# Patient Record
Sex: FEMALE | Race: WHITE | NOT HISPANIC OR LATINO | Employment: OTHER | URBAN - METROPOLITAN AREA
[De-identification: names, ages, dates, MRNs, and addresses within clinical notes are randomized per-mention and may not be internally consistent; named-entity substitution may affect disease eponyms.]

---

## 2018-03-19 ENCOUNTER — OFFICE VISIT CONVERTED (OUTPATIENT)
Dept: CARDIOLOGY | Facility: CLINIC | Age: 68
End: 2018-03-19
Attending: INTERNAL MEDICINE

## 2018-09-19 ENCOUNTER — OFFICE VISIT CONVERTED (OUTPATIENT)
Dept: CARDIOLOGY | Facility: CLINIC | Age: 68
End: 2018-09-19
Attending: INTERNAL MEDICINE

## 2018-10-18 ENCOUNTER — OFFICE VISIT CONVERTED (OUTPATIENT)
Dept: CARDIOLOGY | Facility: CLINIC | Age: 68
End: 2018-10-18
Attending: INTERNAL MEDICINE

## 2019-03-28 ENCOUNTER — CONVERSION ENCOUNTER (OUTPATIENT)
Dept: CARDIOLOGY | Facility: CLINIC | Age: 69
End: 2019-03-28

## 2019-03-28 ENCOUNTER — OFFICE VISIT CONVERTED (OUTPATIENT)
Dept: CARDIOLOGY | Facility: CLINIC | Age: 69
End: 2019-03-28
Attending: INTERNAL MEDICINE

## 2019-09-11 ENCOUNTER — CONVERSION ENCOUNTER (OUTPATIENT)
Dept: GASTROENTEROLOGY | Facility: CLINIC | Age: 69
End: 2019-09-11
Attending: INTERNAL MEDICINE

## 2019-10-02 ENCOUNTER — OFFICE VISIT CONVERTED (OUTPATIENT)
Dept: CARDIOLOGY | Facility: CLINIC | Age: 69
End: 2019-10-02
Attending: INTERNAL MEDICINE

## 2019-10-02 ENCOUNTER — HOSPITAL ENCOUNTER (OUTPATIENT)
Dept: OTHER | Facility: HOSPITAL | Age: 69
Discharge: HOME | End: 2019-10-02
Attending: INTERNAL MEDICINE

## 2019-10-02 LAB
ALBUMIN SERPL-MCNC: 4.8 G/DL (ref 3.5–5)
ALBUMIN/GLOB SERPL: 1.8 {RATIO} (ref 1.4–2.6)
ALP SERPL-CCNC: 108 U/L (ref 43–160)
ALT SERPL-CCNC: 10 U/L (ref 10–40)
ANION GAP SERPL CALC-SCNC: 18 MMOL/L (ref 8–19)
AST SERPL-CCNC: 15 U/L (ref 15–50)
BILIRUB SERPL-MCNC: 0.35 MG/DL (ref 0.2–1.3)
BUN SERPL-MCNC: 7 MG/DL (ref 5–25)
BUN/CREAT SERPL: 10 {RATIO} (ref 6–20)
CALCIUM SERPL-MCNC: 10 MG/DL (ref 8.7–10.4)
CHLORIDE SERPL-SCNC: 102 MMOL/L (ref 99–111)
CHOLEST SERPL-MCNC: 139 MG/DL (ref 107–200)
CHOLEST/HDLC SERPL: 2.3 {RATIO} (ref 3–6)
CONV CO2: 25 MMOL/L (ref 22–32)
CONV TOTAL PROTEIN: 7.5 G/DL (ref 6.3–8.2)
CREAT UR-MCNC: 0.71 MG/DL (ref 0.5–0.9)
GFR SERPLBLD BASED ON 1.73 SQ M-ARVRAT: >60 ML/MIN/{1.73_M2}
GLOBULIN UR ELPH-MCNC: 2.7 G/DL (ref 2–3.5)
GLUCOSE SERPL-MCNC: 103 MG/DL (ref 65–99)
HDLC SERPL-MCNC: 60 MG/DL (ref 40–60)
LDLC SERPL CALC-MCNC: 48 MG/DL (ref 70–100)
OSMOLALITY SERPL CALC.SUM OF ELEC: 290 MOSM/KG (ref 273–304)
POTASSIUM SERPL-SCNC: 4 MMOL/L (ref 3.5–5.3)
SODIUM SERPL-SCNC: 141 MMOL/L (ref 135–147)
TRIGL SERPL-MCNC: 153 MG/DL (ref 40–150)
VLDLC SERPL-MCNC: 31 MG/DL (ref 5–37)

## 2019-10-15 ENCOUNTER — HOSPITAL ENCOUNTER (OUTPATIENT)
Dept: GASTROENTEROLOGY | Facility: HOSPITAL | Age: 69
Setting detail: HOSPITAL OUTPATIENT SURGERY
Discharge: HOME | End: 2019-10-15
Attending: INTERNAL MEDICINE

## 2019-10-15 LAB — GLUCOSE BLD-MCNC: 147 MG/DL (ref 65–99)

## 2020-04-06 ENCOUNTER — TELEMEDICINE CONVERTED (OUTPATIENT)
Dept: CARDIOLOGY | Facility: CLINIC | Age: 70
End: 2020-04-06
Attending: INTERNAL MEDICINE

## 2020-11-11 ENCOUNTER — CONVERSION ENCOUNTER (OUTPATIENT)
Dept: CARDIOLOGY | Facility: CLINIC | Age: 70
End: 2020-11-11

## 2020-11-11 ENCOUNTER — OFFICE VISIT CONVERTED (OUTPATIENT)
Dept: CARDIOLOGY | Facility: CLINIC | Age: 70
End: 2020-11-11
Attending: NURSE PRACTITIONER

## 2020-12-30 ENCOUNTER — OFFICE VISIT CONVERTED (OUTPATIENT)
Dept: GASTROENTEROLOGY | Facility: CLINIC | Age: 70
End: 2020-12-30
Attending: NURSE PRACTITIONER

## 2021-05-10 NOTE — H&P
History and Physical      Patient Name: Thi Lynn   Patient ID: [id number]   Sex: Female   YOB: 1950    Primary Care Provider: Reyes Prince DO   Referring Provider: ISMA BRAN MD    Visit Date: December 30, 2020    Provider: ILENE Jackman   Location: [facility]   Location Address: [address] [place] [address]  [id number]   Location Phone: [phone]          Chief Complaint  · Gastroesophageal reflux      History Of Present Illness  The patient is a 70 year old /White female, who presents on referral from ISMA BRAN MD, for a gastroenterology evaluation of GERD. She describes episodes of dysphagia, heartburn, sour eructations, and regurgitation, which began years ago. The dysphagia has been present for months, is noted with solids and liquids, and has been stable. The patient's heartburn is exacerbated by eating any food, and when it occurs it lasts a variable amount of time. It is improved by PPI's. The patient is overweight. The patient has been on therapy for Acid Reflux. They have taken Protonix and H2RA's. It has been partially successful in relieving their symptoms.      The patient has had acid reflux for several years.  She has been well controlled with pantoprazole 40 mg twice daily, but her medication was decreased to daily.  Her reflux is aggravated with eating.  Vomiting helps alleviate her reflux.  She has occasional dysphagia that occurs with solids and liquids.  She has not lost any weight.  She denies nausea, vomiting, abdominal pain.  She has had constipation for many years.  She has been taking Metamucil, which does not help with her stools.  She can go anywhere from 3 to 7 days between bowel movements.  She has failed Metamucil, Linzess, and stool softeners in the past.  She denies rectal bleeding.  She has no family history of colon cancer.    Colonoscopy 10/2019 by Dr. Nieves revealed diverticuli in the sigmoid and  "grade 1 internal hemorrhoids.  Recommend repeat colonoscopy 2024 due to history of colon polpys.         Past Medical History  Arthritis; Chest pain; COPD; Heart Disease; Hyperlipemia; Hypertension; Limb Swelling; Seasonal allergies; Stomach Disorder         Past Surgical History  Cataract surgery; Cholecystectomy; Colonoscopy; EGD; heart surgery; Hysterectomy         Medication List  amitriptyline 50 mg oral tablet; carvedilol 3.125 mg oral tablet; clopidogrel 75 mg oral tablet; ezetimibe 10 mg oral tablet; irbesartan 150 mg oral tablet; metformin 500 mg oral tablet; nitroglycerin 0.2 mg/hr transdermal patch 24 hour; pantoprazole 40 mg oral tablet,delayed release (DR/EC); Repatha SureClick 140 mg/mL subcutaneous pen injector         Allergy List  morphine; Percocet; STATINS-HMG-COA REDUCTASE INHIBITORS; SULFA (SULFONAMIDES)       Allergies Reconciled  Family Medical History  Heart Disease; Cancer, Unspecified; No family history of colorectal cancer         Social History  Alcohol Use (Never); lives with spouse; Recreational Drug Use (Never); Retired.; Tobacco (Current every day)         Review of Systems  · Constitutional  o Denies  o : chills, fever  · Eyes  o Denies  o : blurred vision, changes in vision  · Cardiovascular  o Denies  o : chest pain  · Respiratory  o Denies  o : shortness of breath  · Gastrointestinal  o Denies  o : nausea, vomiting  · Genitourinary  o Denies  o : dysuria, blood in urine  · Integument  o Denies  o : rash  · Neurologic  o Denies  o : tingling or numbness  · Musculoskeletal  o Denies  o : joint pain  · Endocrine  o Denies  o : weight gain, weight loss  · Psychiatric  o Denies  o : anxiety, depression      Vitals  Date Time BP Position Site L\R Cuff Size HR RR TEMP (F) WT  HT  BMI kg/m2 BSA m2 O2 Sat FR L/min FiO2        12/30/2020 10:15 /53 Sitting    72 - R 16  144lbs 0oz 4'  11\" 29.08 1.65 99 %            Physical Examination  · Constitutional  o Appearance  o : " Well-nourished, well developed, alert, in no acute distress, alert and oriented X 3.  · Eyes  o Vision  o :   § Visual Fields  § : eyes move symmetrical in all directions  o Sclerae  o : sclerae anicteric  o Pupils and Irises  o : pupils equal and symetrical  · Neck  o Inspection/Palpation  o : Trachea is midline, no adenopathy  o Thyroid  o : Thyroid is not enlarged  · Respiratory  o Respiratory Effort  o : Breathing is unlabored.  o Inspection of Chest  o : normal appearance, no retractions  o Auscultation of Lungs  o : Chest is clear to auscultation bilaterally.  · Cardiovascular  o Heart  o :   § Auscultation of Heart  § : no murmurs, rubs, or gallops  · Gastrointestinal  o Abdominal Examination  o : Abdomen is soft, nontender to palpation, with normal active bowel sounds, no appreciable hepatosplenomegaly.  · Genitourinary  o Digital Rectal Examination  o : Deferred  · Skin and Subcutaneous Tissue  o General Inspection  o : Skin is without focal lesions. Skin turgor is normal.  · Psychiatric  o Mood and Affect  o : Mood and affect are appropriate to circumstances.          Assessment  · Colon Polyps     211.4  · Dysphagia     787.20/R13.10  · Esophageal Reflux     530.81/K21.9  · Constipation     564.00/K59.00      Plan  · Orders  o Consent for Esophagogastrodudodenoscopy (EGD) with dilatation -Possible risk/complications, benefits, and alternatives to surgical or invasive procedure have been explained to patient and/or legal gaurdian. -Patient has been evaluated and can tolerate anesthesia and/or sedation. Risk, benefits, and alternatives to anesthesia and sedation have been explained to patient and/or legal gaurdian. (38191) - 530.81/K21.9, 787.20/R13.10 - 12/30/2020  · Medications  o famotidine 40 mg oral tablet   SIG: take 1 tablet (40 mg) by oral route once daily at bedtime for 30 days   DISP: (30) Tablet with 3 refills  Prescribed on 12/30/2020     o Trulance 3 mg oral tablet   SIG: take 1 tablet (3 mg)  by oral route once daily for 30 days   DISP: (30) Tablet with 3 refills  Prescribed on 12/30/2020     o Medications have been Reconciled  o Transition of Care or Provider Policy  · Instructions  o 2-year-old female presenting today with worsening reflux. Her reflux is not being well controlled with pantoprazole 40 mg daily. I have sent in a prescription of famotidine 40 mg nightly to see if that will help improve her symptoms. Her last colonoscopy was in October 2019. I have sent in a prescription of Trulance 3 mg to see if that will help improve her constipation. I have recommended that she undergo an EGD for her symptoms. The patient has been notified that Covid testing is a requirement. The patient would like to proceed with the procedure. We will get clearance from Dr. Gambino for her to withhold her Plavix 7 days prior to the procedure.            Electronically Signed by: ILENE Jackman -Author on December 30, 2020 11:22:08 AM  Electronically Co-signed by: Eduard Nieves MD -Reviewer on January 10, 2021 09:00:38 PM

## 2021-05-12 NOTE — PROGRESS NOTES
Quick Note      Patient Name: Thi Lynn   Patient ID: [id number]   Sex: Female   YOB: 1950    Primary Care Provider: Reyes Prince DO   Referring Provider: Gilma Gambino MD    Visit Date: April 6, 2020    Provider: Gilma Gambino MD   Location: [facility]   Location Address: [address] [place] [address], [facility]   BEBETO Mauro  [id number]   Location Phone: [phone]          History Of Present Illness  TELEHEALTH VISIT  Chief Complaint: Follow-up of coronary artery disease, hypertension, hyperlipidemia. Telehealth due to COVID-19.   Thi Lynn is a 69-year-old female with coronary artery disease, hypertension, hyperlipidemia, who is presenting for evaluation via telehealth visit. Verbal consent obtained before beginning visit. She is doing very well. She denies any chest pain, shortness of breath, PND, orthopnea, palpitations, dizziness or syncope.   Provider spent 5 minutes with the patient during telehealth visit.   The following staff were present during this visit: Provider only.   Past Medical History/Overview of Patient Symptoms     PAST MEDICAL HISTORY:  Positive for coronary artery disease with small-vessel disease and distal disease.     CURRENT MEDICATIONS:  Metformin 500 mg once a day; Clopidogrel 75 mg daily; Amitriptyline 25 mg daily; Tizanidine 2 mg 3 times a day; Amlodipine 5 mg daily; Vitamin B12 1000 mcg daily; Carvedilol 3.125 mg b.i.d.; Acarbose 25 mg 2 tablets 3 times a day; NTG p.r.n; Repatha 140 mg every 2 weeks; NTG patch 0.2 mg per hour qd, DC qhs.  The dosage and frequency of the medications were reviewed with the patient.    FAMILY HISTORY:  Positive for diabetes and heart disease.  Negative for hypertension.    PSYCHO/SOCIAL HISTORY:  No history of mood changes or depression.  She rarely drinks alcohol.  She currently uses tobacco.    REVIEW OF SYSTEMS:  Negative for chest pain, palpitations, shortness of breath, swelling, chronic or frequent  coughs, asthma or wheezing..     Chemistry profile is normal.  CBC is normal.  TSH is normal.  LDL 63, HDL 56, TRG 80.       Vitals     Per patient, at-home vitals are blood pressure 132/61, heart rate of 92.           Assessment     1.  Coronary artery disease, without angina pectoris.  2.  Hypertension controlled.  3.  Hyperlipidemia at goal.       Plan     1.  Continue medications.  2.  Follow up in 6 months.    Gilma Gambino M.D., [facility]  pmm/dmd           This note was transcribed by Maame Vazquez.  dmd/pmm  The above service was transcribed by Maame Vazquez, and I attest to the accuracy of the note.  PMM               Electronically Signed by: Maame Vazquez-, -Author on April 9, 2020 04:24:33 AM  Electronically Co-signed by: Gilma Gambino MD -Reviewer on April 9, 2020 09:55:11 AM

## 2021-05-13 NOTE — PROGRESS NOTES
Progress Note      Patient Name: Thi Lynn   Patient ID: [id number]   Sex: Female   YOB: 1950    Primary Care Provider: Reyes Prince DO   Referring Provider: ISMA BRAN MD    Visit Date: November 11, 2020    Provider: ILENE Rosario   Location: [facility]   Location Address: [address] [place] [address], [facility]   [place]  [id number]   Location Phone: [phone]          Chief Complaint     Pedal edema.       History Of Present Illness  REFERRING PROVIDER: ISMA BRAN MD   Thi Lynn is a 70 year old /White female who complains of edema, particularly on the right side which is where they harvested her vein grafts. It is mild and long-term for her. She denies any chest pain or pressure. No palpitations, shortness of breath, dizziness, syncope, PND, or orthopnea. Her blood pressure was elevated, so her amlodipine was increased to 10 mg by PCP a few weeks ago. However, at one time, she used to be on irbesartan, but it just kind of disappeared off of her list. She also admits she has not been taking Repatha because of issues with getting the prescription filled. She continues to smoke a fourth of a pack a day, but she is contemplating stopping.   PAST MEDICAL HISTORY: Coronary artery disease with previous bypass in 2008 and previous MI; Hyperlipidemia; Hypertension; Nicotine dependence; Previous CVA; History of arrhythmia, converted to sinus through ablation therapy; Peripheral vascular disease.   PSYCHOSOCIAL HISTORY: Current smoker of one-fourth a pack per day. Rarely consumes alcohol.   CURRENT MEDICATIONS: Based on a list, she is on metformin 500 mg daily; clopidogrel 75 mg daily; amitriptyline 25 mg daily; tizanidine 2 mg daily; amlodipine 10 mg daily; vitamin B12 1,000 units daily; carvedilol 3.125 mg b.i.d.; acarbose 25 mg 2 tabs t.i.d.; nitroglycerin p.r.n.; Repatha 140 mg/mL every two weeks but she has not started it back up yet; pantoprazole.       Review  "of Systems  · Cardiovascular  o Admits  o : swelling (feet, ankles, hands)  o Denies  o : palpitations (fast, fluttering, or skipping beats), shortness of breath while walking or lying flat, chest pain or angina pectoris   · Respiratory  o Admits  o : chronic or frequent cough      Vitals  Date Time BP Position Site L\R Cuff Size HR RR TEMP (F) WT  HT  BMI kg/m2 BSA m2 O2 Sat FR L/min FiO2 HC       11/11/2020 02:05 /48 Sitting    76 - R   142lbs 0oz 4'  11\" 28.68 1.64       11/11/2020 02:05 /56 Sitting                       Physical Examination  · Constitutional  o Appearance  o : Awake, alert, in no acute distress.  · Eyes  o Conjunctivae  o : Conjunctivae normal.  · Ears, Nose, Mouth and Throat  o Oral Cavity  o :   § Oral Mucosa  § : Normal.  · Neck  o Jugular Veins  o : No JVD. Good carotid upstroke. No bruits noted.  · Respiratory  o Respiratory  o : Increased AP diameter with diminished breath sounds. Prolonged expiration. No rales or rhonchi.   · Cardiovascular  o Heart  o : PMI displaced. Heart sounds distant. S1, S2 regular. No S3. Positive S4. 1/6 systolic murmur at the base. 1-2/6 systolic murmur at the apex. Negative diastolic murmur.   o Peripheral Vascular System  o :   § Extremities  § : Good femoral pulses. Adequate pedal pulses. Trace pedal edema, right greater than left.   · Gastrointestinal  o Abdominal Examination  o : Soft. No tenderness or masses felt. No hepatosplenomegaly. Abdominal aorta is not palpable.          Assessment     1.  Hypertension, needs tighter control.  2.  Coronary artery disease with previous bypass without angina.  3.  Previous atrial arrhythmia, currently in sinus.  4.  Hyperlipidemia, unknown control.  5.  Diabetes mellitus.  6.  Previous CVA.  7.  Peripheral vascular disease, stable.       Plan     1.  Encouraged her not to run out of her medications.  2.  Repeat a Lipid and CMP in 3 months to make sure Repatha is working as expected.  3.  Restart irbesartan " at 150 mg for tighter control of her blood pressure.  4.  She will do a blood pressure log, and we will adjust medications accordingly.  5.  Follow up in 8-9 months with labs or earlier if needed.        ILENE Rich  JF:vm             Electronically Signed by: Shelli Hernandez-, Other -Author on November 16, 2020 12:06:42 PM  Electronically Co-signed by: ILENE Rosario -Reviewer on November 19, 2020 08:21:06 AM

## 2021-05-14 VITALS
HEART RATE: 72 BPM | OXYGEN SATURATION: 99 % | HEIGHT: 59 IN | DIASTOLIC BLOOD PRESSURE: 53 MMHG | WEIGHT: 144 LBS | BODY MASS INDEX: 29.03 KG/M2 | SYSTOLIC BLOOD PRESSURE: 141 MMHG | RESPIRATION RATE: 16 BRPM

## 2021-05-14 VITALS
DIASTOLIC BLOOD PRESSURE: 48 MMHG | WEIGHT: 142 LBS | HEIGHT: 59 IN | BODY MASS INDEX: 28.63 KG/M2 | HEART RATE: 76 BPM | SYSTOLIC BLOOD PRESSURE: 150 MMHG

## 2021-05-15 VITALS
WEIGHT: 130 LBS | HEART RATE: 72 BPM | DIASTOLIC BLOOD PRESSURE: 66 MMHG | HEIGHT: 59 IN | BODY MASS INDEX: 26.21 KG/M2 | SYSTOLIC BLOOD PRESSURE: 166 MMHG

## 2021-05-15 VITALS
SYSTOLIC BLOOD PRESSURE: 156 MMHG | BODY MASS INDEX: 27.01 KG/M2 | HEART RATE: 62 BPM | DIASTOLIC BLOOD PRESSURE: 70 MMHG | WEIGHT: 134 LBS | HEIGHT: 59 IN

## 2021-05-16 VITALS
BODY MASS INDEX: 27.21 KG/M2 | SYSTOLIC BLOOD PRESSURE: 144 MMHG | HEIGHT: 59 IN | DIASTOLIC BLOOD PRESSURE: 62 MMHG | HEART RATE: 62 BPM | WEIGHT: 135 LBS

## 2021-05-16 VITALS
HEIGHT: 59 IN | SYSTOLIC BLOOD PRESSURE: 120 MMHG | WEIGHT: 141 LBS | HEART RATE: 90 BPM | DIASTOLIC BLOOD PRESSURE: 68 MMHG | BODY MASS INDEX: 28.43 KG/M2

## 2021-05-16 VITALS
SYSTOLIC BLOOD PRESSURE: 140 MMHG | DIASTOLIC BLOOD PRESSURE: 68 MMHG | WEIGHT: 135 LBS | HEART RATE: 62 BPM | BODY MASS INDEX: 27.21 KG/M2 | HEIGHT: 59 IN

## 2021-08-11 ENCOUNTER — OFFICE VISIT (OUTPATIENT)
Dept: CARDIOLOGY | Facility: CLINIC | Age: 71
End: 2021-08-11

## 2021-08-11 VITALS
HEART RATE: 72 BPM | DIASTOLIC BLOOD PRESSURE: 46 MMHG | BODY MASS INDEX: 25.4 KG/M2 | HEIGHT: 59 IN | WEIGHT: 126 LBS | SYSTOLIC BLOOD PRESSURE: 138 MMHG

## 2021-08-11 DIAGNOSIS — I20.8 OTHER FORMS OF ANGINA PECTORIS (HCC): Primary | ICD-10-CM

## 2021-08-11 DIAGNOSIS — E78.5 HYPERLIPIDEMIA LDL GOAL <70: ICD-10-CM

## 2021-08-11 DIAGNOSIS — F17.210 CIGARETTE NICOTINE DEPENDENCE WITHOUT COMPLICATION: ICD-10-CM

## 2021-08-11 DIAGNOSIS — Z95.1 HX OF CABG: ICD-10-CM

## 2021-08-11 DIAGNOSIS — I10 ESSENTIAL HYPERTENSION: ICD-10-CM

## 2021-08-11 DIAGNOSIS — I25.118 CORONARY ARTERY DISEASE INVOLVING NATIVE CORONARY ARTERY OF NATIVE HEART WITH OTHER FORM OF ANGINA PECTORIS (HCC): ICD-10-CM

## 2021-08-11 PROBLEM — I20.89 OTHER FORMS OF ANGINA PECTORIS: Status: ACTIVE | Noted: 2021-08-11

## 2021-08-11 PROBLEM — I25.10 CORONARY ARTERY DISEASE: Status: ACTIVE | Noted: 2021-08-11

## 2021-08-11 PROCEDURE — 99406 BEHAV CHNG SMOKING 3-10 MIN: CPT | Performed by: INTERNAL MEDICINE

## 2021-08-11 PROCEDURE — 99214 OFFICE O/P EST MOD 30 MIN: CPT | Performed by: INTERNAL MEDICINE

## 2021-08-11 PROCEDURE — 93000 ELECTROCARDIOGRAM COMPLETE: CPT | Performed by: INTERNAL MEDICINE

## 2021-08-11 RX ORDER — EZETIMIBE 10 MG/1
10 TABLET ORAL DAILY
COMMUNITY
End: 2021-08-11 | Stop reason: SDUPTHER

## 2021-08-11 RX ORDER — CARVEDILOL 3.12 MG/1
3.12 TABLET ORAL 2 TIMES DAILY WITH MEALS
COMMUNITY
End: 2021-08-11 | Stop reason: SDUPTHER

## 2021-08-11 RX ORDER — NITROGLYCERIN 40 MG/1
1 PATCH TRANSDERMAL DAILY
COMMUNITY
End: 2021-08-11 | Stop reason: SDUPTHER

## 2021-08-11 RX ORDER — NITROGLYCERIN 40 MG/1
1 PATCH TRANSDERMAL DAILY
Qty: 30 PATCH | Refills: 11 | Status: SHIPPED | OUTPATIENT
Start: 2021-08-11

## 2021-08-11 RX ORDER — CLOPIDOGREL BISULFATE 75 MG/1
75 TABLET ORAL DAILY
Qty: 90 TABLET | Refills: 3 | Status: SHIPPED | OUTPATIENT
Start: 2021-08-11 | End: 2022-08-26 | Stop reason: SDUPTHER

## 2021-08-11 RX ORDER — CLOPIDOGREL BISULFATE 75 MG/1
75 TABLET ORAL DAILY
COMMUNITY
End: 2021-08-11 | Stop reason: SDUPTHER

## 2021-08-11 RX ORDER — NITROGLYCERIN 0.4 MG/1
TABLET SUBLINGUAL
Qty: 100 TABLET | Refills: 11 | Status: SHIPPED | OUTPATIENT
Start: 2021-08-11

## 2021-08-11 RX ORDER — METFORMIN HYDROCHLORIDE 500 MG/1
500 TABLET, EXTENDED RELEASE ORAL
COMMUNITY
Start: 2021-06-01

## 2021-08-11 RX ORDER — AMLODIPINE BESYLATE 10 MG/1
10 TABLET ORAL NIGHTLY
COMMUNITY
Start: 2021-07-27

## 2021-08-11 RX ORDER — PREGABALIN 25 MG/1
CAPSULE ORAL
COMMUNITY
Start: 2021-06-29 | End: 2021-08-30

## 2021-08-11 RX ORDER — EZETIMIBE 10 MG/1
10 TABLET ORAL DAILY
Qty: 90 TABLET | Refills: 3 | Status: SHIPPED | OUTPATIENT
Start: 2021-08-11

## 2021-08-11 RX ORDER — CARVEDILOL 3.12 MG/1
3.12 TABLET ORAL 2 TIMES DAILY WITH MEALS
Qty: 180 TABLET | Refills: 3 | Status: SHIPPED | OUTPATIENT
Start: 2021-08-11 | End: 2021-09-01

## 2021-08-11 NOTE — ASSESSMENT & PLAN NOTE
Tobacco use is ongoing.  However she is agreeable to stopping smoking and plans on stopping smoking the next 4 to 6 weeks.  I have told her to set up in mental target to stop smoking in October 1.  She does not think that she needs any aids in stopping.  She is already down to less than half a pack per day.  Smoking cessation counseling was performed and 4 to 5 minutes was spent discussing merits of smoking cessation and continued health

## 2021-08-11 NOTE — ASSESSMENT & PLAN NOTE
Lipid abnormalities are noted goal.  Her lipids were doing great on Repatha and her insurance stopped paying for it.  She did not tell us and has been off Repatha for several months.  I have given her a new prescription and hopefully her insurance will pay for it otherwise we will try to get it precertified.  She has had problems with myalgias with multiple statins in the past

## 2021-08-11 NOTE — ASSESSMENT & PLAN NOTE
Patient developed moderately severe chest discomfort couple weeks ago after working in the yard.  She was diaphoretic and short of breath and her  gave her 3 sublingual nitroglycerin before it resolved.  I have explained to her that if she has any angina episode that required 3 sublingual nitroglycerin she needs to call EMS and come to the hospital emergency room.  I am going to do a nuclear stress test to evaluate her underlying ischemic heart disease

## 2021-08-11 NOTE — ASSESSMENT & PLAN NOTE
Patient has a 2 weeks ago three-vessel bypass in 2008 patient.  She is not sure whether she had 2 vessel or three-vessel bypass

## 2021-08-11 NOTE — PROGRESS NOTES
Chief Complaint  Follow-up    Subjective            Thi Lynn presents to [facility]  Patient developed moderately severe chest discomfort couple weeks ago after working in the yard.  She was diaphoretic and short of breath and her  gave her 3 sublingual nitroglycerin before it resolved.  I have explained to her that if she has any angina episode that required 3 sublingual nitroglycerin she needs to call EMS and come to the hospital emergency room.  She has been taking her Coreg only once a day and I have asked her to take it twice a day as instructed      Past Medical History:   Diagnosis Date   • Arthritis    • Chest pain    • COPD (chronic obstructive pulmonary disease) (CMS/HCC)    • Heart disease    • Hyperlipemia    • Hypertension    • Limb swelling    • Myocardial infarction (CMS/[facility])    • Seasonal allergies    • Stomach disorder        Allergies   Allergen Reactions   • Morphine Unknown - High Severity   • Oxycodone-Acetaminophen Unknown - High Severity   • Sulfa Antibiotics Unknown - High Severity        Past Surgical History:   Procedure Laterality Date   • ARTERIAL BYPASS SURGERY     • CARDIAC SURGERY     • CATARACT EXTRACTION      Cataract Surgery   • CHOLECYSTECTOMY     • COLONOSCOPY  2019   • ENDOSCOPY      + 10 years Franco Merrill in [place]   • HYSTERECTOMY          Social History     Tobacco Use   • Smoking status: Current Every Day Smoker     Packs/day: 0.50   • Smokeless tobacco: Never Used   Vaping Use   • Vaping Use: Never used   Substance Use Topics   • Alcohol use: Never   • Drug use: Never       Family History   Problem Relation Age of Onset   • Heart disease Father    • Cancer Father         Unspecified   • Heart disease Brother         Prior to Admission medications    Medication Sig Start Date End Date Taking? Authorizing Provider   amLODIPine (NORVASC) 10 MG tablet  7/27/21  Yes Provider, MD Robson   carvedilol (COREG) 3.125 MG tablet Take  "3.125 mg by mouth Daily.   Yes Provider, MD Robson   clopidogrel (PLAVIX) 75 MG tablet Take 75 mg by mouth Daily.   Yes Provider, MD Robson   ezetimibe (ZETIA) 10 MG tablet Take 10 mg by mouth Daily.   Yes Provider, MD Robson   metFORMIN ER (GLUCOPHAGE-XR) 500 MG 24 hr tablet  6/1/21  Yes Provider, MD Robson   pregabalin (LYRICA) 25 MG capsule  6/29/21  Yes Provider, MD Robson        Review of Systems   Constitutional: Negative for fatigue.   Respiratory: Positive for cough and chest tightness. Negative for shortness of breath.    Cardiovascular: Positive for leg swelling. Negative for chest pain and palpitations.   Neurological: Negative for dizziness.        Objective     /46 (BP Location: Left arm, Patient Position: Sitting, Cuff Size: Adult)   Pulse 72   Ht 149.9 cm (59\")   Wt 57.2 kg (126 lb)   BMI 25.45 kg/m²       Physical Exam  Constitutional:       General: She is awake.      Appearance: Normal appearance.   Neck:      Thyroid: No thyromegaly.      Vascular: No carotid bruit or JVD.   Cardiovascular:      Rate and Rhythm: Normal rate and regular rhythm.      Chest Wall: PMI is not displaced.      Pulses: Normal pulses.      Heart sounds: Normal heart sounds, S1 normal and S2 normal. No murmur heard.   No friction rub. No gallop. No S3 or S4 sounds.    Pulmonary:      Effort: Pulmonary effort is normal.      Breath sounds: Normal breath sounds and air entry. No wheezing, rhonchi or rales.   Abdominal:      General: Bowel sounds are normal.      Palpations: Abdomen is soft. There is no mass.      Tenderness: There is no abdominal tenderness.   Musculoskeletal:      Cervical back: Neck supple.      Right lower leg: No edema.      Left lower leg: No edema.   Neurological:      Mental Status: She is alert and oriented to person, place, and time.   Psychiatric:         Mood and Affect: Mood normal.         Behavior: Behavior is cooperative.       No results found for: PROBNP, " BNP          No results found for: TSH   No results found for: FREET4   No results found for: DDIMERQUANT  Magnesium   Date Value Ref Range Status   07/31/2019 2.33 (H) 1.60 - 2.30 mg/dL Final      No results found for: DIGOXIN              Result Review :                    ECG 12 Lead    Date/Time: 8/11/2021 4:54 PM  Performed by: Gilma Gambino MD  Authorized by: Gilma Gambino MD   Comparison: compared with previous ECG   Comments: Sinus rhythm left atrial abnormality left axis deviation abnormal R wave progression early transition with mild ST depression anterolateral leads.  This EKG is slightly different than the previous EKG. with ST depression in anterolateral leads appears to be new                Assessment and Plan        Diagnoses and all orders for this visit:    1. Other forms of angina pectoris (CMS/HCC) (Primary)  Assessment & Plan:  Patient developed moderately severe chest discomfort couple weeks ago after working in the yard.  She was diaphoretic and short of breath and her  gave her 3 sublingual nitroglycerin before it resolved.  I have explained to her that if she has any angina episode that required 3 sublingual nitroglycerin she needs to call EMS and come to the hospital emergency room.  I am going to do a nuclear stress test to evaluate her underlying ischemic heart disease    Orders:  -     ECG 12 Lead    2. Coronary artery disease involving native coronary artery of native heart with other form of angina pectoris (CMS/HCC)  -     Evolocumab (REPATHA) injection 140 mg  -     CBC Auto Differential; Future  -     Comprehensive Metabolic Panel; Future  -     Thyroid Panel With TSH; Future  -     Lipid Panel; Future  -     Stress Test With Myocardial Perfusion One Day; Future  -     ECG 12 Lead    3. Essential hypertension  -     Evolocumab (REPATHA) injection 140 mg  -     CBC Auto Differential; Future  -     Comprehensive Metabolic Panel; Future  -     Thyroid Panel With TSH;  Future  -     Lipid Panel; Future  -     Stress Test With Myocardial Perfusion One Day; Future  -     ECG 12 Lead    4. Hyperlipidemia LDL goal <70  Assessment & Plan:  Lipid abnormalities are noted goal.  Her lipids were doing great on Repatha and her insurance stopped paying for it.  She did not tell us and has been off Repatha for several months.  I have given her a new prescription and hopefully her insurance will pay for it otherwise we will try to get it precertified.  She has had problems with myalgias with multiple statins in the past    Orders:  -     Evolocumab (REPATHA) injection 140 mg  -     CBC Auto Differential; Future  -     Comprehensive Metabolic Panel; Future  -     Thyroid Panel With TSH; Future  -     Lipid Panel; Future  -     Stress Test With Myocardial Perfusion One Day; Future  -     ECG 12 Lead    5. Cigarette nicotine dependence without complication  Assessment & Plan:  Tobacco use is ongoing.  However she is agreeable to stopping smoking and plans on stopping smoking the next 4 to 6 weeks.  I have told her to set up in mental target to stop smoking in October 1.  She does not think that she needs any aids in stopping.  She is already down to less than half a pack per day.  Smoking cessation counseling was performed and 4 to 5 minutes was spent discussing merits of smoking cessation and continued health    Orders:  -     Evolocumab (REPATHA) injection 140 mg  -     CBC Auto Differential; Future  -     Comprehensive Metabolic Panel; Future  -     Thyroid Panel With TSH; Future  -     Lipid Panel; Future  -     Stress Test With Myocardial Perfusion One Day; Future  -     ECG 12 Lead    6. Hx of CABG  Assessment & Plan:  Patient has a 2 weeks ago three-vessel bypass in 2008 patient.  She is not sure whether she had 2 vessel or three-vessel bypass      Other orders  -     carvedilol (COREG) 3.125 MG tablet; Take 1 tablet by mouth 2 (Two) Times a Day With Meals.  Dispense: 180 tablet; Refill:  3  -     clopidogrel (PLAVIX) 75 MG tablet; Take 1 tablet by mouth Daily.  Dispense: 90 tablet; Refill: 3  -     ezetimibe (ZETIA) 10 MG tablet; Take 1 tablet by mouth Daily.  Dispense: 90 tablet; Refill: 3  -     nitroglycerin (NITRODUR) 0.2 MG/HR patch; Place 1 patch on the skin as directed by provider Daily.  Dispense: 30 patch; Refill: 11  -     nitroglycerin (NITROSTAT) 0.4 MG SL tablet; 1 under the tongue as needed for angina, may repeat q5mins for up three doses  Dispense: 100 tablet; Refill: 11          Follow Up     Return for nuclear stress asap.    Patient was given instructions and counseling regarding her condition or for health maintenance advice. Please see specific information pulled into the AVS if appropriate.

## 2021-08-30 ENCOUNTER — APPOINTMENT (OUTPATIENT)
Dept: NUCLEAR MEDICINE | Facility: HOSPITAL | Age: 71
End: 2021-08-30

## 2021-08-30 ENCOUNTER — OFFICE VISIT (OUTPATIENT)
Dept: GASTROENTEROLOGY | Facility: CLINIC | Age: 71
End: 2021-08-30

## 2021-08-30 VITALS
WEIGHT: 125.6 LBS | OXYGEN SATURATION: 100 % | SYSTOLIC BLOOD PRESSURE: 143 MMHG | BODY MASS INDEX: 25.32 KG/M2 | DIASTOLIC BLOOD PRESSURE: 42 MMHG | HEIGHT: 59 IN | HEART RATE: 63 BPM

## 2021-08-30 DIAGNOSIS — R19.4 ALTERED BOWEL HABITS: ICD-10-CM

## 2021-08-30 DIAGNOSIS — K21.9 GASTROESOPHAGEAL REFLUX DISEASE, UNSPECIFIED WHETHER ESOPHAGITIS PRESENT: ICD-10-CM

## 2021-08-30 DIAGNOSIS — Z87.19 HISTORY OF DIVERTICULITIS: Primary | ICD-10-CM

## 2021-08-30 DIAGNOSIS — R19.7 DIARRHEA, UNSPECIFIED TYPE: ICD-10-CM

## 2021-08-30 PROBLEM — I48.91 ATRIAL FIBRILLATION: Status: ACTIVE | Noted: 2021-08-30

## 2021-08-30 PROBLEM — M19.90 ARTHRITIS: Status: ACTIVE | Noted: 2021-08-30

## 2021-08-30 PROCEDURE — 99214 OFFICE O/P EST MOD 30 MIN: CPT | Performed by: NURSE PRACTITIONER

## 2021-08-30 RX ORDER — DICYCLOMINE HCL 20 MG
20 TABLET ORAL EVERY 6 HOURS
Qty: 90 TABLET | Refills: 1 | Status: SHIPPED | OUTPATIENT
Start: 2021-08-30 | End: 2021-10-22

## 2021-08-30 RX ORDER — AMOXICILLIN AND CLAVULANATE POTASSIUM 875; 125 MG/1; MG/1
1 TABLET, FILM COATED ORAL 2 TIMES DAILY
Qty: 20 TABLET | Refills: 0 | Status: SHIPPED | OUTPATIENT
Start: 2021-08-30 | End: 2021-09-09

## 2021-08-30 RX ORDER — IRBESARTAN 300 MG/1
1 TABLET ORAL EVERY 24 HOURS
COMMUNITY
End: 2022-08-12

## 2021-08-30 RX ORDER — HYDROCODONE BITARTRATE AND ACETAMINOPHEN 10; 325 MG/1; MG/1
1 TABLET ORAL EVERY 6 HOURS PRN
COMMUNITY
End: 2022-08-12

## 2021-08-30 RX ORDER — PANTOPRAZOLE SODIUM 40 MG/1
1 GRANULE, DELAYED RELEASE ORAL EVERY 24 HOURS
COMMUNITY

## 2021-08-30 NOTE — PATIENT INSTRUCTIONS
Diverticulosis    Diverticulosis is a condition that develops when small pouches (diverticula) form in the wall of the large intestine (colon). The colon is where water is absorbed and stool (feces) is formed. The pouches form when the inside layer of the colon pushes through weak spots in the outer layers of the colon. You may have a few pouches or many of them.  The pouches usually do not cause problems unless they become inflamed or infected. When this happens, the condition is called diverticulitis.  What are the causes?  The cause of this condition is not known.  What increases the risk?  The following factors may make you more likely to develop this condition:  · Being older than age 60. Your risk for this condition increases with age. Diverticulosis is rare among people younger than age 30. By age 80, many people have it.  · Eating a low-fiber diet.  · Having frequent constipation.  · Being overweight.  · Not getting enough exercise.  · Smoking.  · Taking over-the-counter pain medicines, like aspirin and ibuprofen.  · Having a family history of diverticulosis.  What are the signs or symptoms?  In most people, there are no symptoms of this condition. If you do have symptoms, they may include:  · Bloating.  · Cramps in the abdomen.  · Constipation or diarrhea.  · Pain in the lower left side of the abdomen.  How is this diagnosed?  Because diverticulosis usually has no symptoms, it is most often diagnosed during an exam for other colon problems. The condition may be diagnosed by:  · Using a flexible scope to examine the colon (colonoscopy).  · Taking an X-ray of the colon after dye has been put into the colon (barium enema).  · Having a CT scan.  How is this treated?  You may not need treatment for this condition. Your health care provider may recommend treatment to prevent problems. You may need treatment if you have symptoms or if you previously had diverticulitis. Treatment may include:  · Eating a high-fiber  diet.  · Taking a fiber supplement.  · Taking a live bacteria supplement (probiotic).  · Taking medicine to relax your colon.  Follow these instructions at home:  Medicines  · Take over-the-counter and prescription medicines only as told by your health care provider.  · If told by your health care provider, take a fiber supplement or probiotic.  Constipation prevention  Your condition may cause constipation. To prevent or treat constipation, you may need to:  · Drink enough fluid to keep your urine pale yellow.  · Take over-the-counter or prescription medicines.  · Eat foods that are high in fiber, such as beans, whole grains, and fresh fruits and vegetables.  · Limit foods that are high in fat and processed sugars, such as fried or sweet foods.    General instructions  · Try not to strain when you have a bowel movement.  · Keep all follow-up visits as told by your health care provider. This is important.  Contact a health care provider if you:  · Have pain in your abdomen.  · Have bloating.  · Have cramps.  · Have not had a bowel movement in 3 days.  Get help right away if:  · Your pain gets worse.  · Your bloating becomes very bad.  · You have a fever or chills, and your symptoms suddenly get worse.  · You vomit.  · You have bowel movements that are bloody or black.  · You have bleeding from your rectum.  Summary  · Diverticulosis is a condition that develops when small pouches (diverticula) form in the wall of the large intestine (colon).  · You may have a few pouches or many of them.  · This condition is most often diagnosed during an exam for other colon problems.  · Treatment may include increasing the fiber in your diet, taking supplements, or taking medicines.  This information is not intended to replace advice given to you by your health care provider. Make sure you discuss any questions you have with your health care provider.  Document Revised: 07/16/2020 Document Reviewed: 07/16/2020  Elsevier Patient  Education © 2021 [employer]

## 2021-08-30 NOTE — PROGRESS NOTES
Chief Complaint  Diarrhea    Thi Lynn is a 71 y.o. female who presents to [facility] as a new patient.    HPI  71-year-old female presenting the office today as a new patient with a history of diarrhea, GERD and diverticulitis. Patient reports she got her covid vaccination and started having diarrhea for about 2 months following the vaccination.  She was then seen by her primary care provider who ordered a MRI of the abdomen.  She reports being diagnosed with diverticulitis with MRI of abdomen at [place]. Patient was started on a probiotic and a short course of antibiotics. She reports improvement in her symptoms. She continues to have lower abdominal cramps. Bowel movements are continue to be loose 7-10 times a day. Denies blood in the stool.  Patient denies fever, nausea, vomiting, weight loss, night sweats, melena, hematochezia, hematemesis.  Patient has been on protonix 40 mg day, she has been on this medication for several years. No other new medications.     Colonoscopy: Review of the patient's most recent colonoscopy performed by Dr. Nieves on 10/15/2019 for personal history of colon polyps.  Colonoscopy revealed diverticula, grade 1 internal hemorrhoids otherwise normal with a recommended repeat in 5 years.    EGD: EGD performed by Dr. Merrill over 10 years ago.    Patient has pending labs.    Result Review :   The following data was reviewed by: Rola Jay NP on 08/30/2021 for             Past Medical History:   Diagnosis Date   • Arthritis    • Chest pain    • COPD (chronic obstructive pulmonary disease) (CMS/HCC)    • Heart disease    • Hyperlipemia    • Hypertension    • Limb swelling    • Myocardial infarction (CMS/HCC)    • Seasonal allergies    • Stomach disorder        Past Surgical History:   Procedure Laterality Date   • ARTERIAL BYPASS SURGERY     • CARDIAC SURGERY     • CATARACT EXTRACTION      Cataract Surgery   • CHOLECYSTECTOMY     • COLONOSCOPY   "2019   • ENDOSCOPY      + 10 years Franco Merrill in [place]   • HYSTERECTOMY           Current Outpatient Medications:   •  amLODIPine (NORVASC) 10 MG tablet, , Disp: , Rfl:   •  carvedilol (COREG) 3.125 MG tablet, Take 1 tablet by mouth 2 (Two) Times a Day With Meals., Disp: 180 tablet, Rfl: 3  •  clopidogrel (PLAVIX) 75 MG tablet, Take 1 tablet by mouth Daily., Disp: 90 tablet, Rfl: 3  •  ezetimibe (ZETIA) 10 MG tablet, Take 1 tablet by mouth Daily., Disp: 90 tablet, Rfl: 3  •  HYDROcodone-acetaminophen (NORCO)  MG per tablet, Take 1 tablet by mouth Every 6 (Six) Hours As Needed., Disp: , Rfl:   •  irbesartan (AVAPRO) 300 MG tablet, Take 1 tablet by mouth Daily., Disp: , Rfl:   •  metFORMIN ER (GLUCOPHAGE-XR) 500 MG 24 hr tablet, , Disp: , Rfl:   •  nitroglycerin (NITRODUR) 0.2 MG/HR patch, Place 1 patch on the skin as directed by provider Daily., Disp: 30 patch, Rfl: 11  •  nitroglycerin (NITROSTAT) 0.4 MG SL tablet, 1 under the tongue as needed for angina, may repeat q5mins for up three doses, Disp: 100 tablet, Rfl: 11  •  pantoprazole (PROTONIX) 40 MG pack packet, Take 1 tablet by mouth Daily., Disp: , Rfl:   •  dicyclomine (BENTYL) 20 MG tablet, Take 1 tablet by mouth Every 6 (Six) Hours., Disp: 90 tablet, Rfl: 1    Current Facility-Administered Medications:   •  Evolocumab (REPATHA) injection 140 mg, 140 mg, Subcutaneous, Q14 Days, Gilma Gambino MD     Allergies   Allergen Reactions   • Morphine Unknown - High Severity   • Oxycodone-Acetaminophen Unknown - High Severity   • Sulfa Antibiotics Unknown - High Severity       Family History   Problem Relation Age of Onset   • Heart disease Father    • Cancer Father         Unspecified   • Heart disease Brother    • Colon polyps Mother         Social History     Social History Narrative    Lives with spouse       Objective     Vital Signs:   /42 (BP Location: Left arm, Patient Position: Sitting, Cuff Size: Adult)   Pulse 63   Ht 149.9 cm (59\")  "  Wt 57 kg (125 lb 9.6 oz)   SpO2 100%   BMI 25.37 kg/m²     Body mass index is 25.37 kg/m².    Physical Exam  Constitutional:       General: She is not in acute distress.     Appearance: Normal appearance. She is well-developed and normal weight.   Eyes:      Conjunctiva/sclera: Conjunctivae normal.      Pupils: Pupils are equal, round, and reactive to light.      Visual Fields: Right eye visual fields normal and left eye visual fields normal.   Cardiovascular:      Rate and Rhythm: Normal rate and regular rhythm.      Heart sounds: Normal heart sounds.   Pulmonary:      Effort: Pulmonary effort is normal. No retractions.      Breath sounds: Normal breath sounds and air entry.      Comments: Inspection of chest: normal appearance  Abdominal:      General: Bowel sounds are normal.      Palpations: Abdomen is soft.      Tenderness: There is no abdominal tenderness.      Comments: No appreciable hepatosplenomegaly   Musculoskeletal:      Cervical back: Neck supple.      Right lower leg: No edema.      Left lower leg: No edema.   Lymphadenopathy:      Cervical: No cervical adenopathy.   Skin:     Findings: No lesion.      Comments: Turgor normal   Neurological:      Mental Status: She is alert and oriented to person, place, and time.   Psychiatric:         Mood and Affect: Mood and affect normal.                 Assessment and Plan    Diagnoses and all orders for this visit:    1. History of diverticulitis (Primary)    2. Altered bowel habits    3. Diarrhea, unspecified type    4. Gastroesophageal reflux disease, unspecified whether esophagitis present    Other orders  -     dicyclomine (BENTYL) 20 MG tablet; Take 1 tablet by mouth Every 6 (Six) Hours.  Dispense: 90 tablet; Refill: 1    71-year-old female presenting the office today as a new patient with a history of diarrhea, GERD and diverticulitis.  Patient was given Bentyl for short course by her primary care I have called the pharmacy to verify that she was not  on any other medications for diverticulitis.  I will therefore prescribe the patient Augmentin take 1 tablet twice daily for 10 days.  I have also prescribed Bentyl for refill as this has helped her diarrhea in the past.  I have educated the patient to add Metamucil to her current regimen to help avoid diverticulitis in the future.  I have educated the patient on symptoms to watch for including fever nausea vomiting worsening diarrhea melena, hematochezia, worsening abdominal pain and went to be seen in the emergency department.  Patient is agreeable to this plan.  We will follow up in the office in 1 month.  We will consider EGD and colonoscopy at that time.  Patient call the office with any questions or concerns.            Follow Up   Return in about 1 month (around 9/30/2021).  Patient was given instructions and counseling regarding her condition or for health maintenance advice. Please see specific information pulled into the AVS if appropriate.

## 2021-08-31 ENCOUNTER — HOSPITAL ENCOUNTER (OUTPATIENT)
Dept: NUCLEAR MEDICINE | Facility: HOSPITAL | Age: 71
Discharge: HOME | End: 2021-08-31

## 2021-08-31 ENCOUNTER — TELEPHONE (OUTPATIENT)
Dept: CARDIOLOGY | Facility: CLINIC | Age: 71
End: 2021-08-31

## 2021-08-31 DIAGNOSIS — I10 ESSENTIAL HYPERTENSION: ICD-10-CM

## 2021-08-31 DIAGNOSIS — E78.5 HYPERLIPIDEMIA LDL GOAL <70: ICD-10-CM

## 2021-08-31 DIAGNOSIS — F17.210 CIGARETTE NICOTINE DEPENDENCE WITHOUT COMPLICATION: ICD-10-CM

## 2021-08-31 DIAGNOSIS — I25.118 CORONARY ARTERY DISEASE INVOLVING NATIVE CORONARY ARTERY OF NATIVE HEART WITH OTHER FORM OF ANGINA PECTORIS (HCC): ICD-10-CM

## 2021-08-31 LAB
ALBUMIN SERPL-MCNC: 4.5 G/DL (ref 3.5–5.2)
ALBUMIN/GLOB SERPL: 1.7 G/DL
ALP SERPL-CCNC: 103 U/L (ref 39–117)
ALT SERPL W P-5'-P-CCNC: 7 U/L (ref 1–33)
ANION GAP SERPL CALCULATED.3IONS-SCNC: 10.8 MMOL/L (ref 5–15)
AST SERPL-CCNC: 16 U/L (ref 1–32)
BASOPHILS # BLD AUTO: 0.02 10*3/MM3 (ref 0–0.2)
BASOPHILS NFR BLD AUTO: 0.2 % (ref 0–1.5)
BILIRUB SERPL-MCNC: 0.3 MG/DL (ref 0–1.2)
BUN SERPL-MCNC: 10 MG/DL (ref 8–23)
BUN/CREAT SERPL: 16.1 (ref 7–25)
CALCIUM SPEC-SCNC: 9.8 MG/DL (ref 8.6–10.5)
CHLORIDE SERPL-SCNC: 105 MMOL/L (ref 98–107)
CHOLEST SERPL-MCNC: 268 MG/DL (ref 0–200)
CO2 SERPL-SCNC: 25.2 MMOL/L (ref 22–29)
CREAT SERPL-MCNC: 0.62 MG/DL (ref 0.57–1)
DEPRECATED RDW RBC AUTO: 44.6 FL (ref 37–54)
EOSINOPHIL # BLD AUTO: 0.09 10*3/MM3 (ref 0–0.4)
EOSINOPHIL NFR BLD AUTO: 0.9 % (ref 0.3–6.2)
ERYTHROCYTE [DISTWIDTH] IN BLOOD BY AUTOMATED COUNT: 13.9 % (ref 12.3–15.4)
GFR SERPL CREATININE-BSD FRML MDRD: 95 ML/MIN/1.73
GLOBULIN UR ELPH-MCNC: 2.6 GM/DL
GLUCOSE SERPL-MCNC: 108 MG/DL (ref 65–99)
HCT VFR BLD AUTO: 44.1 % (ref 34–46.6)
HDLC SERPL-MCNC: 44 MG/DL (ref 40–60)
HGB BLD-MCNC: 14.2 G/DL (ref 12–15.9)
IMM GRANULOCYTES # BLD AUTO: 0.03 10*3/MM3 (ref 0–0.05)
IMM GRANULOCYTES NFR BLD AUTO: 0.3 % (ref 0–0.5)
LDLC SERPL CALC-MCNC: 189 MG/DL (ref 0–100)
LDLC/HDLC SERPL: 4.25 {RATIO}
LYMPHOCYTES # BLD AUTO: 1.96 10*3/MM3 (ref 0.7–3.1)
LYMPHOCYTES NFR BLD AUTO: 20.6 % (ref 19.6–45.3)
MCH RBC QN AUTO: 28.2 PG (ref 26.6–33)
MCHC RBC AUTO-ENTMCNC: 32.2 G/DL (ref 31.5–35.7)
MCV RBC AUTO: 87.7 FL (ref 79–97)
MONOCYTES # BLD AUTO: 0.49 10*3/MM3 (ref 0.1–0.9)
MONOCYTES NFR BLD AUTO: 5.1 % (ref 5–12)
NEUTROPHILS NFR BLD AUTO: 6.94 10*3/MM3 (ref 1.7–7)
NEUTROPHILS NFR BLD AUTO: 72.9 % (ref 42.7–76)
NRBC BLD AUTO-RTO: 0 /100 WBC (ref 0–0.2)
PLATELET # BLD AUTO: 403 10*3/MM3 (ref 140–450)
PMV BLD AUTO: 9.8 FL (ref 6–12)
POTASSIUM SERPL-SCNC: 4.3 MMOL/L (ref 3.5–5.2)
PROT SERPL-MCNC: 7.1 G/DL (ref 6–8.5)
RBC # BLD AUTO: 5.03 10*6/MM3 (ref 3.77–5.28)
SODIUM SERPL-SCNC: 141 MMOL/L (ref 136–145)
T-UPTAKE NFR SERPL: 1.16 TBI (ref 0.8–1.3)
T4 SERPL-MCNC: 8.68 MCG/DL (ref 4.5–11.7)
TRIGL SERPL-MCNC: 185 MG/DL (ref 0–150)
TSH SERPL DL<=0.05 MIU/L-ACNC: 0.42 UIU/ML (ref 0.27–4.2)
VLDLC SERPL-MCNC: 35 MG/DL (ref 5–40)
WBC # BLD AUTO: 9.53 10*3/MM3 (ref 3.4–10.8)

## 2021-08-31 PROCEDURE — 93017 CV STRESS TEST TRACING ONLY: CPT

## 2021-08-31 PROCEDURE — 85025 COMPLETE CBC W/AUTO DIFF WBC: CPT | Performed by: INTERNAL MEDICINE

## 2021-08-31 PROCEDURE — 80053 COMPREHEN METABOLIC PANEL: CPT | Performed by: INTERNAL MEDICINE

## 2021-08-31 PROCEDURE — 93018 CV STRESS TEST I&R ONLY: CPT | Performed by: INTERNAL MEDICINE

## 2021-08-31 PROCEDURE — 0 TECHNETIUM TETROFOSMIN KIT: Performed by: INTERNAL MEDICINE

## 2021-08-31 PROCEDURE — 78452 HT MUSCLE IMAGE SPECT MULT: CPT | Performed by: INTERNAL MEDICINE

## 2021-08-31 PROCEDURE — 84436 ASSAY OF TOTAL THYROXINE: CPT | Performed by: INTERNAL MEDICINE

## 2021-08-31 PROCEDURE — 25010000002 REGADENOSON 0.4 MG/5ML SOLUTION: Performed by: INTERNAL MEDICINE

## 2021-08-31 PROCEDURE — 84479 ASSAY OF THYROID (T3 OR T4): CPT | Performed by: INTERNAL MEDICINE

## 2021-08-31 PROCEDURE — 78452 HT MUSCLE IMAGE SPECT MULT: CPT

## 2021-08-31 PROCEDURE — A9502 TC99M TETROFOSMIN: HCPCS | Performed by: INTERNAL MEDICINE

## 2021-08-31 PROCEDURE — 93016 CV STRESS TEST SUPVJ ONLY: CPT | Performed by: NURSE PRACTITIONER

## 2021-08-31 PROCEDURE — 80061 LIPID PANEL: CPT | Performed by: INTERNAL MEDICINE

## 2021-08-31 PROCEDURE — 84443 ASSAY THYROID STIM HORMONE: CPT | Performed by: INTERNAL MEDICINE

## 2021-08-31 RX ADMIN — TETROFOSMIN 1 DOSE: 1.38 INJECTION, POWDER, LYOPHILIZED, FOR SOLUTION INTRAVENOUS at 13:35

## 2021-08-31 RX ADMIN — REGADENOSON 0.4 MG: 0.08 INJECTION, SOLUTION INTRAVENOUS at 13:35

## 2021-08-31 RX ADMIN — TETROFOSMIN 1 DOSE: 1.38 INJECTION, POWDER, LYOPHILIZED, FOR SOLUTION INTRAVENOUS at 12:08

## 2021-08-31 NOTE — TELEPHONE ENCOUNTER
Procedure: Lumbar Epidural Steroid Injection Therapy    Med Directive: Plavix    PMH: CAD; Hyperlipidemia; HTN; MI    Last Seen: 8/11/2021    HAD SPECT TODAY, 8/31/2021

## 2021-09-01 ENCOUNTER — TELEPHONE (OUTPATIENT)
Dept: CARDIOLOGY | Facility: CLINIC | Age: 71
End: 2021-09-01

## 2021-09-01 LAB
BH CV IMMEDIATE POST RECOVERY TECH DATA SYMPTOMS: NORMAL
BH CV IMMEDIATE POST TECH DATA BLOOD PRESSURE: NORMAL MMHG
BH CV IMMEDIATE POST TECH DATA HEART RATE: 85 BPM
BH CV IMMEDIATE POST TECH DATA OXYGEN SATS: 98 %
BH CV REST NUCLEAR ISOTOPE DOSE: 9.7 MCI
BH CV SIX MINUTE RECOVERY TECH DATA BLOOD PRESSURE: NORMAL
BH CV SIX MINUTE RECOVERY TECH DATA HEART RATE: 80 BPM
BH CV SIX MINUTE RECOVERY TECH DATA OXYGEN SATURATION: 97 %
BH CV SIX MINUTE RECOVERY TECH DATA SYMPTOMS: NORMAL
BH CV STRESS BP STAGE 1: NORMAL
BH CV STRESS COMMENTS STAGE 1: NORMAL
BH CV STRESS DOSE REGADENOSON STAGE 1: 0.4
BH CV STRESS DURATION MIN STAGE 1: 0
BH CV STRESS DURATION SEC STAGE 1: 10
BH CV STRESS HR STAGE 1: 67
BH CV STRESS NUCLEAR ISOTOPE DOSE: 34.6 MCI
BH CV STRESS O2 STAGE 1: 97
BH CV STRESS PROTOCOL 1: NORMAL
BH CV STRESS RECOVERY BP: NORMAL MMHG
BH CV STRESS RECOVERY HR: 80 BPM
BH CV STRESS RECOVERY O2: 97 %
BH CV STRESS STAGE 1: 1
BH CV THREE MINUTE POST TECH DATA BLOOD PRESSURE: NORMAL MMHG
BH CV THREE MINUTE POST TECH DATA HEART RATE: 79 BPM
BH CV THREE MINUTE POST TECH DATA OXYGEN SATURATION: 98 %
LV EF NUC BP: 86 %
MAXIMAL PREDICTED HEART RATE: 149 BPM
PERCENT MAX PREDICTED HR: 55.03 %
STRESS BASELINE BP: NORMAL MMHG
STRESS BASELINE HR: 69 BPM
STRESS O2 SAT REST: 96 %
STRESS PERCENT HR: 65 %
STRESS POST O2 SAT PEAK: 97 %
STRESS POST PEAK BP: NORMAL MMHG
STRESS POST PEAK HR: 82 BPM
STRESS TARGET HR: 127 BPM

## 2021-09-01 RX ORDER — CARVEDILOL 6.25 MG/1
6.25 TABLET ORAL 2 TIMES DAILY
Qty: 180 TABLET | Refills: 3 | Status: SHIPPED | OUTPATIENT
Start: 2021-09-01 | End: 2022-08-12 | Stop reason: SDUPTHER

## 2021-09-01 NOTE — TELEPHONE ENCOUNTER
Reviewed with Dr Gambino who would like patient to increase carvedilol 6.25 mg BID.     S/W patient and she is aware.

## 2021-09-01 NOTE — TELEPHONE ENCOUNTER
S/W patient regarding results of stress test, which showed no indication of blockages and good blood supply to the heart.     Asked patient if she was having any chest pain and she said that she has intermittent chest heaviness with exertion. Stated that she has not had to take any nitroglycerin.    Advised I would discuss with Dr. Gambino and call back with recommendations and when to follow up.

## 2021-09-03 ENCOUNTER — TELEPHONE (OUTPATIENT)
Dept: CARDIOLOGY | Facility: CLINIC | Age: 71
End: 2021-09-03

## 2021-09-03 NOTE — TELEPHONE ENCOUNTER
----- Message from ILENE Wick sent at 9/2/2021 12:12 PM EDT -----  Cholesterol is extremely high.  Please let us know if you're not able to obtain Repatha through your pharmacy.

## 2021-10-08 ENCOUNTER — TELEPHONE (OUTPATIENT)
Dept: CARDIOLOGY | Facility: CLINIC | Age: 71
End: 2021-10-08

## 2021-10-20 NOTE — TELEPHONE ENCOUNTER
Patient needed medication sent to the correct pharmacy.     Patient last OV 08.11.21     Medication was documented at that visit.

## 2021-10-20 NOTE — PROGRESS NOTES
Chief Complaint   Diarrhea, and gastroesophageal reflux disease     History of Present Illness       Thi Lynn is a 71 y.o. female who presents to [facility] for follow-up with a history of diarrhea, reflux and diverticulitis.  At the last office visit patient was prescribed Augmentin twice daily for 10 days related to her continued diarrhea with history of diverticulitis.  She was continued on Metamucil for her history of diverticulitis.  Patient reports today that she is feeling great and denies any abdominal pain, diarrhea, bloating.  She is having a bowel movement daily that is normal consistency.  She is taking her Metamucil daily as well as a probiotic.  Patient denies fever, nausea, vomiting, weight loss, night sweats, melena, hematochezia, hematemesis.    Colonoscopy: Review of the patient's most recent colonoscopy performed by Dr. Nieves on 10/15/2019 revealed diverticula of the sigmoid colon grade 1 internal hemorrhoids.  Repeat colonoscopy 2024.  Patient states she had an EGD with Dr. Franco Merrill about ten years ago.       Results       Result Review :       CMP    CMP 8/31/21   Glucose 108 (A)   BUN 10   Creatinine 0.62   eGFR Non African Am 95   Sodium 141   Potassium 4.3   Chloride 105   Calcium 9.8   Albumin 4.50   Total Bilirubin 0.3   Alkaline Phosphatase 103   AST (SGOT) 16   ALT (SGPT) 7   (A) Abnormal value            CBC    CBC 8/31/21   WBC 9.53   RBC 5.03   Hemoglobin 14.2   Hematocrit 44.1   MCV 87.7   MCH 28.2   MCHC 32.2   RDW 13.9   Platelets 403                     Past Medical History       Past Medical History:   Diagnosis Date   • Arthritis    • Chest pain    • COPD (chronic obstructive pulmonary disease) (HCC)    • Heart disease    • Hyperlipemia    • Hypertension    • Limb swelling    • Myocardial infarction (HCC)    • Seasonal allergies    • Stomach disorder        Past Surgical History:   Procedure Laterality Date   • ARTERIAL BYPASS  SURGERY     • CARDIAC SURGERY     • CATARACT EXTRACTION      Cataract Surgery   • CHOLECYSTECTOMY     • COLONOSCOPY  2019   • ENDOSCOPY      + 10 years Franco Merrill in [place]   • HYSTERECTOMY           Current Outpatient Medications:   •  amLODIPine (NORVASC) 10 MG tablet, Take 10 mg by mouth Every Night., Disp: , Rfl:   •  carvedilol (COREG) 6.25 MG tablet, Take 1 tablet by mouth 2 (Two) Times a Day., Disp: 180 tablet, Rfl: 3  •  clopidogrel (PLAVIX) 75 MG tablet, Take 1 tablet by mouth Daily., Disp: 90 tablet, Rfl: 3  •  Evolocumab (REPATHA) solution prefilled syringe injection, Inject 1 mL under the skin into the appropriate area as directed Every 14 (Fourteen) Days., Disp: 6 each, Rfl: 3  •  ezetimibe (ZETIA) 10 MG tablet, Take 1 tablet by mouth Daily., Disp: 90 tablet, Rfl: 3  •  HYDROcodone-acetaminophen (NORCO)  MG per tablet, Take 1 tablet by mouth Every 6 (Six) Hours As Needed., Disp: , Rfl:   •  irbesartan (AVAPRO) 300 MG tablet, Take 1 tablet by mouth Daily., Disp: , Rfl:   •  metFORMIN ER (GLUCOPHAGE-XR) 500 MG 24 hr tablet, Take 500 mg by mouth Daily With Breakfast., Disp: , Rfl:   •  nitroglycerin (NITRODUR) 0.2 MG/HR patch, Place 1 patch on the skin as directed by provider Daily., Disp: 30 patch, Rfl: 11  •  nitroglycerin (NITROSTAT) 0.4 MG SL tablet, 1 under the tongue as needed for angina, may repeat q5mins for up three doses, Disp: 100 tablet, Rfl: 11  •  pantoprazole (PROTONIX) 40 MG pack packet, Take 1 tablet by mouth Daily., Disp: , Rfl:      Allergies   Allergen Reactions   • Morphine Unknown - High Severity   • Oxycodone-Acetaminophen Unknown - High Severity   • Statins Myalgia   • Sulfa Antibiotics Unknown - High Severity       Family History   Problem Relation Age of Onset   • Heart disease Father    • Cancer Father         Unspecified   • Heart disease Brother    • Colon polyps Mother         Social History     Social History Narrative    Lives with spouse       Objective  "      Vital Signs:   BP (!) 140/37 (BP Location: Right arm, Patient Position: Sitting, Cuff Size: Adult)   Pulse 66   Ht 149.9 cm (59\")   Wt 57.4 kg (126 lb 8 oz)   SpO2 100%   BMI 25.55 kg/m²       Physical Exam  Constitutional:       General: She is not in acute distress.     Appearance: Normal appearance.   HENT:      Head: Normocephalic.   Eyes:      Conjunctiva/sclera: Conjunctivae normal.      Pupils: Pupils are equal, round, and reactive to light.      Visual Fields: Right eye visual fields normal and left eye visual fields normal.   Neck:      Trachea: Trachea normal.   Cardiovascular:      Rate and Rhythm: Normal rate and regular rhythm.      Heart sounds: Normal heart sounds.   Pulmonary:      Effort: Pulmonary effort is normal.      Breath sounds: Normal breath sounds and air entry.      Comments: Inspection of chest: normal appearance  Abdominal:      General: Abdomen is flat. Bowel sounds are normal.      Palpations: Abdomen is soft. There is no mass.      Tenderness: There is no guarding.   Musculoskeletal:      Right lower leg: No edema.      Left lower leg: No edema.   Skin:     Findings: No lesion.      Comments: Turgor is normal   Neurological:      Mental Status: She is alert and oriented to person, place, and time.   Psychiatric:         Mood and Affect: Mood and affect normal.           Assessment & Plan          Assessment and Plan {CC Problem List  Visit Diagnosis  ROS  Review (Popup)  Health Maintenance  Quality  BestPractice  Medications  SmartSets  SnapShot Encounters  Media :23}   Diagnoses and all orders for this visit:    1. Altered bowel habits (Primary)    2. Gastroesophageal reflux disease, unspecified whether esophagitis present    3. Diverticula of colon    71-year-old female presented to the office today for 6-week follow-up with a history of diverticulitis.  Patient's diarrhea has resolved since treatment with Augmentin patient reports she is doing much better.  " She would like to defer colonoscopy and EGD at this time since symptoms have improved.  Patient will continue Metamucil and probiotics.  She will follow up on an as-needed basis.  Patient agreeable to this plan and will be due for repeat colonoscopy in 2024.            Follow Up       Follow Up   Return if symptoms worsen or fail to improve.  Patient was given instructions and counseling regarding her condition or for health maintenance advice. Please see specific information pulled into the AVS if appropriate.

## 2021-10-22 ENCOUNTER — OFFICE VISIT (OUTPATIENT)
Dept: GASTROENTEROLOGY | Facility: CLINIC | Age: 71
End: 2021-10-22

## 2021-10-22 VITALS
WEIGHT: 126.5 LBS | DIASTOLIC BLOOD PRESSURE: 37 MMHG | BODY MASS INDEX: 25.5 KG/M2 | OXYGEN SATURATION: 100 % | HEART RATE: 66 BPM | HEIGHT: 59 IN | SYSTOLIC BLOOD PRESSURE: 140 MMHG

## 2021-10-22 DIAGNOSIS — K21.9 GASTROESOPHAGEAL REFLUX DISEASE, UNSPECIFIED WHETHER ESOPHAGITIS PRESENT: ICD-10-CM

## 2021-10-22 DIAGNOSIS — R19.4 ALTERED BOWEL HABITS: Primary | ICD-10-CM

## 2021-10-22 DIAGNOSIS — K57.30 DIVERTICULA OF COLON: ICD-10-CM

## 2021-10-22 PROCEDURE — 99213 OFFICE O/P EST LOW 20 MIN: CPT | Performed by: NURSE PRACTITIONER

## 2022-03-09 ENCOUNTER — TELEPHONE (OUTPATIENT)
Dept: CARDIOLOGY | Facility: CLINIC | Age: 72
End: 2022-03-09

## 2022-03-09 NOTE — TELEPHONE ENCOUNTER
Procedure: Lumbar Epidural Steroid Injection Therapy     Med Directive: Plavix (requesting 7 days pre and 12 hours post).     PMH: CAD; Hyperlipidemia; HTN; MI     Last Seen: 8/11/2021

## 2022-08-12 ENCOUNTER — TELEPHONE (OUTPATIENT)
Dept: CARDIOLOGY | Facility: CLINIC | Age: 72
End: 2022-08-12

## 2022-08-12 ENCOUNTER — OFFICE VISIT (OUTPATIENT)
Dept: CARDIOLOGY | Facility: CLINIC | Age: 72
End: 2022-08-12

## 2022-08-12 VITALS
DIASTOLIC BLOOD PRESSURE: 70 MMHG | WEIGHT: 126 LBS | SYSTOLIC BLOOD PRESSURE: 155 MMHG | HEIGHT: 59 IN | HEART RATE: 66 BPM | BODY MASS INDEX: 25.4 KG/M2

## 2022-08-12 DIAGNOSIS — E78.5 HYPERLIPIDEMIA LDL GOAL <70: ICD-10-CM

## 2022-08-12 DIAGNOSIS — I20.9 ANGINA PECTORIS: ICD-10-CM

## 2022-08-12 DIAGNOSIS — I25.118 CORONARY ARTERY DISEASE INVOLVING NATIVE CORONARY ARTERY OF NATIVE HEART WITH OTHER FORM OF ANGINA PECTORIS: Primary | ICD-10-CM

## 2022-08-12 DIAGNOSIS — I10 ESSENTIAL HYPERTENSION: ICD-10-CM

## 2022-08-12 PROCEDURE — 99204 OFFICE O/P NEW MOD 45 MIN: CPT | Performed by: INTERNAL MEDICINE

## 2022-08-12 RX ORDER — TRAZODONE HYDROCHLORIDE 50 MG/1
TABLET ORAL NIGHTLY
COMMUNITY
Start: 2022-08-08

## 2022-08-12 RX ORDER — CARVEDILOL 6.25 MG/1
6.25 TABLET ORAL 2 TIMES DAILY
Qty: 180 TABLET | Refills: 3 | Status: SHIPPED | OUTPATIENT
Start: 2022-08-12

## 2022-08-12 RX ORDER — VARENICLINE TARTRATE 1 MG/1
1 TABLET, FILM COATED ORAL 2 TIMES DAILY
Qty: 56 TABLET | Refills: 4 | Status: SHIPPED | OUTPATIENT
Start: 2022-09-09 | End: 2023-01-27

## 2022-08-12 NOTE — PROGRESS NOTES
Chief Complaint  Coronary Artery Disease, Hypertension, and Hyperlipidemia    Subjective            Thi Lynn presents to [facility]  History of present illness:    Patient states that she has been having upper back pain.  She states it is similar to the pain she had before her bypass.  It is mainly when she is up moving around but eases when she sits down.  She is still smoking 6 to 7 cigarettes a day.  She states her blood pressure is up and down.  It can run fine but a lot of times will run in the 150 mmHg systolic range.  She notes no bleeding problems.      Past Medical History:   Diagnosis Date   • Arthritis    • Chest pain    • COPD (chronic obstructive pulmonary disease) ([facility])    • Heart disease    • Hyperlipemia    • Hypertension    • Limb swelling    • Myocardial infarction (HCC)    • Seasonal allergies    • Stomach disorder            Past Surgical History:   Procedure Laterality Date   • ARTERIAL BYPASS SURGERY     • CARDIAC SURGERY     • CATARACT EXTRACTION      Cataract Surgery   • CHOLECYSTECTOMY     • COLONOSCOPY  2019   • ENDOSCOPY      + 10 years Franco Merrill in [place]   • HYSTERECTOMY            Social History     Socioeconomic History   • Marital status:    Tobacco Use   • Smoking status: Current Every Day Smoker     Packs/day: 0.25   • Smokeless tobacco: Never Used   Vaping Use   • Vaping Use: Never used   Substance and Sexual Activity   • Alcohol use: Never   • Drug use: Never   • Sexual activity: Defer           Family History   Problem Relation Age of Onset   • Heart disease Father    • Cancer Father         Unspecified   • Heart disease Brother    • Colon polyps Mother             Allergies   Allergen Reactions   • Morphine Unknown - High Severity   • Oxycodone-Acetaminophen Unknown - High Severity   • Statins Myalgia   • Sulfa Antibiotics Unknown - High Severity            Current Outpatient Medications:   •  amLODIPine (NORVASC) 10 MG tablet,  "Take 10 mg by mouth Every Night., Disp: , Rfl:   •  carvedilol (COREG) 6.25 MG tablet, Take 1 tablet by mouth 2 (Two) Times a Day., Disp: 180 tablet, Rfl: 3  •  clopidogrel (PLAVIX) 75 MG tablet, Take 1 tablet by mouth Daily., Disp: 90 tablet, Rfl: 3  •  Evolocumab (REPATHA) solution prefilled syringe injection, Inject 1 mL under the skin into the appropriate area as directed Every 14 (Fourteen) Days., Disp: 6 each, Rfl: 3  •  ezetimibe (ZETIA) 10 MG tablet, Take 1 tablet by mouth Daily., Disp: 90 tablet, Rfl: 3  •  metFORMIN ER (GLUCOPHAGE-XR) 500 MG 24 hr tablet, Take 500 mg by mouth Daily With Breakfast., Disp: , Rfl:   •  nitroglycerin (NITRODUR) 0.2 MG/HR patch, Place 1 patch on the skin as directed by provider Daily., Disp: 30 patch, Rfl: 11  •  nitroglycerin (NITROSTAT) 0.4 MG SL tablet, 1 under the tongue as needed for angina, may repeat q5mins for up three doses, Disp: 100 tablet, Rfl: 11  •  pantoprazole (PROTONIX) 40 MG pack packet, Take 1 tablet by mouth Daily., Disp: , Rfl:   •  traZODone (DESYREL) 50 MG tablet, Every Night., Disp: , Rfl:   •  varenicline (CHANTIX GREG) 0.5 MG X 11 & 1 MG X 42 tablet, Take 0.5 mg po daily x 3 days, then 0.5 mg po bid x 4 days, then 1 mg po bid, Disp: 53 tablet, Rfl: 0  •  [START ON 9/9/2022] varenicline (CHANTIX) 1 MG tablet, Take 1 tablet by mouth 2 (Two) Times a Day for 140 days., Disp: 56 tablet, Rfl: 4      ROS:  Cardiac review of systems positive for angina    Objective     /70   Pulse 66   Ht 149.9 cm (59\")   Wt 57.2 kg (126 lb)   BMI 25.45 kg/m²       General Appearance:   · well developed  · well nourished  HENT:   · oropharynx moist  · lips not cyanotic  Respiratory:  · no respiratory distress  · normal breath sounds  · no rales  Cardiovascular:  · no jugular venous distention  · regular rhythm  · S1 normal, S2 normal  · no S3, no S4   · no murmur  · no rub, no thrill  · No carotid bruit  · pedal pulses normal  · lower extremity edema: none  "   Musculoskeletal:  · no clubbing of fingers.   · normocephalic, head atraumatic  Skin:   · warm, dry  Psychiatric:  · judgement and insight appropriate  · normal mood and affect          Procedures                      ASSESSMENT:  Encounter Diagnoses   Name Primary?   • Coronary artery disease involving native coronary artery of native heart with other form of angina pectoris (HCC) Yes   • Essential hypertension    • Hyperlipidemia LDL goal <70    • Angina pectoris (HCC)          PLAN:    1.  I worry about this pain across her shoulder blades as being her anginal equivalent as she had similar pain that was found to be cardiac in nature at the time of her bypass back in 2008 range.  I have asked her to get a [employer] Cardiolite.  She is unable to walk on the treadmill due to chronic leg symptoms.  2.  Patient does have bilateral leg weakness when she is walking.  I do feel pulses in the right leg better than the left leg.  She does have other issues including back issues and knee issues.  It sounds like she did have lower extremity studies in the distant past.  3.  We will increase patient's Coreg to 6.25 twice daily.  4.  Will send in the Repatha injections.  She will continue the Zetia.  She is intolerant of statins.  5.  Encourage smoking cessation.  The patient is in agreement to try Chantix and we will call this in.  6.  Continue the Plavix.  She apparently has been on this since the bypass.  She does not take aspirin.          Patient was given instructions and counseling regarding her condition or for health maintenance advice. Please see specific information pulled into the AVS if appropriate.         Jason Nichols MD   8/12/2022  12:02 EDT

## 2022-08-15 NOTE — TELEPHONE ENCOUNTER
Your PA has been resolved, no additional PA is required. For further inquiries please contact the number on the back of the member prescription card. (Message [phone])

## 2022-08-26 RX ORDER — CLOPIDOGREL BISULFATE 75 MG/1
75 TABLET ORAL DAILY
Qty: 90 TABLET | Refills: 3 | Status: SHIPPED | OUTPATIENT
Start: 2022-08-26

## 2022-09-09 ENCOUNTER — APPOINTMENT (OUTPATIENT)
Dept: NUCLEAR MEDICINE | Facility: HOSPITAL | Age: 72
End: 2022-09-09

## 2022-10-07 ENCOUNTER — APPOINTMENT (OUTPATIENT)
Dept: NUCLEAR MEDICINE | Facility: HOSPITAL | Age: 72
End: 2022-10-07

## 2022-11-04 ENCOUNTER — APPOINTMENT (OUTPATIENT)
Dept: NUCLEAR MEDICINE | Facility: HOSPITAL | Age: 72
End: 2022-11-04

## 2022-11-11 ENCOUNTER — APPOINTMENT (OUTPATIENT)
Dept: NUCLEAR MEDICINE | Facility: HOSPITAL | Age: 72
End: 2022-11-11

## 2023-03-07 ENCOUNTER — OFFICE VISIT (OUTPATIENT)
Dept: CARDIOLOGY | Facility: CLINIC | Age: 73
End: 2023-03-07
Payer: MEDICARE

## 2023-03-07 VITALS
DIASTOLIC BLOOD PRESSURE: 67 MMHG | WEIGHT: 126 LBS | SYSTOLIC BLOOD PRESSURE: 121 MMHG | HEIGHT: 59 IN | HEART RATE: 67 BPM | BODY MASS INDEX: 25.4 KG/M2

## 2023-03-07 DIAGNOSIS — R01.1 HEART MURMUR: ICD-10-CM

## 2023-03-07 DIAGNOSIS — I10 ESSENTIAL HYPERTENSION: ICD-10-CM

## 2023-03-07 DIAGNOSIS — I25.118 CORONARY ARTERY DISEASE INVOLVING NATIVE CORONARY ARTERY OF NATIVE HEART WITH OTHER FORM OF ANGINA PECTORIS: Primary | ICD-10-CM

## 2023-03-07 DIAGNOSIS — F17.210 CIGARETTE NICOTINE DEPENDENCE WITHOUT COMPLICATION: ICD-10-CM

## 2023-03-07 DIAGNOSIS — Z95.1 HX OF CABG: ICD-10-CM

## 2023-03-07 DIAGNOSIS — E78.5 HYPERLIPIDEMIA LDL GOAL <70: ICD-10-CM

## 2023-03-07 PROCEDURE — 99214 OFFICE O/P EST MOD 30 MIN: CPT

## 2023-03-07 RX ORDER — CETIRIZINE HYDROCHLORIDE 5 MG/1
5 TABLET ORAL DAILY
COMMUNITY

## 2023-03-07 NOTE — PROGRESS NOTES
Chief Complaint  Coronary Artery Disease, Hypertension, Hyperlipidemia, and Follow-up    Subjective        History of Present Illness  Thi Lynn presents to [facility] for follow up. Past medical history is outlined below, remarkable for coronary artery disease status post previous bypass surgery and MI in 2008, nicotine dependence, hypertension, hyperlipidemia, COPD.  She continues to have shoulder pain that radiates to her chest and feels like a tightening.  She notices it more when she exerts herself.  It is associated with dyspnea.  She was scheduled for stress test after her previous visit with Dr. Nichols where her complaints were the same but she was not able to have it done.  She is quite anxious over having a stress test.  She is compliant with her medications.  She denies any orthopnea, edema, syncope    PMH  Past Medical History:   Diagnosis Date   • Arthritis    • Chest pain    • COPD (chronic obstructive pulmonary disease) (HCC)    • Heart disease    • Hyperlipemia    • Hypertension    • Limb swelling    • Myocardial infarction (HCC)    • Seasonal allergies    • Stomach disorder        ALLERGY  Allergies   Allergen Reactions   • Morphine Unknown - High Severity   • Oxycodone-Acetaminophen Unknown - High Severity   • Statins Myalgia   • Sulfa Antibiotics Unknown - High Severity        SURGICALHX  Past Surgical History:   Procedure Laterality Date   • ARTERIAL BYPASS SURGERY     • CARDIAC SURGERY     • CATARACT EXTRACTION      Cataract Surgery   • CHOLECYSTECTOMY     • COLONOSCOPY  2019   • ENDOSCOPY      + 10 years Franco Merrill in [place]   • HYSTERECTOMY          SOC  Social History     Socioeconomic History   • Marital status:    Tobacco Use   • Smoking status: Every Day     Packs/day: 0.25     Types: Cigarettes   • Smokeless tobacco: Never   Vaping Use   • Vaping Use: Never used   Substance and Sexual Activity   • Alcohol use: Never   • Drug use: Never   •  "Sexual activity: Defer       FAMHX  Family History   Problem Relation Age of Onset   • Heart disease Father    • Cancer Father         Unspecified   • Heart disease Brother    • Colon polyps Mother         MEDSIGONLY  Current Outpatient Medications on File Prior to Visit   Medication Sig   • amLODIPine (NORVASC) 10 MG tablet Take 1 tablet by mouth Every Night.   • carvedilol (COREG) 6.25 MG tablet Take 1 tablet by mouth 2 (Two) Times a Day.   • cetirizine (zyrTEC) 5 MG tablet Take 1 tablet by mouth Daily.   • clopidogrel (PLAVIX) 75 MG tablet Take 1 tablet by mouth Daily.   • Evolocumab (REPATHA) solution prefilled syringe injection Inject 1 mL under the skin into the appropriate area as directed Every 14 (Fourteen) Days.   • ezetimibe (ZETIA) 10 MG tablet Take 1 tablet by mouth Daily.   • metFORMIN ER (GLUCOPHAGE-XR) 500 MG 24 hr tablet Take 1 tablet by mouth Daily With Breakfast.   • nitroglycerin (NITRODUR) 0.2 MG/HR patch Place 1 patch on the skin as directed by provider Daily.   • nitroglycerin (NITROSTAT) 0.4 MG SL tablet 1 under the tongue as needed for angina, may repeat q5mins for up three doses   • pantoprazole (PROTONIX) 40 MG pack packet Take 1 packet by mouth Daily.   • traZODone (DESYREL) 50 MG tablet Every Night.     No current facility-administered medications on file prior to visit.       Objective   Vitals:    03/07/23 1026 03/07/23 1035   BP: 146/69 121/67   Pulse: 62 67   Weight:  57.2 kg (126 lb)   Height: 149.9 cm (59\") 149.9 cm (59\")         Physical Exam  Constitutional:       General: She is awake. She is not in acute distress.     Appearance: Normal appearance.   HENT:      Head: Normocephalic.      Nose: Nose normal. No congestion.   Eyes:      Extraocular Movements: Extraocular movements intact.      Conjunctiva/sclera: Conjunctivae normal.      Pupils: Pupils are equal, round, and reactive to light.   Neck:      Thyroid: No thyromegaly.      Vascular: No JVD.   Cardiovascular:      Rate " and Rhythm: Normal rate and regular rhythm.      Chest Wall: PMI is not displaced.      Pulses: Normal pulses.      Heart sounds: S1 normal and S2 normal. Murmur heard.     No friction rub. No gallop. No S3 or S4 sounds.   Pulmonary:      Effort: Pulmonary effort is normal.      Breath sounds: Normal breath sounds. No wheezing, rhonchi or rales.   Abdominal:      General: Bowel sounds are normal.      Palpations: Abdomen is soft.      Tenderness: There is no abdominal tenderness.   Musculoskeletal:      Cervical back: No tenderness.      Right lower leg: No edema.      Left lower leg: No edema.   Lymphadenopathy:      Cervical: No cervical adenopathy.   Skin:     General: Skin is warm and dry.      Capillary Refill: Capillary refill takes less than 2 seconds.      Coloration: Skin is not cyanotic.      Findings: No petechiae or rash.      Nails: There is no clubbing.   Neurological:      Mental Status: She is alert.   Psychiatric:         Mood and Affect: Mood normal.         Behavior: Behavior is cooperative.           Result Review     The following data was reviewed by ILENE Serrano on 03/07/23.    No results found for: PROBNP             Results for orders placed during the hospital encounter of 08/31/21    Stress Test With Myocardial Perfusion One Day    Interpretation Summary  · Left ventricular ejection fraction is hyperdynamic (Calculated EF > 70%). .  · Myocardial perfusion imaging indicates a normal myocardial perfusion study with no evidence of ischemia.  · Impressions are consistent with a low risk study.  · Findings consistent with a normal ECG stress test.       Assessment and Plan   Diagnoses and all orders for this visit:    1. Coronary artery disease involving native coronary artery of native heart with other form of angina pectoris (HCC) (Primary)  -     Adult Transthoracic Echo Complete w/ Color, Spectral and Contrast if necessary per protocol; Future    2. Essential hypertension    3.  Hyperlipidemia LDL goal <70    4. Cigarette nicotine dependence without complication    5. Hx of CABG    6. Heart murmur  -     Adult Transthoracic Echo Complete w/ Color, Spectral and Contrast if necessary per protocol; Future      1.Taking medications as instructed without side effects.  I am concerned that her shoulder and chest pain could be her anginal equivalent.  She was scheduled for stress test but did not have it done.  She was advised that this would be the next step in determining the cause of her pain and she was urged to have the test done.  2. Hypertension is well controlled on current doses of antihypertensive medication. Continue current medications Dietary sodium restriction. Check blood pressures daily and record.   3.Lipid abnormalities are controlled. Target LDL for this patient is <70. Explained to her the respective contributions of genetics, diet, and exercise to lipid levels and encouraged healthy diet and routine aerobic exercise. Continue current medications.  4.The patient reports that she is currently smoking.  Complete cessation was advised. We discussed the risks of continued smoking including heart disease, lung disease, cancer.  Patient was offered nicotine patches or lozenges and we discussed options such as Chantix or BuSpar.  Patient is unwilling to quit at this time.  5.CABG in 2008- patient is unsure if it is 2 or 3 vessels.  Continue Plavix.    6.  She has a systolic murmur.  We will check an echo for valvular function and LVEF.    Follow Up   Return in about 6 months (around 9/7/2023) for With Dr. Nichols.    Patient was given instructions and counseling regarding her condition or for health maintenance advice. Please see specific information pulled into the AVS if appropriate.     Mackenzie Melara, ILENE  03/07/23  22:03 EST    Dictated Utilizing Dragon Dictation

## 2023-04-20 ENCOUNTER — HOSPITAL ENCOUNTER (OUTPATIENT)
Dept: NUCLEAR MEDICINE | Facility: HOSPITAL | Age: 73
Discharge: HOME | End: 2023-04-20
Payer: MEDICARE

## 2023-04-20 ENCOUNTER — HOSPITAL ENCOUNTER (OUTPATIENT)
Dept: CARDIOLOGY | Facility: HOSPITAL | Age: 73
Discharge: HOME | End: 2023-04-20
Payer: MEDICARE

## 2023-04-20 DIAGNOSIS — I25.118 CORONARY ARTERY DISEASE INVOLVING NATIVE CORONARY ARTERY OF NATIVE HEART WITH OTHER FORM OF ANGINA PECTORIS: ICD-10-CM

## 2023-04-20 DIAGNOSIS — R01.1 HEART MURMUR: ICD-10-CM

## 2023-04-20 LAB
BH CV IMMEDIATE POST TECH DATA BLOOD PRESSURE: NORMAL MMHG
BH CV IMMEDIATE POST TECH DATA HEART RATE: 80 BPM
BH CV IMMEDIATE POST TECH DATA OXYGEN SATS: 98 %
BH CV REST NUCLEAR ISOTOPE DOSE: 10 MCI
BH CV SIX MINUTE RECOVERY TECH DATA BLOOD PRESSURE: NORMAL
BH CV SIX MINUTE RECOVERY TECH DATA HEART RATE: 76 BPM
BH CV SIX MINUTE RECOVERY TECH DATA OXYGEN SATURATION: 96 %
BH CV STRESS BP STAGE 1: NORMAL
BH CV STRESS COMMENTS STAGE 1: NORMAL
BH CV STRESS DOSE REGADENOSON STAGE 1: 0.4
BH CV STRESS DURATION MIN STAGE 1: 0
BH CV STRESS DURATION SEC STAGE 1: 10
BH CV STRESS HR STAGE 1: 82
BH CV STRESS NUCLEAR ISOTOPE DOSE: 37.2 MCI
BH CV STRESS O2 STAGE 1: 98
BH CV STRESS PROTOCOL 1: NORMAL
BH CV STRESS RECOVERY BP: NORMAL MMHG
BH CV STRESS RECOVERY HR: 76 BPM
BH CV STRESS RECOVERY O2: 98 %
BH CV STRESS STAGE 1: 1
BH CV THREE MINUTE POST TECH DATA BLOOD PRESSURE: NORMAL MMHG
BH CV THREE MINUTE POST TECH DATA HEART RATE: 79 BPM
BH CV THREE MINUTE POST TECH DATA OXYGEN SATURATION: 97 %
LV EF NUC BP: 80 %
MAXIMAL PREDICTED HEART RATE: 148 BPM
PERCENT MAX PREDICTED HR: 55.41 %
STRESS BASELINE BP: NORMAL MMHG
STRESS BASELINE HR: 63 BPM
STRESS O2 SAT REST: 95 %
STRESS PERCENT HR: 65 %
STRESS POST O2 SAT PEAK: 96 %
STRESS POST PEAK BP: NORMAL MMHG
STRESS POST PEAK HR: 82 BPM
STRESS TARGET HR: 126 BPM

## 2023-04-20 PROCEDURE — 93018 CV STRESS TEST I&R ONLY: CPT | Performed by: INTERNAL MEDICINE

## 2023-04-20 PROCEDURE — A9502 TC99M TETROFOSMIN: HCPCS | Performed by: INTERNAL MEDICINE

## 2023-04-20 PROCEDURE — 78452 HT MUSCLE IMAGE SPECT MULT: CPT | Performed by: INTERNAL MEDICINE

## 2023-04-20 PROCEDURE — 93306 TTE W/DOPPLER COMPLETE: CPT

## 2023-04-20 PROCEDURE — 25010000002 REGADENOSON 0.4 MG/5ML SOLUTION: Performed by: INTERNAL MEDICINE

## 2023-04-20 PROCEDURE — 93017 CV STRESS TEST TRACING ONLY: CPT

## 2023-04-20 PROCEDURE — 0 TECHNETIUM TETROFOSMIN KIT: Performed by: INTERNAL MEDICINE

## 2023-04-20 PROCEDURE — 78452 HT MUSCLE IMAGE SPECT MULT: CPT

## 2023-04-20 RX ADMIN — TETROFOSMIN 1 DOSE: 1.38 INJECTION, POWDER, LYOPHILIZED, FOR SOLUTION INTRAVENOUS at 13:30

## 2023-04-20 RX ADMIN — TETROFOSMIN 1 DOSE: 1.38 INJECTION, POWDER, LYOPHILIZED, FOR SOLUTION INTRAVENOUS at 10:00

## 2023-04-20 RX ADMIN — REGADENOSON 0.4 MG: 0.08 INJECTION, SOLUTION INTRAVENOUS at 13:30

## 2023-04-21 LAB
BH CV ECHO MEAS - AO ROOT DIAM: 2.7 CM
BH CV ECHO MEAS - EDV(MOD-SP2): 58 ML
BH CV ECHO MEAS - EDV(MOD-SP4): 56 ML
BH CV ECHO MEAS - EF(MOD-BP): 74 %
BH CV ECHO MEAS - EF(MOD-SP2): 76 %
BH CV ECHO MEAS - EF(MOD-SP4): 73 %
BH CV ECHO MEAS - ESV(MOD-SP2): 14 ML
BH CV ECHO MEAS - ESV(MOD-SP4): 15 ML
BH CV ECHO MEAS - IVSD: 1.4 CM
BH CV ECHO MEAS - LA DIMENSION: 3.4 CM
BH CV ECHO MEAS - LAT PEAK E' VEL: 6.3 CM/SEC
BH CV ECHO MEAS - LVIDD: 3.4 CM
BH CV ECHO MEAS - LVIDS: 2 CM
BH CV ECHO MEAS - LVOT DIAM: 1.8 CM
BH CV ECHO MEAS - LVPWD: 1.2 CM
BH CV ECHO MEAS - MED PEAK E' VEL: 6.5 CM/SEC
BH CV ECHO MEAS - MR MAX PG: 91 MMHG
BH CV ECHO MEAS - MR MAX VEL: 476 CM/SEC
BH CV ECHO MEAS - MV A MAX VEL: 129 CM/SEC
BH CV ECHO MEAS - MV DEC TIME: 112 MSEC
BH CV ECHO MEAS - MV E MAX VEL: 111 CM/SEC
BH CV ECHO MEAS - MV E/A: 0.9
BH CV ECHO MEAS - RAP SYSTOLE: 3 MMHG
BH CV ECHO MEAS - RVDD: 2.4 CM
BH CV ECHO MEAS - RVSP: 31 MMHG
BH CV ECHO MEAS - TR MAX PG: 28 MMHG
BH CV ECHO MEAS - TR MAX VEL: 263 CM/SEC
BH CV ECHO MEASUREMENTS AVERAGE E/E' RATIO: 17.34
IVRT: 69 MSEC
LEFT ATRIUM VOLUME INDEX: 18.6 ML/M2
LEFT ATRIUM VOLUME: 28.2 ML
MAXIMAL PREDICTED HEART RATE: 148 BPM
STRESS TARGET HR: 126 BPM

## 2023-04-24 ENCOUNTER — TELEPHONE (OUTPATIENT)
Dept: CARDIOLOGY | Facility: CLINIC | Age: 73
End: 2023-04-24
Payer: MEDICARE

## 2023-04-24 NOTE — TELEPHONE ENCOUNTER
----- Message from ILENE Shaver sent at 4/24/2023  3:30 PM EDT -----  Notify patient the results of her  ultrasound of her  heart shows that the heart is functioning well and while there is some mild plaque in the aortic and mitral valves overall there is no concerning structural abnormality

## 2023-04-24 NOTE — PROGRESS NOTES
Notify patient the results of her  ultrasound of her  heart shows that the heart is functioning well and while there is some mild plaque in the aortic and mitral valves overall there is no concerning structural abnormality

## 2023-04-25 ENCOUNTER — OFFICE VISIT (OUTPATIENT)
Dept: CARDIOLOGY | Facility: CLINIC | Age: 73
End: 2023-04-25
Payer: MEDICARE

## 2023-04-25 VITALS
BODY MASS INDEX: 25.8 KG/M2 | WEIGHT: 128 LBS | DIASTOLIC BLOOD PRESSURE: 69 MMHG | HEART RATE: 60 BPM | HEIGHT: 59 IN | SYSTOLIC BLOOD PRESSURE: 148 MMHG

## 2023-04-25 DIAGNOSIS — I25.118 CORONARY ARTERY DISEASE INVOLVING NATIVE CORONARY ARTERY OF NATIVE HEART WITH OTHER FORM OF ANGINA PECTORIS: Primary | ICD-10-CM

## 2023-04-25 DIAGNOSIS — Z95.1 HX OF CABG: ICD-10-CM

## 2023-04-25 DIAGNOSIS — F17.210 CIGARETTE NICOTINE DEPENDENCE WITHOUT COMPLICATION: ICD-10-CM

## 2023-04-25 DIAGNOSIS — E78.5 HYPERLIPIDEMIA LDL GOAL <70: ICD-10-CM

## 2023-04-25 DIAGNOSIS — I10 ESSENTIAL HYPERTENSION: ICD-10-CM

## 2023-04-25 DIAGNOSIS — R60.0 LOWER EXTREMITY EDEMA: ICD-10-CM

## 2023-04-25 RX ORDER — NITROGLYCERIN 0.1MG/HR
PATCH, TRANSDERMAL 24 HOURS TRANSDERMAL EVERY 24 HOURS
COMMUNITY
End: 2023-05-01

## 2023-04-25 NOTE — PROGRESS NOTES
Chief Complaint  Hyperlipidemia, Hypertension, Coronary Artery Disease, Other forms of angina pectoris, Atrial Fibrillation, and hx of CABG    Subjective        History of Present Illness  Thi Lynn presents to [facility] for follow up. Past medical history is outlined below, remarkable for coronary artery disease status post previous bypass surgery and MI in 2008, nicotine dependence, hypertension, hyperlipidemia, COPD.  She continues to have shoulder pain that radiates to her chest and feels like a tightening.  She notices it more when she exerts herself.  It is associated with dyspnea.  She states it is been the same for the last several months.  She denies any orthopnea, edema, syncope.    Past Medical History:   Diagnosis Date   • Arthritis    • Chest pain    • COPD (chronic obstructive pulmonary disease)    • Heart disease    • Hyperlipemia    • Hypertension    • Limb swelling    • Myocardial infarction    • Seasonal allergies    • Stomach disorder        ALLERGY  Allergies   Allergen Reactions   • Morphine Unknown - High Severity   • Oxycodone-Acetaminophen Unknown - High Severity   • Statins Myalgia   • Sulfa Antibiotics Unknown - High Severity        Past Surgical History:   Procedure Laterality Date   • ARTERIAL BYPASS SURGERY     • CARDIAC SURGERY     • CATARACT EXTRACTION      Cataract Surgery   • CHOLECYSTECTOMY     • COLONOSCOPY  2019   • ENDOSCOPY      + 10 years Franco Merrill in [place]   • HYSTERECTOMY          Social History     Socioeconomic History   • Marital status:    Tobacco Use   • Smoking status: Every Day     Packs/day: 0.25     Types: Cigarettes   • Smokeless tobacco: Never   Vaping Use   • Vaping Use: Never used   Substance and Sexual Activity   • Alcohol use: Never   • Drug use: Never   • Sexual activity: Defer       Family History   Problem Relation Age of Onset   • Heart disease Father    • Cancer Father         Unspecified   • Heart  "disease Brother    • Colon polyps Mother         Current Outpatient Medications on File Prior to Visit   Medication Sig   • amLODIPine (NORVASC) 10 MG tablet Take 1 tablet by mouth Every Night.   • carvedilol (COREG) 6.25 MG tablet Take 1 tablet by mouth 2 (Two) Times a Day.   • cetirizine (zyrTEC) 5 MG tablet Take 1 tablet by mouth Daily.   • clopidogrel (PLAVIX) 75 MG tablet Take 1 tablet by mouth Daily.   • Evolocumab (REPATHA) solution prefilled syringe injection Inject 1 mL under the skin into the appropriate area as directed Every 14 (Fourteen) Days.   • ezetimibe (ZETIA) 10 MG tablet Take 1 tablet by mouth Daily.   • metFORMIN ER (GLUCOPHAGE-XR) 500 MG 24 hr tablet Take 1 tablet by mouth Daily With Breakfast.   • nitroglycerin (NITRODUR) 0.1 MG/HR patch Daily.   • nitroglycerin (NITRODUR) 0.2 MG/HR patch Place 1 patch on the skin as directed by provider Daily.   • nitroglycerin (NITROSTAT) 0.4 MG SL tablet 1 under the tongue as needed for angina, may repeat q5mins for up three doses   • pantoprazole (PROTONIX) 40 MG pack packet Take 1 packet by mouth Daily.   • traZODone (DESYREL) 50 MG tablet Every Night.     No current facility-administered medications on file prior to visit.       Objective   Vitals:    04/25/23 1106 04/25/23 1110   BP: 161/62 148/69   Pulse: 67 60   Weight: 58.1 kg (128 lb)    Height: 149.9 cm (59\")        Physical Exam  Constitutional:       General: She is awake. She is not in acute distress.     Appearance: Normal appearance.   HENT:      Head: Normocephalic.      Nose: Nose normal. No congestion.   Eyes:      Extraocular Movements: Extraocular movements intact.      Conjunctiva/sclera: Conjunctivae normal.      Pupils: Pupils are equal, round, and reactive to light.   Neck:      Thyroid: No thyromegaly.      Vascular: No JVD.   Cardiovascular:      Rate and Rhythm: Normal rate and regular rhythm.      Chest Wall: PMI is not displaced.      Pulses: Normal pulses.      Heart sounds: " Normal heart sounds, S1 normal and S2 normal. No murmur heard.    No friction rub. No gallop. No S3 or S4 sounds.   Pulmonary:      Effort: Pulmonary effort is normal.      Breath sounds: Normal breath sounds. No wheezing, rhonchi or rales.   Abdominal:      General: Bowel sounds are normal.      Palpations: Abdomen is soft.      Tenderness: There is no abdominal tenderness.   Musculoskeletal:      Cervical back: No tenderness.      Right lower leg: No edema.      Left lower leg: Edema present.   Lymphadenopathy:      Cervical: No cervical adenopathy.   Skin:     General: Skin is warm and dry.      Capillary Refill: Capillary refill takes less than 2 seconds.      Coloration: Skin is not cyanotic.      Findings: No petechiae or rash.      Nails: There is no clubbing.   Neurological:      Mental Status: She is alert.   Psychiatric:         Mood and Affect: Mood normal.         Behavior: Behavior is cooperative.           Result Review     The following data was reviewed by ILENE Serrano on 05/01/23.    No results found for: PROBNP           Results for orders placed during the hospital encounter of 04/20/23    Adult Transthoracic Echo Complete w/ Color, Spectral and Contrast if necessary per protocol    Interpretation Summary  Fibrocalcific mitral and aortic valves.  Concentric left ventricular hypertrophy with normal left ventricular systolic function ejection fraction around 70%  Trace MR.  Mild TR.  Mild mitral valve prolapse.    Results for orders placed during the hospital encounter of 04/20/23    Stress Test With Myocardial Perfusion One Day    Interpretation Summary  •  Left ventricular ejection fraction is hyperdynamic (Calculated EF > 70%).  •  Stress electrocardiogram showed no ischemia.  •  Myocardial perfusion images show a small reversible apical defect.  •  Feel this represents a mildly abnormal stress test with the very small area of possible apical ischemia.  Would consider a trial of  antianginal's and if not able to to make her asymptomatic then could consider left heart cath.           Assessment & Plan  Diagnoses and all orders for this visit:    1. Coronary artery disease involving native coronary artery of native heart with other form of angina pectoris (Primary)    2. Essential hypertension    3. Hyperlipidemia LDL goal <70    4. Cigarette nicotine dependence without complication    5. Hx of CABG    6. Lower extremity edema  -     Duplex Venous Lower Extremity - Right CAR; Future          1.  She continues to have shoulder pain that radiates to her chest despite being on antianginals. It is mostly exertional.  I am concerned that this may be her anginal equivalent as she had similar pain that was found to be cardiac in nature at the time of her back bypass back in 2008.  Recent stress testing was mildly abnormal with a very small area of possible apical ischemia.  Plan to proceed with left heart catheterization for further evaluation and possible PCI if needed.  Risks and benefits were discussed at length with the patient.  She was agreeable to proceed.  She will be scheduled for May 3 with Dr. Nichols.  2.  Blood pressure is mildly elevated in the office today but she reports normal blood pressure readings at home.  Continue current antihypertensive regimen.  Continue home BP monitoring.  Notify us if her blood pressure remains above 1 40-1 50 systolic.  3.  Unknown control but intolerant to statin therapy.  Plan to check a lipid panel at next follow-up visit.  4.  Complete cessation was advised.  5.  With what appears to be her anginal equivalent.  Plan for left heart cath as above.  6.  For her right lower extremity swelling We will get an ultrasound to rule out DVT.      Follow Up   Return for After testing complete.    Patient was given instructions and counseling regarding her condition or for health maintenance advice. Please see specific information pulled into the AVS if  appropriate.     Mackenzie Melara, APRN  05/01/23  11:26 EDT    Dictated Utilizing Dragon Dictation

## 2023-04-25 NOTE — H&P (VIEW-ONLY)
Chief Complaint  Hyperlipidemia, Hypertension, Coronary Artery Disease, Other forms of angina pectoris, Atrial Fibrillation, and hx of CABG    Subjective        History of Present Illness  Thi Lynn presents to [facility]ARDIOLOG[facility] for follow up. Past medical history is outlined below, remarkable for coronary artery disease status post previous bypass surgery and MI in 2008, nicotine dependence, hypertension, hyperlipidemia, COPD.  She continues to have shoulder pain that radiates to her chest and feels like a tightening.  She notices it more when she exerts herself.  It is associated with dyspnea.  She states it is been the same for the last several months.  She denies any orthopnea, edema, syncope.    Past Medical History:   Diagnosis Date   • Arthritis    • Chest pain    • COPD (chronic obstructive pulmonary disease)    • Heart disease    • Hyperlipemia    • Hypertension    • Limb swelling    • Myocardial infarction    • Seasonal allergies    • Stomach disorder        ALLERGY  Allergies   Allergen Reactions   • Morphine Unknown - High Severity   • Oxycodone-Acetaminophen Unknown - High Severity   • Statins Myalgia   • Sulfa Antibiotics Unknown - High Severity        Past Surgical History:   Procedure Laterality Date   • ARTERIAL BYPASS SURGERY     • CARDIAC SURGERY     • CATARACT EXTRACTION      Cataract Surgery   • CHOLECYSTECTOMY     • COLONOSCOPY  2019   • ENDOSCOPY      + 10 years Franco Merrill in [place]   • HYSTERECTOMY          Social History     Socioeconomic History   • Marital status:    Tobacco Use   • Smoking status: Every Day     Packs/day: 0.25     Types: Cigarettes   • Smokeless tobacco: Never   Vaping Use   • Vaping Use: Never used   Substance and Sexual Activity   • Alcohol use: Never   • Drug use: Never   • Sexual activity: Defer       Family History   Problem Relation Age of Onset   • Heart disease Father    • Cancer Father         Unspecified   • Heart  "disease Brother    • Colon polyps Mother         Current Outpatient Medications on File Prior to Visit   Medication Sig   • amLODIPine (NORVASC) 10 MG tablet Take 1 tablet by mouth Every Night.   • carvedilol (COREG) 6.25 MG tablet Take 1 tablet by mouth 2 (Two) Times a Day.   • cetirizine (zyrTEC) 5 MG tablet Take 1 tablet by mouth Daily.   • clopidogrel (PLAVIX) 75 MG tablet Take 1 tablet by mouth Daily.   • Evolocumab (REPATHA) solution prefilled syringe injection Inject 1 mL under the skin into the appropriate area as directed Every 14 (Fourteen) Days.   • ezetimibe (ZETIA) 10 MG tablet Take 1 tablet by mouth Daily.   • metFORMIN ER (GLUCOPHAGE-XR) 500 MG 24 hr tablet Take 1 tablet by mouth Daily With Breakfast.   • nitroglycerin (NITRODUR) 0.1 MG/HR patch Daily.   • nitroglycerin (NITRODUR) 0.2 MG/HR patch Place 1 patch on the skin as directed by provider Daily.   • nitroglycerin (NITROSTAT) 0.4 MG SL tablet 1 under the tongue as needed for angina, may repeat q5mins for up three doses   • pantoprazole (PROTONIX) 40 MG pack packet Take 1 packet by mouth Daily.   • traZODone (DESYREL) 50 MG tablet Every Night.     No current facility-administered medications on file prior to visit.       Objective   Vitals:    04/25/23 1106 04/25/23 1110   BP: 161/62 148/69   Pulse: 67 60   Weight: 58.1 kg (128 lb)    Height: 149.9 cm (59\")        Physical Exam  Constitutional:       General: She is awake. She is not in acute distress.     Appearance: Normal appearance.   HENT:      Head: Normocephalic.      Nose: Nose normal. No congestion.   Eyes:      Extraocular Movements: Extraocular movements intact.      Conjunctiva/sclera: Conjunctivae normal.      Pupils: Pupils are equal, round, and reactive to light.   Neck:      Thyroid: No thyromegaly.      Vascular: No JVD.   Cardiovascular:      Rate and Rhythm: Normal rate and regular rhythm.      Chest Wall: PMI is not displaced.      Pulses: Normal pulses.      Heart sounds: " Normal heart sounds, S1 normal and S2 normal. No murmur heard.    No friction rub. No gallop. No S3 or S4 sounds.   Pulmonary:      Effort: Pulmonary effort is normal.      Breath sounds: Normal breath sounds. No wheezing, rhonchi or rales.   Abdominal:      General: Bowel sounds are normal.      Palpations: Abdomen is soft.      Tenderness: There is no abdominal tenderness.   Musculoskeletal:      Cervical back: No tenderness.      Right lower leg: No edema.      Left lower leg: Edema present.   Lymphadenopathy:      Cervical: No cervical adenopathy.   Skin:     General: Skin is warm and dry.      Capillary Refill: Capillary refill takes less than 2 seconds.      Coloration: Skin is not cyanotic.      Findings: No petechiae or rash.      Nails: There is no clubbing.   Neurological:      Mental Status: She is alert.   Psychiatric:         Mood and Affect: Mood normal.         Behavior: Behavior is cooperative.           Result Review     The following data was reviewed by ILENE Serrano on 05/01/23.    No results found for: PROBNP           Results for orders placed during the hospital encounter of 04/20/23    Adult Transthoracic Echo Complete w/ Color, Spectral and Contrast if necessary per protocol    Interpretation Summary  Fibrocalcific mitral and aortic valves.  Concentric left ventricular hypertrophy with normal left ventricular systolic function ejection fraction around 70%  Trace MR.  Mild TR.  Mild mitral valve prolapse.    Results for orders placed during the hospital encounter of 04/20/23    Stress Test With Myocardial Perfusion One Day    Interpretation Summary  •  Left ventricular ejection fraction is hyperdynamic (Calculated EF > 70%).  •  Stress electrocardiogram showed no ischemia.  •  Myocardial perfusion images show a small reversible apical defect.  •  Feel this represents a mildly abnormal stress test with the very small area of possible apical ischemia.  Would consider a trial of  antianginal's and if not able to to make her asymptomatic then could consider left heart cath.           Assessment & Plan  Diagnoses and all orders for this visit:    1. Coronary artery disease involving native coronary artery of native heart with other form of angina pectoris (Primary)    2. Essential hypertension    3. Hyperlipidemia LDL goal <70    4. Cigarette nicotine dependence without complication    5. Hx of CABG    6. Lower extremity edema  -     Duplex Venous Lower Extremity - Right CAR; Future          1.  She continues to have shoulder pain that radiates to her chest despite being on antianginals. It is mostly exertional.  I am concerned that this may be her anginal equivalent as she had similar pain that was found to be cardiac in nature at the time of her back bypass back in 2008.  Recent stress testing was mildly abnormal with a very small area of possible apical ischemia.  Plan to proceed with left heart catheterization for further evaluation and possible PCI if needed.  Risks and benefits were discussed at length with the patient.  She was agreeable to proceed.  She will be scheduled for May 3 with Dr. Nichols.  2.  Blood pressure is mildly elevated in the office today but she reports normal blood pressure readings at home.  Continue current antihypertensive regimen.  Continue home BP monitoring.  Notify us if her blood pressure remains above 1 40-1 50 systolic.  3.  Unknown control but intolerant to statin therapy.  Plan to check a lipid panel at next follow-up visit.  4.  Complete cessation was advised.  5.  With what appears to be her anginal equivalent.  Plan for left heart cath as above.  6.  For her right lower extremity swelling We will get an ultrasound to rule out DVT.      Follow Up   Return for After testing complete.    Patient was given instructions and counseling regarding her condition or for health maintenance advice. Please see specific information pulled into the AVS if  appropriate.     Mackenzie Melara, APRN  05/01/23  11:26 EDT    Dictated Utilizing Dragon Dictation

## 2023-05-01 ENCOUNTER — PREP FOR SURGERY (OUTPATIENT)
Dept: OTHER | Facility: HOSPITAL | Age: 73
End: 2023-05-01
Payer: MEDICARE

## 2023-05-01 ENCOUNTER — TELEPHONE (OUTPATIENT)
Dept: CARDIOLOGY | Facility: CLINIC | Age: 73
End: 2023-05-01
Payer: MEDICARE

## 2023-05-01 DIAGNOSIS — I25.118 CORONARY ARTERY DISEASE INVOLVING NATIVE CORONARY ARTERY OF NATIVE HEART WITH OTHER FORM OF ANGINA PECTORIS: Primary | ICD-10-CM

## 2023-05-01 RX ORDER — SODIUM CHLORIDE 9 MG/ML
40 INJECTION, SOLUTION INTRAVENOUS AS NEEDED
Status: CANCELLED | OUTPATIENT
Start: 2023-05-01

## 2023-05-01 RX ORDER — SODIUM CHLORIDE 0.9 % (FLUSH) 0.9 %
10 SYRINGE (ML) INJECTION AS NEEDED
Status: CANCELLED | OUTPATIENT
Start: 2023-05-01

## 2023-05-01 RX ORDER — SODIUM CHLORIDE 0.9 % (FLUSH) 0.9 %
10 SYRINGE (ML) INJECTION EVERY 12 HOURS SCHEDULED
Status: CANCELLED | OUTPATIENT
Start: 2023-05-01

## 2023-05-01 RX ORDER — LORATADINE 10 MG/1
10 TABLET ORAL DAILY
COMMUNITY

## 2023-05-01 NOTE — PRE-PROCEDURE INSTRUCTIONS
PATIENT INSTRUCTED TO BE:    - NPO AFTER MIDNIGHT EXCEPT CAN HAVE SIPS OF WATER WITH HIS MEDICATION PRIOR TO PROCEDURE    -  INSTRUCTED NO LOTIONS, JEWELRY, PIERCINGS, OR DEODORANT DAY OF THE PROCEDURE    - INSTRUCTED TO TAKE THE FOLLOWING MEDICATIONS THE DAY OF SURGERY: COREG, PLAVIX, ZETIA, CLARITIN, NITRO PATCH , PANTOPRAZOLE AM OF SURGERY          - INSTRUCTED PT TO FOLLOW ANY INSTRUCTIONS GIVEN BY HIS CARDIOLOGIST.    - INFORMED PT THEY ATTEMPT RADIAL APPROACH FIRST IF UNABLE TO PERFORM CATH WITH THAT APPROACH THEY WILL DO A FEMORAL APPROACH.  ALSO, INFORMED PT THEY WILL BE ON BEDREST POSTOP.  IF THE SURGEON FEELS  AN INTERVENTION OR STENTS NEEDS TO BE PLACED, HE/SHE WILL STAY OVER NIGHT FOR OBSERVATION AND MONITORING.     - INFORMED THE PATIENT HE CAN HAVE TWO VISITORS WITH HIM THE DAY OF THE PROCEDURE    - INSTRUCTED PT TO PARK IN THE PARKING GARAGE, ENTER THE HOSPITAL THRU ENTRANCE A AND  CHECK IN AT THE MAIN REGISTRATION DESK, AFTER REGISTRATION PT WILL BE TAKEN THE THE PREOP AREA FOR HIS PROCEDURE.    -ARRIVAL TIME GIVEN BY CARDIOLOGIST OFFICE, INFORMED PT IF ARRIVAL TIME CHANGES OR ADJUSTMENTS NEED TO BE MADE IN THEIR ARRIVAL TIME, HE/SHE WOULD RECEIVE A CALL.    -INSTRUCTED PT TO COME TO [facility] TO GET THEIR LABS/ EKG DONE PRIOR TO PROCEDURE      - PATIENT VERBALIZED UNDERSTANDING

## 2023-05-01 NOTE — TELEPHONE ENCOUNTER
Orders have been placed. The hospital should be calling her to schedule preop testing and provide pre op instructions.

## 2023-05-01 NOTE — TELEPHONE ENCOUNTER
Caller: Thi Lynn    Relationship: Self    Best call back number: [phone]    What is the best time to reach you: ANYTIME    What was the call regarding: PIPPA STATES WAS TOLD BY [facility] THAT SHE WAS SUPPOSED TO HAVE A HEART CATH ON THE 3RD AND POSSIBLY A STENT. SHE HAS NOT RECEIVED ANY INFORMATION ON IT AND SHE SAID SHE CALLED THE HOSPITAL AND THEY DIDN'T HAVE ANYTHING ON IT. SHE IS WONDERING IF SHE IS SUPPOSED TO BE GETTING IT DONE OR NOT BECAUSE SHE HAS TO ARRANGE TRANSPORTATION AND SHE NEEDS PATIENT INSTRUCTIONS FOR THE PROCEDURES    Do you require a callback: YES

## 2023-05-03 ENCOUNTER — HOSPITAL ENCOUNTER (OUTPATIENT)
Facility: HOSPITAL | Age: 73
Setting detail: HOSPITAL OUTPATIENT SURGERY
Discharge: HOME | End: 2023-05-03
Attending: INTERNAL MEDICINE | Admitting: INTERNAL MEDICINE
Payer: MEDICARE

## 2023-05-03 VITALS
RESPIRATION RATE: 18 BRPM | WEIGHT: 128 LBS | OXYGEN SATURATION: 100 % | SYSTOLIC BLOOD PRESSURE: 134 MMHG | BODY MASS INDEX: 25.8 KG/M2 | HEIGHT: 59 IN | DIASTOLIC BLOOD PRESSURE: 67 MMHG | HEART RATE: 62 BPM | TEMPERATURE: 98 F

## 2023-05-03 DIAGNOSIS — I25.118 CORONARY ARTERY DISEASE INVOLVING NATIVE CORONARY ARTERY OF NATIVE HEART WITH OTHER FORM OF ANGINA PECTORIS: ICD-10-CM

## 2023-05-03 LAB
ANION GAP SERPL CALCULATED.3IONS-SCNC: 8.6 MMOL/L (ref 5–15)
BUN SERPL-MCNC: 10 MG/DL (ref 8–23)
BUN/CREAT SERPL: 17.5 (ref 7–25)
CALCIUM SPEC-SCNC: 8.8 MG/DL (ref 8.6–10.5)
CHLORIDE SERPL-SCNC: 105 MMOL/L (ref 98–107)
CO2 SERPL-SCNC: 25.4 MMOL/L (ref 22–29)
CREAT SERPL-MCNC: 0.57 MG/DL (ref 0.57–1)
DEPRECATED RDW RBC AUTO: 39.4 FL (ref 37–54)
EGFRCR SERPLBLD CKD-EPI 2021: 96.7 ML/MIN/1.73
ERYTHROCYTE [DISTWIDTH] IN BLOOD BY AUTOMATED COUNT: 12.8 % (ref 12.3–15.4)
GLUCOSE SERPL-MCNC: 110 MG/DL (ref 65–99)
HCT VFR BLD AUTO: 36.3 % (ref 34–46.6)
HGB BLD-MCNC: 12.7 G/DL (ref 12–15.9)
INR PPP: 0.99 (ref 0.86–1.15)
MCH RBC QN AUTO: 29.5 PG (ref 26.6–33)
MCHC RBC AUTO-ENTMCNC: 35 G/DL (ref 31.5–35.7)
MCV RBC AUTO: 84.2 FL (ref 79–97)
PLATELET # BLD AUTO: 348 10*3/MM3 (ref 140–450)
PMV BLD AUTO: 9 FL (ref 6–12)
POTASSIUM SERPL-SCNC: 3.9 MMOL/L (ref 3.5–5.2)
PROTHROMBIN TIME: 13.2 SECONDS (ref 11.8–14.9)
RBC # BLD AUTO: 4.31 10*6/MM3 (ref 3.77–5.28)
SODIUM SERPL-SCNC: 139 MMOL/L (ref 136–145)
WBC NRBC COR # BLD: 10.4 10*3/MM3 (ref 3.4–10.8)

## 2023-05-03 PROCEDURE — C1894 INTRO/SHEATH, NON-LASER: HCPCS | Performed by: INTERNAL MEDICINE

## 2023-05-03 PROCEDURE — 85610 PROTHROMBIN TIME: CPT

## 2023-05-03 PROCEDURE — 25510000001 IOPAMIDOL PER 1 ML: Performed by: INTERNAL MEDICINE

## 2023-05-03 PROCEDURE — 25010000002 FENTANYL CITRATE (PF) 50 MCG/ML SOLUTION: Performed by: INTERNAL MEDICINE

## 2023-05-03 PROCEDURE — C1769 GUIDE WIRE: HCPCS | Performed by: INTERNAL MEDICINE

## 2023-05-03 PROCEDURE — 80048 BASIC METABOLIC PNL TOTAL CA: CPT

## 2023-05-03 PROCEDURE — 85027 COMPLETE CBC AUTOMATED: CPT

## 2023-05-03 PROCEDURE — C1760 CLOSURE DEV, VASC: HCPCS | Performed by: INTERNAL MEDICINE

## 2023-05-03 PROCEDURE — 93459 L HRT ART/GRFT ANGIO: CPT | Performed by: INTERNAL MEDICINE

## 2023-05-03 PROCEDURE — 25010000002 MIDAZOLAM PER 1 MG: Performed by: INTERNAL MEDICINE

## 2023-05-03 RX ORDER — SODIUM CHLORIDE 9 MG/ML
40 INJECTION, SOLUTION INTRAVENOUS AS NEEDED
Status: DISCONTINUED | OUTPATIENT
Start: 2023-05-03 | End: 2023-05-03 | Stop reason: HOSPADM

## 2023-05-03 RX ORDER — ONDANSETRON 2 MG/ML
4 INJECTION INTRAMUSCULAR; INTRAVENOUS EVERY 6 HOURS PRN
Status: CANCELLED | OUTPATIENT
Start: 2023-05-03

## 2023-05-03 RX ORDER — ACETAMINOPHEN 325 MG/1
650 TABLET ORAL EVERY 4 HOURS PRN
Status: CANCELLED | OUTPATIENT
Start: 2023-05-03

## 2023-05-03 RX ORDER — SODIUM CHLORIDE 0.9 % (FLUSH) 0.9 %
10 SYRINGE (ML) INJECTION AS NEEDED
Status: DISCONTINUED | OUTPATIENT
Start: 2023-05-03 | End: 2023-05-03 | Stop reason: HOSPADM

## 2023-05-03 RX ORDER — MIDAZOLAM HYDROCHLORIDE 1 MG/ML
INJECTION INTRAMUSCULAR; INTRAVENOUS
Status: DISCONTINUED | OUTPATIENT
Start: 2023-05-03 | End: 2023-05-03 | Stop reason: HOSPADM

## 2023-05-03 RX ORDER — FENTANYL CITRATE 50 UG/ML
INJECTION, SOLUTION INTRAMUSCULAR; INTRAVENOUS
Status: DISCONTINUED | OUTPATIENT
Start: 2023-05-03 | End: 2023-05-03 | Stop reason: HOSPADM

## 2023-05-03 RX ORDER — SODIUM CHLORIDE 9 MG/ML
100 INJECTION, SOLUTION INTRAVENOUS CONTINUOUS
Status: DISCONTINUED | OUTPATIENT
Start: 2023-05-03 | End: 2023-05-03 | Stop reason: HOSPADM

## 2023-05-03 RX ORDER — LIDOCAINE HYDROCHLORIDE 20 MG/ML
INJECTION, SOLUTION INFILTRATION; PERINEURAL
Status: DISCONTINUED | OUTPATIENT
Start: 2023-05-03 | End: 2023-05-03 | Stop reason: HOSPADM

## 2023-05-03 RX ORDER — ONDANSETRON 4 MG/1
4 TABLET, FILM COATED ORAL EVERY 6 HOURS PRN
Status: CANCELLED | OUTPATIENT
Start: 2023-05-03

## 2023-05-03 RX ORDER — SODIUM CHLORIDE 0.9 % (FLUSH) 0.9 %
10 SYRINGE (ML) INJECTION EVERY 12 HOURS SCHEDULED
Status: DISCONTINUED | OUTPATIENT
Start: 2023-05-03 | End: 2023-05-03 | Stop reason: HOSPADM

## 2023-05-03 NOTE — Clinical Note
A 6 fr sheath was  inserted into the right femoral artery. Sheath insertion not delayed. Discharge instructions were given to the patient by Teofilo Yin.     The patient left the Emergency Department ambulatory, alert and oriented and in no acute distress with 4 prescriptions. The patient was encouraged to call or return to the ED for worsening issues or problems and was encouraged to schedule a follow up appointment for continuing care. The patient verbalized understanding of discharge instructions and prescriptions, all questions were answered. The patient has no further concerns at this time.

## 2023-05-03 NOTE — INTERVAL H&P NOTE
H&P reviewed. The patient was examined and there are no changes to the H&P.  No change in physical exam.  Treated with multiple antianginals but still continues to have symptoms.

## 2023-05-03 NOTE — Clinical Note
The right femoral pulse is +2. stroke code was called at 7:39 pm. On initial evaluation, patient was following commands, verbalizing and participating in a communication, no facial asymmetry noted and moving all four extremity when asked. Patient's blood pressure was severely low as 43/22. RRT team was called.   Patient did not respond to the fluids and was started on pressure agents. No focal neurology deficit noted prior to arrival of RRT team.   Stroke code was cancelled. Primary team was notified to get CT head once patient is stable enough to move to CT scan.     Patient was not TPA candidate due to elevated INR.     Plan     CT head when patient is stable stroke code was called at 7:39 pm. On initial evaluation, patient was following commands, verbalizing and participating in a communication, no facial asymmetry noted and moving all four extremity when asked. Patient's blood pressure was severely low as 43/22. RRT team was called.   Patient did not respond to the fluids and was started on pressure agents. No focal neurology deficit noted prior to arrival of RRT team.   Stroke code was cancelled. Primary team was notified to get CT head once patient is stable enough to move to CT scan.     Patient was not TPA candidate due to elevated INR. Full neurological examination was not done as patient was severely ill and was managed by primary team. His NIHSS was not accurately calculated.     Plan     CT head when patient is stable

## 2023-05-31 ENCOUNTER — HOSPITAL ENCOUNTER (OUTPATIENT)
Dept: CARDIOLOGY | Facility: HOSPITAL | Age: 73
Discharge: HOME | End: 2023-05-31

## 2023-05-31 DIAGNOSIS — R60.0 LOWER EXTREMITY EDEMA: ICD-10-CM

## 2023-05-31 LAB
BH CV LOWER VASCULAR LEFT COMMON FEMORAL AUGMENT: NORMAL
BH CV LOWER VASCULAR LEFT COMMON FEMORAL COMPETENT: NORMAL
BH CV LOWER VASCULAR LEFT COMMON FEMORAL COMPRESS: NORMAL
BH CV LOWER VASCULAR LEFT COMMON FEMORAL PHASIC: NORMAL
BH CV LOWER VASCULAR LEFT COMMON FEMORAL SPONT: NORMAL
BH CV LOWER VASCULAR RIGHT COMMON FEMORAL AUGMENT: NORMAL
BH CV LOWER VASCULAR RIGHT COMMON FEMORAL COMPETENT: NORMAL
BH CV LOWER VASCULAR RIGHT COMMON FEMORAL COMPRESS: NORMAL
BH CV LOWER VASCULAR RIGHT COMMON FEMORAL PHASIC: NORMAL
BH CV LOWER VASCULAR RIGHT COMMON FEMORAL SPONT: NORMAL
BH CV LOWER VASCULAR RIGHT DISTAL FEMORAL COMPRESS: NORMAL
BH CV LOWER VASCULAR RIGHT GASTRONEMIUS COMPRESS: NORMAL
BH CV LOWER VASCULAR RIGHT GREATER SAPH AK COMPRESS: NORMAL
BH CV LOWER VASCULAR RIGHT GREATER SAPH BK COMPRESS: NORMAL
BH CV LOWER VASCULAR RIGHT LESSER SAPH COMPRESS: NORMAL
BH CV LOWER VASCULAR RIGHT MID FEMORAL AUGMENT: NORMAL
BH CV LOWER VASCULAR RIGHT MID FEMORAL COMPETENT: NORMAL
BH CV LOWER VASCULAR RIGHT MID FEMORAL COMPRESS: NORMAL
BH CV LOWER VASCULAR RIGHT MID FEMORAL PHASIC: NORMAL
BH CV LOWER VASCULAR RIGHT MID FEMORAL SPONT: NORMAL
BH CV LOWER VASCULAR RIGHT PERONEAL COMPRESS: NORMAL
BH CV LOWER VASCULAR RIGHT POPLITEAL AUGMENT: NORMAL
BH CV LOWER VASCULAR RIGHT POPLITEAL COMPETENT: NORMAL
BH CV LOWER VASCULAR RIGHT POPLITEAL COMPRESS: NORMAL
BH CV LOWER VASCULAR RIGHT POPLITEAL PHASIC: NORMAL
BH CV LOWER VASCULAR RIGHT POPLITEAL SPONT: NORMAL
BH CV LOWER VASCULAR RIGHT POSTERIOR TIBIAL COMPRESS: NORMAL
BH CV LOWER VASCULAR RIGHT PROXIMAL FEMORAL COMPRESS: NORMAL
MAXIMAL PREDICTED HEART RATE: 148 BPM
STRESS TARGET HR: 126 BPM

## 2023-05-31 PROCEDURE — 93971 EXTREMITY STUDY: CPT

## 2023-06-01 ENCOUNTER — TELEPHONE (OUTPATIENT)
Dept: CARDIOLOGY | Facility: CLINIC | Age: 73
End: 2023-06-01

## 2023-06-01 NOTE — TELEPHONE ENCOUNTER
----- Message from ILENE Shaver sent at 6/1/2023 12:02 PM EDT -----  Notify patient the results of her venous ultrasound were unremarkable.

## 2023-09-06 ENCOUNTER — OFFICE VISIT (OUTPATIENT)
Dept: CARDIOLOGY | Facility: CLINIC | Age: 73
End: 2023-09-06
Payer: MEDICARE

## 2023-09-06 VITALS
SYSTOLIC BLOOD PRESSURE: 149 MMHG | WEIGHT: 122 LBS | DIASTOLIC BLOOD PRESSURE: 56 MMHG | HEIGHT: 59 IN | HEART RATE: 96 BPM | BODY MASS INDEX: 24.6 KG/M2

## 2023-09-06 DIAGNOSIS — I10 ESSENTIAL HYPERTENSION: ICD-10-CM

## 2023-09-06 DIAGNOSIS — Z95.1 HX OF CABG: ICD-10-CM

## 2023-09-06 DIAGNOSIS — F17.210 CIGARETTE NICOTINE DEPENDENCE WITHOUT COMPLICATION: ICD-10-CM

## 2023-09-06 DIAGNOSIS — E78.5 HYPERLIPIDEMIA LDL GOAL <70: ICD-10-CM

## 2023-09-06 DIAGNOSIS — I25.10 CORONARY ARTERY DISEASE INVOLVING NATIVE CORONARY ARTERY OF NATIVE HEART WITHOUT ANGINA PECTORIS: Primary | ICD-10-CM

## 2023-09-06 PROCEDURE — 1159F MED LIST DOCD IN RCRD: CPT

## 2023-09-06 PROCEDURE — 1160F RVW MEDS BY RX/DR IN RCRD: CPT

## 2023-09-06 PROCEDURE — 3077F SYST BP >= 140 MM HG: CPT

## 2023-09-06 PROCEDURE — 3078F DIAST BP <80 MM HG: CPT

## 2023-09-06 PROCEDURE — 99214 OFFICE O/P EST MOD 30 MIN: CPT

## 2023-09-06 RX ORDER — CLOPIDOGREL BISULFATE 75 MG/1
75 TABLET ORAL DAILY
Qty: 90 TABLET | Refills: 3 | Status: SHIPPED | OUTPATIENT
Start: 2023-09-06

## 2023-09-06 RX ORDER — MONTELUKAST SODIUM 10 MG/1
10 TABLET ORAL NIGHTLY
COMMUNITY
Start: 2023-08-30

## 2023-09-06 RX ORDER — HYDROCODONE BITARTRATE AND ACETAMINOPHEN 10; 325 MG/1; MG/1
1 TABLET ORAL EVERY 4 HOURS PRN
COMMUNITY
Start: 2023-08-26

## 2023-09-06 RX ORDER — CARVEDILOL 6.25 MG/1
6.25 TABLET ORAL 2 TIMES DAILY
Qty: 180 TABLET | Refills: 3 | Status: SHIPPED | OUTPATIENT
Start: 2023-09-06

## 2023-09-06 NOTE — PROGRESS NOTES
Chief Complaint  Coronary artery disease involving native coronary artery of, Hypertension, Hyperlipidemia, and Follow-up    Subjective        History of Present Illness  Thi Lynn presents to [facility] CARDIOLOGY for follow up. Past medical history is outlined below, remarkable for coronary artery disease status post previous bypass surgery and MI in 2008, nicotine dependence, hypertension, hyperlipidemia, COPD.   She continues to complain of shoulder pain but denies any chest pain or discomfort.  She has no shortness of breath, edema, dizziness, lightheadedness, syncope or presyncope.  Overall she is doing very well from a cardiac standpoint.      Past Medical History:   Diagnosis Date    Arthritis     Chest pain     COPD (chronic obstructive pulmonary disease)     Heart disease     Hyperlipemia     Hypertension     Limb swelling     Myocardial infarction     Seasonal allergies     Stomach disorder     Stroke        ALLERGY  Allergies   Allergen Reactions    Morphine Unknown - High Severity    Oxycodone-Acetaminophen Unknown - High Severity    Statins Myalgia    Sulfa Antibiotics Unknown - High Severity        Past Surgical History:   Procedure Laterality Date    CARDIAC CATHETERIZATION N/A 5/3/2023    Procedure: Left Heart Cath with possible coronary angioplasty;  Surgeon: Jason Nichols MD;  Location: [facility] CATH INVASIVE LOCATION;  Service: Cardiology;  Laterality: N/A;    CATARACT EXTRACTION      Cataract Surgery    CHOLECYSTECTOMY      COLONOSCOPY  2019    CORONARY ARTERY BYPASS GRAFT  2009    3v    ENDOSCOPY      + 10 years Franco Merrill in [place]    HYSTERECTOMY          Social History     Socioeconomic History    Marital status:    Tobacco Use    Smoking status: Every Day     Packs/day: 0.25     Types: Cigarettes    Smokeless tobacco: Never   Vaping Use    Vaping Use: Never used   Substance and Sexual Activity    Alcohol use: Never    Drug use: Never    Sexual  "activity: Defer       Family History   Problem Relation Age of Onset    Heart disease Father     Cancer Father         Unspecified    Heart disease Brother     Colon polyps Mother         Current Outpatient Medications on File Prior to Visit   Medication Sig    amLODIPine (NORVASC) 10 MG tablet Take 1 tablet by mouth Every Night.    Evolocumab (REPATHA) solution prefilled syringe injection Inject 1 mL under the skin into the appropriate area as directed Every 14 (Fourteen) Days.    ezetimibe (ZETIA) 10 MG tablet Take 1 tablet by mouth Daily.    HYDROcodone-acetaminophen (NORCO)  MG per tablet Take 1 tablet by mouth Every 4 (Four) Hours As Needed.    metFORMIN ER (GLUCOPHAGE-XR) 500 MG 24 hr tablet Take 1 tablet by mouth Daily With Breakfast.    montelukast (SINGULAIR) 10 MG tablet Take 1 tablet by mouth Every Night.    nitroglycerin (NITRODUR) 0.2 MG/HR patch Place 1 patch on the skin as directed by provider Daily.    nitroglycerin (NITROSTAT) 0.4 MG SL tablet 1 under the tongue as needed for angina, may repeat q5mins for up three doses    pantoprazole (PROTONIX) 40 MG EC tablet Take 1 tablet by mouth Daily.    [DISCONTINUED] carvedilol (COREG) 6.25 MG tablet Take 1 tablet by mouth 2 (Two) Times a Day.    [DISCONTINUED] clopidogrel (PLAVIX) 75 MG tablet Take 1 tablet by mouth Daily.    [DISCONTINUED] loratadine (CLARITIN) 10 MG tablet Take 1 tablet by mouth Daily. (Patient not taking: Reported on 9/6/2023)    [DISCONTINUED] traZODone (DESYREL) 100 MG tablet Take 1 tablet by mouth Every Night. (Patient not taking: Reported on 9/6/2023)     No current facility-administered medications on file prior to visit.       Objective   Vitals:    09/06/23 1055 09/06/23 1103   BP: 147/62 149/56   Pulse: 96 96   Weight: 55.3 kg (122 lb)    Height: 149.9 cm (59\")        Physical Exam  Constitutional:       General: She is awake. She is not in acute distress.     Appearance: Normal appearance.   HENT:      Head: " Normocephalic.      Nose: Nose normal. No congestion.   Eyes:      Extraocular Movements: Extraocular movements intact.      Conjunctiva/sclera: Conjunctivae normal.      Pupils: Pupils are equal, round, and reactive to light.   Neck:      Thyroid: No thyromegaly.      Vascular: No JVD.   Cardiovascular:      Rate and Rhythm: Normal rate and regular rhythm.      Chest Wall: PMI is not displaced.      Pulses: Normal pulses.      Heart sounds: Normal heart sounds, S1 normal and S2 normal. No murmur heard.    No friction rub. No gallop. No S3 or S4 sounds.   Pulmonary:      Effort: Pulmonary effort is normal.      Breath sounds: Normal breath sounds. No wheezing, rhonchi or rales.   Abdominal:      General: Bowel sounds are normal.      Palpations: Abdomen is soft.      Tenderness: There is no abdominal tenderness.   Musculoskeletal:      Cervical back: No tenderness.      Right lower leg: No edema.      Left lower leg: No edema.   Lymphadenopathy:      Cervical: No cervical adenopathy.   Skin:     General: Skin is warm and dry.      Capillary Refill: Capillary refill takes less than 2 seconds.      Coloration: Skin is not cyanotic.      Findings: No petechiae or rash.      Nails: There is no clubbing.   Neurological:      Mental Status: She is alert.   Psychiatric:         Mood and Affect: Mood normal.         Behavior: Behavior is cooperative.         Result Review     The following data was reviewed by ILENE Serrano on 09/06/23.    No results found for: PROBNP  CMP          5/3/2023    11:31   CMP   Glucose 110    BUN 10    Creatinine 0.57    EGFR 96.7    Sodium 139    Potassium 3.9    Chloride 105    Calcium 8.8    BUN/Creatinine Ratio 17.5    Anion Gap 8.6      CBC w/diff          5/3/2023    11:31   CBC w/Diff   WBC 10.40    RBC 4.31    Hemoglobin 12.7    Hematocrit 36.3    MCV 84.2    MCH 29.5    MCHC 35.0    RDW 12.8    Platelets 348           Results for orders placed during the hospital encounter  of 04/20/23    Adult Transthoracic Echo Complete w/ Color, Spectral and Contrast if necessary per protocol    Interpretation Summary  Fibrocalcific mitral and aortic valves.  Concentric left ventricular hypertrophy with normal left ventricular systolic function ejection fraction around 70%  Trace MR.  Mild TR.  Mild mitral valve prolapse.    Results for orders placed during the hospital encounter of 04/20/23    Stress Test With Myocardial Perfusion One Day    Interpretation Summary    Left ventricular ejection fraction is hyperdynamic (Calculated EF > 70%).    Stress electrocardiogram showed no ischemia.    Myocardial perfusion images show a small reversible apical defect.    Feel this represents a mildly abnormal stress test with the very small area of possible apical ischemia.  Would consider a trial of antianginal's and if not able to to make her asymptomatic then could consider left heart cath.         Procedures    Assessment & Plan  Diagnoses and all orders for this visit:    1. Coronary artery disease involving native coronary artery of native heart without angina pectoris (Primary)    2. Essential hypertension    3. Hyperlipidemia LDL goal <70    4. Cigarette nicotine dependence without complication    5. Hx of CABG    Other orders  -     carvedilol (COREG) 6.25 MG tablet; Take 1 tablet by mouth 2 (Two) Times a Day.  Dispense: 180 tablet; Refill: 3  -     clopidogrel (PLAVIX) 75 MG tablet; Take 1 tablet by mouth Daily.  Dispense: 90 tablet; Refill: 3    1.Taking medications as instructed without side effects.  No angina or anginal-like symptoms. Continue with current management.  2. Hypertension is well controlled on current doses of antihypertensive medication. Continue current medications Dietary sodium restriction. Check blood pressures daily and record.   3.Lipid abnormalities are controlled. Target LDL for this patient is <70. Explained to her the respective contributions of genetics, diet, and exercise  to lipid levels and encouraged healthy diet and routine aerobic exercise. Continue current medications.  4.  Encourage smoking cessation.      Follow Up   Return in about 6 months (around 3/6/2024) for With Dr. Nichols.    Patient was given instructions and counseling regarding her condition or for health maintenance advice. Please see specific information pulled into the AVS if appropriate.     Mackenzie Melara, ILENE  09/06/23  11:04 EDT    Dictated Utilizing Dragon Dictation

## 2023-11-15 ENCOUNTER — OFFICE VISIT (OUTPATIENT)
Dept: PULMONOLOGY | Facility: CLINIC | Age: 73
End: 2023-11-15
Payer: MEDICARE

## 2023-11-15 VITALS
SYSTOLIC BLOOD PRESSURE: 158 MMHG | DIASTOLIC BLOOD PRESSURE: 45 MMHG | BODY MASS INDEX: 24.6 KG/M2 | WEIGHT: 122 LBS | TEMPERATURE: 97.8 F | HEART RATE: 63 BPM | OXYGEN SATURATION: 97 % | RESPIRATION RATE: 16 BRPM | HEIGHT: 59 IN

## 2023-11-15 DIAGNOSIS — F17.210 NICOTINE DEPENDENCE, CIGARETTES, UNCOMPLICATED: ICD-10-CM

## 2023-11-15 DIAGNOSIS — R91.1 LUNG NODULE: Primary | ICD-10-CM

## 2023-11-15 DIAGNOSIS — Z23 ENCOUNTER FOR IMMUNIZATION: ICD-10-CM

## 2023-11-15 DIAGNOSIS — J43.9 PULMONARY EMPHYSEMA, UNSPECIFIED EMPHYSEMA TYPE: ICD-10-CM

## 2023-11-15 RX ORDER — FLUTICASONE PROPIONATE 50 MCG
SPRAY, SUSPENSION (ML) NASAL
COMMUNITY
Start: 2023-10-04

## 2023-11-15 NOTE — PROGRESS NOTES
"Primary Care Provider  Amanda Mike APRN     Referring Provider  ILENE Bolton     Chief Complaint  Cough (Clear phlegm, allergies ) and Establish Care (1.5 CM RML)    Subjective          Thi Lynn presents to [facility] PULMONARY & CRITICAL CARE MEDICINE  History of Present Illness  Thi Lynn is a 73 y.o. female patient here to establish care for a pulmonary nodule, emphysema and tobacco abuse of cigarettes ongoing.    Patient recently had a low-dose chest CT on 11/2/2023.  This shows mild emphysema and a 1.5 cm noncalcified nodule in the anterior right middle lobe.  Dr. Tamez has reviewed patient's chest CT and is recommending a PET scan for further evaluation.  This plan has been discussed with patient today in office.  He is agreeable to proceeding with a PET scan.  Patient is a current smoker and has greater than a 30-pack-year history.  She denies any significant shortness of breath at this time.  She does smoke approximately 0.25 packs of cigarettes daily and is working on cutting back.  As she is not currently on any respiratory medications but states that she did try Trelegy in the past and did not like this medication.  She does complain of intermittent nasal drainage which leads to a cough.  Patient states that her father did have cancer \"all over\" but she is unsure of any specific lung cancers or emphysema.  She is able to perform her ADLs without difficulty.  She is up-to-date with her pneumonia vaccine and would like to receive a flu vaccine today in office.     Her history of smoking is   Tobacco Use: High Risk (11/16/2023)    Patient History     Smoking Tobacco Use: Every Day     Smokeless Tobacco Use: Never     Passive Exposure: Current     Thi Lynn  reports that she has been smoking cigarettes. She started smoking about 52 years ago. She has a 13.00 pack-year smoking history. She has been exposed to tobacco smoke. She has never used smokeless " tobacco.. I have educated her on the risk of diseases from using tobacco products such as cancer, COPD, and heart disease.     I advised her to quit and she is willing to quit. We have discussed the following method/s for tobacco cessation:  Education Material Counseling [place].  Together we have set a quit date for 1 month from today.  She will follow up with me in  3   weeks  or sooner to check on her progress.    I spent 5 minutes counseling the patient.      Review of Systems   Constitutional:  Negative for chills, fatigue, fever, unexpected weight gain and unexpected weight loss.   HENT:  Positive for postnasal drip and rhinorrhea. Congestion: Nasal.   Respiratory:  Positive for cough. Negative for apnea, shortness of breath and wheezing.         Negative for Hemoptysis     Cardiovascular:  Negative for chest pain, palpitations and leg swelling.   Skin:         Negative for cyanosis      Sleep: Negative for Excessive daytime sleepiness  Negative for morning headaches  Negative for Snoring    Family History   Problem Relation Age of Onset    Heart disease Father     Cancer Father         Unspecified    Heart disease Brother     Colon polyps Mother         Social History     Socioeconomic History    Marital status:    Tobacco Use    Smoking status: Every Day     Packs/day: 0.25     Years: 52.00     Additional pack years: 0.00     Total pack years: 13.00     Types: Cigarettes     Start date: 1971     Passive exposure: Current    Smokeless tobacco: Never   Vaping Use    Vaping Use: Never used   Substance and Sexual Activity    Alcohol use: Never    Drug use: Never    Sexual activity: Defer        Past Medical History:   Diagnosis Date    Arthritis     Chest pain     COPD (chronic obstructive pulmonary disease)     Heart disease     Hyperlipemia     Hypertension     Limb swelling     Myocardial infarction     Seasonal allergies     Stomach disorder     Stroke         Immunization History   Administered  Date(s) Administered    COVID-19 (MODERNA) 1st,2nd,3rd Dose Monovalent 01/01/2021, 03/25/2021, 04/22/2021    Fluzone (or Fluarix & Flulaval for VFC) >6mos 10/12/2020    Fluzone High-Dose 65+yrs 11/29/2021, 11/15/2023    Pneumococcal Conjugate 13-Valent (PCV13) 08/15/2022    Pneumococcal Polysaccharide (PPSV23) 08/21/2023         Allergies   Allergen Reactions    Morphine Unknown - High Severity    Oxycodone-Acetaminophen Unknown - High Severity    Statins Myalgia    Sulfa Antibiotics Unknown - High Severity          Current Outpatient Medications:     amLODIPine (NORVASC) 10 MG tablet, Take 1 tablet by mouth Every Night., Disp: , Rfl:     carvedilol (COREG) 6.25 MG tablet, Take 1 tablet by mouth 2 (Two) Times a Day., Disp: 180 tablet, Rfl: 3    clopidogrel (PLAVIX) 75 MG tablet, Take 1 tablet by mouth Daily., Disp: 90 tablet, Rfl: 3    Evolocumab (REPATHA) solution prefilled syringe injection, Inject 1 mL under the skin into the appropriate area as directed Every 14 (Fourteen) Days., Disp: 6 each, Rfl: 3    ezetimibe (ZETIA) 10 MG tablet, Take 1 tablet by mouth Daily., Disp: 90 tablet, Rfl: 3    fluticasone (FLONASE) 50 MCG/ACT nasal spray, PLACE 1 SPRAY EACH NOSTRIL ONCE DAILY, Disp: , Rfl:     HYDROcodone-acetaminophen (NORCO)  MG per tablet, Take 1 tablet by mouth Every 4 (Four) Hours As Needed., Disp: , Rfl:     metFORMIN ER (GLUCOPHAGE-XR) 500 MG 24 hr tablet, Take 1 tablet by mouth Daily With Breakfast., Disp: , Rfl:     montelukast (SINGULAIR) 10 MG tablet, Take 1 tablet by mouth Every Night., Disp: , Rfl:     nitroglycerin (NITRODUR) 0.2 MG/HR patch, Place 1 patch on the skin as directed by provider Daily., Disp: 30 patch, Rfl: 11    nitroglycerin (NITROSTAT) 0.4 MG SL tablet, 1 under the tongue as needed for angina, may repeat q5mins for up three doses, Disp: 100 tablet, Rfl: 11    pantoprazole (PROTONIX) 40 MG EC tablet, Take 1 tablet by mouth Daily., Disp: , Rfl:      Objective   Physical  "Exam  Constitutional:       General: She is not in acute distress.     Appearance: Normal appearance. She is normal weight.   HENT:      Right Ear: Hearing normal.      Left Ear: Hearing normal.      Nose: No nasal tenderness or congestion.      Mouth/Throat:      Mouth: Mucous membranes are moist. No oral lesions.   Eyes:      Extraocular Movements: Extraocular movements intact.      Pupils: Pupils are equal, round, and reactive to light.   Neck:      Thyroid: No thyroid mass or thyromegaly.   Cardiovascular:      Rate and Rhythm: Normal rate and regular rhythm.      Pulses: Normal pulses.      Heart sounds: Normal heart sounds. No murmur heard.  Pulmonary:      Effort: Pulmonary effort is normal.      Breath sounds: Normal breath sounds. No wheezing, rhonchi or rales.   Musculoskeletal:      Cervical back: Neck supple.      Right lower leg: No edema.      Left lower leg: No edema.   Lymphadenopathy:      Cervical: No cervical adenopathy.      Upper Body:      Right upper body: No axillary adenopathy.   Skin:     General: Skin is warm and dry.      Findings: No lesion or rash.   Neurological:      General: No focal deficit present.      Mental Status: She is alert and oriented to person, place, and time.   Psychiatric:         Mood and Affect: Affect normal. Mood is not anxious or depressed.         Vital Signs:   /45 (BP Location: Right arm, Patient Position: Sitting, Cuff Size: Adult)   Pulse 63   Temp 97.8 °F (36.6 °C) (Temporal)   Resp 16   Ht 149.9 cm (59\")   Wt 55.3 kg (122 lb)   SpO2 97% Comment: RA  BMI 24.64 kg/m²        Result Review :   The following data was reviewed by: ILENE Gilbert on 11/15/2023:  CMP          5/3/2023    11:31   CMP   Glucose 110    BUN 10    Creatinine 0.57    EGFR 96.7    Sodium 139    Potassium 3.9    Chloride 105    Calcium 8.8    BUN/Creatinine Ratio 17.5    Anion Gap 8.6      CBC w/diff          5/3/2023    11:31   CBC w/Diff   WBC 10.40    RBC 4.31  "   Hemoglobin 12.7    Hematocrit 36.3    MCV 84.2    MCH 29.5    MCHC 35.0    RDW 12.8    Platelets 348      Data reviewed : Radiologic studies chest CT 9/2/2022, chest CT 11/2/2023    Procedures        Assessment and Plan    Diagnoses and all orders for this visit:    1. Lung nodule (Primary)  -     NM PET/CT Skull Base to Mid Thigh; Future    2. Pulmonary emphysema, unspecified emphysema type  -     Alpha - 1 - Antitrypsin; Future  -     Complete PFT - Pre & Post Bronchodilator; Future    3. Nicotine dependence, cigarettes, uncomplicated    4. Encounter for immunization  -     Fluzone High-Dose 65+yrs ([id number])    5. Smoking cessation counseling provided.  I spent 5 minutes today counseling patient on the risks of smoking, including throat cancer, lung cancer, COPD, heart disease and death.  Also discussed the benefits of quitting.  6.  Follow-up after PET scan to go over results and determine next steps for pulmonary nodule.    I spent 50 minutes caring for Thi on this date of service. This time includes time spent by me in the following activities:preparing for the visit, reviewing tests, obtaining and/or reviewing a separately obtained history, performing a medically appropriate examination and/or evaluation , counseling and educating the patient/family/caregiver, ordering medications, tests, or procedures, referring and communicating with other health care professionals , and documenting information in the medical record    Follow Up   Return for PET-CT follow up.  Patient was given instructions and counseling regarding her condition or for health maintenance advice. Please see specific information pulled into the AVS if appropriate.

## 2023-11-16 NOTE — PATIENT INSTRUCTIONS
"Smoking Tobacco Information, Adult  Smoking tobacco can be harmful to your health. Tobacco contains a toxic colorless chemical called nicotine. Nicotine causes changes in your brain that make you want more and more. This is called addiction. This can make it hard to stop smoking once you start. Tobacco also has other toxic chemicals that can hurt your body and raise your risk of many cancers.  Menthol or \"lite\" tobacco or cigarette brands are not safer than regular brands.  How can smoking tobacco affect me?  Smoking tobacco puts you at risk for:  Cancer. Smoking is most commonly associated with lung cancer, but can also lead to cancer in other parts of the body.  Chronic obstructive pulmonary disease (COPD). This is a long-term lung condition that makes it hard to breathe. It also gets worse over time.  High blood pressure (hypertension), heart disease, stroke, heart attack, and lung infections, such as pneumonia.  Cataracts. This is when the lenses in the eyes become clouded.  Digestive problems. This may include peptic ulcers, heartburn, and gastroesophageal reflux disease (GERD).  Oral health problems, such as gum disease, mouth sores, and tooth loss.  Loss of taste and smell.  Smoking also affects how you look and smell. Smoking may cause:  Wrinkles.  Yellow or stained teeth, fingers, and fingernails.  Bad breath.  Bad-smelling clothes and hair.  Smoking tobacco can also affect your social life, because:  It may be challenging to find places to smoke when away from home. Many workplaces, restaurants, hotels, and public places are tobacco-free.  Smoking is expensive. This is due to the cost of tobacco and the long-term costs of treating health problems from smoking.  Secondhand smoke may affect those around you. Secondhand smoke can cause lung cancer, breathing problems, and heart disease. Children of smokers have a higher risk for:  Sudden infant death syndrome (SIDS).  Ear infections.  Lung infections.  What " actions can I take to prevent health problems?  Quit smoking    Do not start smoking. Quit if you already smoke.  Do not replace cigarette smoking with vaping devices, such as e-cigarettes.  Make a plan to quit smoking and commit to it. Look for programs to help you, and ask your health care provider for recommendations and ideas. Set a date and write down all the reasons you want to quit.  Let your friends and family know you are quitting so they can help and support you. Consider finding friends who also want to quit. It can be easier to quit with someone else, so that you can support each other.  Talk with your health care provider about using nicotine replacement medicines to help you quit. These include gum, lozenges, patches, sprays, or pills.  If you try to quit but return to smoking, stay positive. It is common to slip up when you first quit, so take it one day at a time.  Be prepared for cravings. When you feel the urge to smoke, chew gum or suck on hard candy.  Lifestyle  Stay busy.  Take care of your body. Get plenty of exercise, eat a healthy diet, and drink plenty of water.  Find ways to manage your stress, such as meditation, yoga, exercise, or time spent with friends and family.  Ask your health care provider about having regular tests (screenings) to check for cancer. This may include blood tests, imaging tests, and other tests.  Where to find support  To get support to quit smoking, consider:  Asking your health care provider for more information and resources.  Joining a support group for people who want to quit smoking in your local community. There are many effective programs that may help you to quit.  Calling the smokefree.gov counselor helpline at 6-593-QUIT-NOW (1-384.493.3217).  Where to find more information  You may find more information about quitting smoking from:  Centers for Disease Control and Prevention: cdc.gov/tobacco  Smokefree.gov: smokefree.gov  American Lung Association:  [employer]  Contact a health care provider if:  You have problems breathing.  Your lips, nose, or fingers turn blue.  You have chest pain.  You are coughing up blood.  You feel like you will faint.  You have other health changes that cause you to worry.  Summary  Smoking tobacco can negatively affect your health, the health of those around you, your finances, and your social life.  Do not start smoking. Quit if you already smoke. If you need help quitting, ask your health care provider.  Consider joining a support group for people in your local community who want to quit smoking. There are many effective programs that may help you to quit.  This information is not intended to replace advice given to you by your health care provider. Make sure you discuss any questions you have with your health care provider.  Document Revised: 12/13/2022 Document Reviewed: 12/13/2022  Elsevier Patient Education © 2023 [employer]

## 2023-11-21 ENCOUNTER — HOSPITAL ENCOUNTER (OUTPATIENT)
Dept: PET IMAGING | Facility: HOSPITAL | Age: 73
Discharge: HOME | End: 2023-11-21
Payer: MEDICARE

## 2023-11-21 DIAGNOSIS — R91.1 LUNG NODULE: ICD-10-CM

## 2023-11-21 PROCEDURE — A9552 F18 FDG: HCPCS | Performed by: NURSE PRACTITIONER

## 2023-11-21 PROCEDURE — 0 FLUDEOXYGLUCOSE F18 SOLUTION: Performed by: NURSE PRACTITIONER

## 2023-11-21 PROCEDURE — 78815 PET IMAGE W/CT SKULL-THIGH: CPT

## 2023-11-21 RX ADMIN — FLUDEOXYGLUCOSE F 18 1 DOSE: 200 INJECTION, SOLUTION INTRAVENOUS at 13:29

## 2023-11-22 ENCOUNTER — PREP FOR SURGERY (OUTPATIENT)
Dept: OTHER | Facility: HOSPITAL | Age: 73
End: 2023-11-22
Payer: MEDICARE

## 2023-11-22 ENCOUNTER — TELEPHONE (OUTPATIENT)
Dept: GASTROENTEROLOGY | Facility: CLINIC | Age: 73
End: 2023-11-22
Payer: MEDICARE

## 2023-11-22 DIAGNOSIS — R94.8 ABNORMAL PET SCAN OF COLON: Primary | ICD-10-CM

## 2023-11-22 DIAGNOSIS — R94.8 ABNORMAL GASTROINTESTINAL PET SCAN: Primary | ICD-10-CM

## 2023-11-22 NOTE — TELEPHONE ENCOUNTER
S/w Dr. Tamez. He would like pt to have Colonoscopy next week for abnormal PET Scan. I have created CR. Called pt. No answer. Left message for pt to call back. I reviewed with Dr. العلي; he advised pt can be added to his schedule next week. Hopefully, pt can come on 11.30.23.

## 2023-11-27 ENCOUNTER — TELEPHONE (OUTPATIENT)
Dept: PULMONOLOGY | Facility: CLINIC | Age: 73
End: 2023-11-27
Payer: MEDICARE

## 2023-11-28 ENCOUNTER — TELEPHONE (OUTPATIENT)
Dept: GASTROENTEROLOGY | Facility: CLINIC | Age: 73
End: 2023-11-28
Payer: MEDICARE

## 2023-11-29 ENCOUNTER — TELEPHONE (OUTPATIENT)
Dept: GASTROENTEROLOGY | Facility: CLINIC | Age: 73
End: 2023-11-29
Payer: MEDICARE

## 2023-11-29 NOTE — TELEPHONE ENCOUNTER
URGENT REQUEST  Patient needs Procedure ASAP                             Cardiac/Blood Thinner Clearance Request                  2023      Dear Dr. Nichols,    Patient Name: Thi Lynn  : 1950    This patient is waiting to have a Colonoscopy and/or Esophagogastroduodenoscopy which I will perform at [facility] _PENDING APPROVAL__. Our records indicate this patient is currently taking _PLAVIX_. This procedure requires the patient to suspend their Anticoagulant medication prior to surgery. Please respond to this request noting your recommendations. You may contact our office at [phone] Option 2 with any questions. I appreciate your prompt response in this matter. Please return this form to our office as soon as possible to [phone].    ____ I approve my patient to stop taking their Anticoagulant Therapy medication _5_ days prior to the scheduled procedure.    ____ I do NOT approve my patient to stop taking their Anticoagulant Therapy medication at this time.    ____ I approve my patient from a cardiac standpoint.    ____ I do NOT approve my patient from a cardiac standpoint at this time.        Approving physician name (please print): _____________________________________________      Approving physician signature: ________________________________ Date:________________    Sincerely,    [facility] [place] - Gastroenterology [facility]  Dr. العلي's Office    *Please fax approval or denial to our office as soon as possible.

## 2023-11-29 NOTE — TELEPHONE ENCOUNTER
Patient is low risk for perioperative cardiac complications based on review of medical records. She may hold plavix for 5-7 days as requested.

## 2023-11-29 NOTE — TELEPHONE ENCOUNTER
Spoke to patient about results. Informed patient Lisa has been trying to contact her about scheduling the colonoscopy. Gave her their phone number. Also, let her know she already has an appointment with [facility] on 12/22/2023, to keep that appointment if her colonoscopy is done prior to appointment, or would need to R/S.

## 2023-11-29 NOTE — TELEPHONE ENCOUNTER
Procedure: Colonoscopy and/or EGD    Medication Directive: Plavix    PMH: CAD, HTN, HLD, hx CABG    Last Seen: 09/06/23    LHC: 05/03/23    2 out of 2 patent grafts (LIMA to LAD and saphenous vein graft to RCA) with mild, nonobstructive disease in the body of the graft.  Small OM branch with borderline stenosis.  This is not where the stress test showed up was possibly abnormal.  Normal LVEDP     Recommendations:   Continued goal-directed medical management and risk factor reduction.  The patient already is on amlodipine and Coreg.  Could consider a trial of Ranexa.

## 2023-12-04 ENCOUNTER — ANESTHESIA EVENT (OUTPATIENT)
Dept: GASTROENTEROLOGY | Facility: HOSPITAL | Age: 73
End: 2023-12-04
Payer: MEDICARE

## 2023-12-04 NOTE — ANESTHESIA PREPROCEDURE EVALUATION
Anesthesia Evaluation     Patient summary reviewed and Nursing notes reviewed   NPO Solid Status: > 8 hours  NPO Liquid Status: > 4 hours           Airway   Mallampati: II  TM distance: >3 FB  Neck ROM: full  No difficulty expected  Dental    (+) upper dentures and partials    Comment: Removing prior to procedure     Pulmonary - normal exam    breath sounds clear to auscultation  (+) a smoker Current, Smoked day of surgery, COPD moderate,  Cardiovascular - normal exam  Exercise tolerance: poor (<4 METS)    Rhythm: regular  Rate: normal    (+) hypertension well controlled, past MI  >12 months, CAD, CABG >6 Months, dysrhythmias Atrial Fib, angina, hyperlipidemia      Neuro/Psych  (+) CVA  GI/Hepatic/Renal/Endo      Musculoskeletal     Abdominal    Substance History      OB/GYN          Other   arthritis,     ROS/Med Hx Other: 11/22/23- pet scan shows area of concern in colon     Last plavix  5 days ago ( 11/30)    ECHO 04/21/23:     Left Ventricle Left ventricular systolic function is hyperdynamic (EF > 70%). Calculated left ventricular EF = 74%     Normal left ventricular cavity size noted. Left ventricular wall thickness is consistent with concentric hypertrophy. All left ventricular wall segments contract normally.  Right Ventricle Normal right ventricular cavity size, wall thickness, systolic function and septal motion noted.  Left Atrium. Normal left atrial size and volume noted.  Right Atrium Normal right atrial cavity size noted.  Aortic Valve. No aortic valve regurgitation or stenosis is present. The aortic valve is abnormal in structure. There is calcification of the aortic valve.  Mitral Valve. There is calcification of the mitral valve. Trace mitral valve regurgitation is present. No significant mitral valve stenosis is present.  Tricuspid Valve The tricuspid valve is structurally normal with no significant stenosis present. Mild tricuspid valve regurgitation is present. Estimated right ventricular  systolic pressure from tricuspid regurgitation is normal (<35 mmHg).  Pulmonic Valve. The pulmonic valve is structurally normal with no regurgitation or significant stenosis present.  Greater Vessels.No dilation of the aortic root is present.  Pericardium. The pericardium is normal. There is no evidence of pericardial effusion.     EKG 08/11/21:   SR HR 66, probable left atrial enlargement             Phys Exam Other: Recent congestion and chronic cough- no fever -  Ordered duoneb prior to procedure                    Anesthesia Plan    ASA 4     general   total IV anesthesia  intravenous induction     Anesthetic plan, risks, benefits, and alternatives have been provided, discussed and informed consent has been obtained with: patient and child.  Pre-procedure education provided  Plan discussed with CRNA.    CODE STATUS:

## 2023-12-05 ENCOUNTER — APPOINTMENT (OUTPATIENT)
Dept: RESPIRATORY THERAPY | Facility: HOSPITAL | Age: 73
End: 2023-12-05
Payer: MEDICARE

## 2023-12-05 ENCOUNTER — HOSPITAL ENCOUNTER (OUTPATIENT)
Facility: HOSPITAL | Age: 73
Setting detail: HOSPITAL OUTPATIENT SURGERY
Discharge: HOME | End: 2023-12-05
Attending: INTERNAL MEDICINE | Admitting: INTERNAL MEDICINE
Payer: MEDICARE

## 2023-12-05 ENCOUNTER — ANESTHESIA (OUTPATIENT)
Dept: GASTROENTEROLOGY | Facility: HOSPITAL | Age: 73
End: 2023-12-05
Payer: MEDICARE

## 2023-12-05 VITALS
HEART RATE: 70 BPM | OXYGEN SATURATION: 97 % | DIASTOLIC BLOOD PRESSURE: 54 MMHG | RESPIRATION RATE: 14 BRPM | WEIGHT: 117.73 LBS | BODY MASS INDEX: 23.78 KG/M2 | SYSTOLIC BLOOD PRESSURE: 132 MMHG | TEMPERATURE: 97.5 F

## 2023-12-05 DIAGNOSIS — R94.8 ABNORMAL PET SCAN OF COLON: ICD-10-CM

## 2023-12-05 LAB — GLUCOSE BLDC GLUCOMTR-MCNC: 119 MG/DL (ref 70–99)

## 2023-12-05 PROCEDURE — 82948 REAGENT STRIP/BLOOD GLUCOSE: CPT

## 2023-12-05 PROCEDURE — 25810000003 LACTATED RINGERS PER 1000 ML: Performed by: ANESTHESIOLOGY

## 2023-12-05 PROCEDURE — 25810000003 LACTATED RINGERS PER 1000 ML: Performed by: MARRIAGE & FAMILY THERAPIST

## 2023-12-05 PROCEDURE — 88305 TISSUE EXAM BY PATHOLOGIST: CPT | Performed by: INTERNAL MEDICINE

## 2023-12-05 PROCEDURE — 25010000002 PROPOFOL 10 MG/ML EMULSION: Performed by: MARRIAGE & FAMILY THERAPIST

## 2023-12-05 RX ORDER — SODIUM CHLORIDE, SODIUM LACTATE, POTASSIUM CHLORIDE, CALCIUM CHLORIDE 600; 310; 30; 20 MG/100ML; MG/100ML; MG/100ML; MG/100ML
30 INJECTION, SOLUTION INTRAVENOUS CONTINUOUS
Status: DISCONTINUED | OUTPATIENT
Start: 2023-12-05 | End: 2023-12-05 | Stop reason: HOSPADM

## 2023-12-05 RX ORDER — IPRATROPIUM BROMIDE AND ALBUTEROL SULFATE 2.5; .5 MG/3ML; MG/3ML
3 SOLUTION RESPIRATORY (INHALATION) ONCE
Status: COMPLETED | OUTPATIENT
Start: 2023-12-05 | End: 2023-12-05

## 2023-12-05 RX ORDER — SODIUM CHLORIDE, SODIUM LACTATE, POTASSIUM CHLORIDE, CALCIUM CHLORIDE 600; 310; 30; 20 MG/100ML; MG/100ML; MG/100ML; MG/100ML
INJECTION, SOLUTION INTRAVENOUS CONTINUOUS PRN
Status: DISCONTINUED | OUTPATIENT
Start: 2023-12-05 | End: 2023-12-05 | Stop reason: SURG

## 2023-12-05 RX ORDER — PROPOFOL 10 MG/ML
VIAL (ML) INTRAVENOUS AS NEEDED
Status: DISCONTINUED | OUTPATIENT
Start: 2023-12-05 | End: 2023-12-05 | Stop reason: SURG

## 2023-12-05 RX ORDER — LIDOCAINE HYDROCHLORIDE 20 MG/ML
INJECTION, SOLUTION EPIDURAL; INFILTRATION; INTRACAUDAL; PERINEURAL AS NEEDED
Status: DISCONTINUED | OUTPATIENT
Start: 2023-12-05 | End: 2023-12-05 | Stop reason: SURG

## 2023-12-05 RX ADMIN — PROPOFOL 100 MG: 10 INJECTION, EMULSION INTRAVENOUS at 16:14

## 2023-12-05 RX ADMIN — SODIUM CHLORIDE, POTASSIUM CHLORIDE, SODIUM LACTATE AND CALCIUM CHLORIDE 30 ML/HR: 600; 310; 30; 20 INJECTION, SOLUTION INTRAVENOUS at 13:44

## 2023-12-05 RX ADMIN — SODIUM CHLORIDE, POTASSIUM CHLORIDE, SODIUM LACTATE AND CALCIUM CHLORIDE: 600; 310; 30; 20 INJECTION, SOLUTION INTRAVENOUS at 16:13

## 2023-12-05 RX ADMIN — IPRATROPIUM BROMIDE AND ALBUTEROL SULFATE 3 ML: .5; 3 SOLUTION RESPIRATORY (INHALATION) at 13:44

## 2023-12-05 RX ADMIN — LIDOCAINE HYDROCHLORIDE 50 MG: 20 INJECTION, SOLUTION EPIDURAL; INFILTRATION; INTRACAUDAL; PERINEURAL at 16:14

## 2023-12-05 RX ADMIN — PROPOFOL 144 MCG/KG/MIN: 10 INJECTION, EMULSION INTRAVENOUS at 16:14

## 2023-12-05 NOTE — H&P
Pre Procedure History & Physical    Chief Complaint:   Abnormal PET scan    Subjective     HPI:   Abnormal PET scan    Past Medical History:   Past Medical History:   Diagnosis Date    Arthritis     Asthma     Chest pain     COPD (chronic obstructive pulmonary disease)     Diabetes mellitus     Elevated cholesterol     Heart disease     Hyperlipemia     Hypertension     Limb swelling     Myocardial infarction     Seasonal allergies     Stomach disorder     Stroke        Past Surgical History:  Past Surgical History:   Procedure Laterality Date    CARDIAC CATHETERIZATION N/A 5/3/2023    Procedure: Left Heart Cath with possible coronary angioplasty;  Surgeon: Jason Nichols MD;  Location: [facility] CATH INVASIVE LOCATION;  Service: Cardiology;  Laterality: N/A;    CATARACT EXTRACTION      Cataract Surgery    CHOLECYSTECTOMY      COLONOSCOPY  2019    CORONARY ARTERY BYPASS GRAFT  2009    3v    ENDOSCOPY      + 10 years [facility] in [place]    HYSTERECTOMY         Family History:  Family History   Problem Relation Age of Onset    Heart disease Father     Cancer Father         Unspecified    Heart disease Brother     Colon polyps Mother        Social History:   reports that she has been smoking cigarettes. She started smoking about 52 years ago. She has a 13.00 pack-year smoking history. She has been exposed to tobacco smoke. She has never used smokeless tobacco. She reports that she does not drink alcohol and does not use drugs.    Medications:   Medications Prior to Admission   Medication Sig Dispense Refill Last Dose    amLODIPine (NORVASC) 10 MG tablet Take 1 tablet by mouth Every Night.   12/4/2023    carvedilol (COREG) 6.25 MG tablet Take 1 tablet by mouth 2 (Two) Times a Day. 180 tablet 3 12/4/2023    Evolocumab (REPATHA) solution prefilled syringe injection Inject 1 mL under the skin into the appropriate area as directed Every 14 (Fourteen) Days. 6 each 3 Past Month    ezetimibe (ZETIA) 10 MG tablet  Take 1 tablet by mouth Daily. 90 tablet 3 12/4/2023    fluticasone (FLONASE) 50 MCG/ACT nasal spray PLACE 1 SPRAY EACH NOSTRIL ONCE DAILY   12/4/2023    HYDROcodone-acetaminophen (NORCO)  MG per tablet Take 1 tablet by mouth Every 4 (Four) Hours As Needed.   Past Week    metFORMIN ER (GLUCOPHAGE-XR) 500 MG 24 hr tablet Take 1 tablet by mouth Daily With Breakfast.   12/4/2023    montelukast (SINGULAIR) 10 MG tablet Take 1 tablet by mouth Every Night.   Past Week    nitroglycerin (NITRODUR) 0.2 MG/HR patch Place 1 patch on the skin as directed by provider Daily. 30 patch 11 12/4/2023    pantoprazole (PROTONIX) 40 MG EC tablet Take 1 tablet by mouth Daily.   12/4/2023    clopidogrel (PLAVIX) 75 MG tablet Take 1 tablet by mouth Daily. 90 tablet 3 11/30/2023    nitroglycerin (NITROSTAT) 0.4 MG SL tablet 1 under the tongue as needed for angina, may repeat q5mins for up three doses 100 tablet 11 More than a month    PEG 3350-KCl-NaBcb-NaCl-NaSulf 227.1 g pack Take 4 L by mouth Take As Directed. Indications: Colonoscopy 1 each 0        Allergies:  Morphine, Oxycodone-acetaminophen, Statins, and Sulfa antibiotics        Objective     Blood pressure 148/60, pulse 76, temperature 97.8 °F (36.6 °C), temperature source Temporal, resp. rate 16, weight 53.4 kg (117 lb 11.6 oz), SpO2 94%.    Physical Exam   Constitutional: Pt is oriented to person, place, and time and well-developed, well-nourished, and in no distress.   Mouth/Throat: Oropharynx is clear and moist.   Neck: Normal range of motion.   Cardiovascular: Normal rate, regular rhythm and normal heart sounds.    Pulmonary/Chest: Effort normal and breath sounds normal.   Abdominal: Soft. Nontender  Skin: Skin is warm and dry.   Psychiatric: Mood, memory, affect and judgment normal.     Assessment & Plan     Diagnosis:  Abnormal PET scan    Anticipated Surgical Procedure:  Colonoscopy    The risks, benefits, and alternatives of this procedure have been discussed with the  patient or the responsible party- the patient understands and agrees to proceed.

## 2023-12-05 NOTE — ANESTHESIA POSTPROCEDURE EVALUATION
Patient: Thi Lynn    Procedure Summary       Date: 12/05/23 Room / Location: [facility] ENDOSCOPY 3 / [facility] ENDOSCOPY    Anesthesia Start: 1613 Anesthesia Stop: 1641    Procedure: COLONOSCOPY WITH COLD SNARE POLYPECTOMY Diagnosis:       Abnormal PET scan of colon      (Abnormal PET scan of colon [R94.8])    Surgeons: Josue العلي MD Provider: Gregorio Schulte CRNA    Anesthesia Type: general ASA Status: 4            Anesthesia Type: general    Vitals  Vitals Value Taken Time   /54 12/05/23 1658   Temp 36.4 °C (97.5 °F) 12/05/23 1658   Pulse 70 12/05/23 1658   Resp 14 12/05/23 1658   SpO2 97 % 12/05/23 1658           Post Anesthesia Care and Evaluation    Patient location during evaluation: bedside  Patient participation: complete - patient participated  Level of consciousness: awake  Pain management: adequate    Airway patency: patent  Anesthetic complications: No anesthetic complications  PONV Status: controlled  Cardiovascular status: acceptable and stable  Respiratory status: acceptable

## 2023-12-07 LAB
CYTO UR: NORMAL
LAB AP CASE REPORT: NORMAL
LAB AP CLINICAL INFORMATION: NORMAL
PATH REPORT.FINAL DX SPEC: NORMAL
PATH REPORT.GROSS SPEC: NORMAL

## 2023-12-09 NOTE — PROGRESS NOTES
Colonoscopy 12/5/2023: Good bowel prep, small polyp in the cecum removed-adenomatous/benign, diverticula in the sigmoid    Repeat colonoscopy in 5 years  Please make sure Dr. Tamez receives copy

## 2023-12-20 NOTE — H&P (VIEW-ONLY)
Primary Care Provider  Amanda Mike APRN     Referring Provider  No ref. provider found     Chief Complaint  Results (PET), Follow-up, and Fatigue    Subjective          Thi Lynn presents to [facility] PULMONARY & CRITICAL CARE MEDICINE  History of Present Illness  Thi Lynn is a 73 y.o. female patient recently seen by myself.  She is here for management of emphysema, pulmonary nodule and tobacco use cigarettes ongoing.    Patient recently underwent a PET scan on 11/21/2023.  Her PET scan did show evidence of abnormal activity in the ascending colon and finding this could suggest the presence of an annular constricting lesion.  Patient has undergone a colonoscopy which was unremarkable.  Her PET scan also shows multiple pulmonary nodules associated with abnormal uptake.  These findings suggest pulmonary metastatic disease.  There is also an ill-defined reticulonodular density and areas of septal thickening that are noted within the right upper lobe.  After speaking with Dr. Tamez, it is recommended the patient undergo a robotic bronchoscopy.  I have discussed risks and benefits with patient and her .  She is agreeable to proceeding with the procedure and would like for Dr. Tamez to be the physician to perform the procedure.  She continues to smoke approximately 0.5 packs of cigarettes daily and is working on cutting back.  She will intermittently use her albuterol for episodes of shortness of breath.  She did recently have a course of doxycycline and does complain of fatigue.  She is still able to perform her ADLs without difficulty.  She is up-to-date with her COVID, flu and pneumonia vaccines.    ION valentina     Her history of smoking is   Tobacco Use: High Risk (12/22/2023)    Patient History     Smoking Tobacco Use: Every Day     Smokeless Tobacco Use: Never     Passive Exposure: Current     Thi Lynn  reports that she has been smoking cigarettes. She started  smoking about 53 years ago. She has a 26.00 pack-year smoking history. She has been exposed to tobacco smoke. She has never used smokeless tobacco.. I have educated her on the risk of diseases from using tobacco products such as cancer, COPD, and heart disease.     I advised her to quit and she is willing to quit. We have discussed the following method/s for tobacco cessation:  Education Material Counseling [place].  Encourage a quit date for 1 month from today.  She will follow up with me in 2 weeks or sooner to check on her progress.    I spent 5 minutes counseling the patient.      Review of Systems   Constitutional:  Positive for fatigue. Negative for chills, fever, unexpected weight gain and unexpected weight loss.   HENT:  Congestion: Nasal.    Respiratory:  Positive for shortness of breath. Negative for apnea, cough and wheezing.         Negative for Hemoptysis     Cardiovascular:  Negative for chest pain, palpitations and leg swelling.   Skin:         Negative for cyanosis      Sleep: Negative for Excessive daytime sleepiness  Negative for morning headaches  Negative for Snoring    Family History   Problem Relation Age of Onset    Heart disease Father     Cancer Father         Unspecified    Heart disease Brother     Colon polyps Mother         Social History     Socioeconomic History    Marital status:    Tobacco Use    Smoking status: Every Day     Packs/day: 0.50     Years: 52.00     Additional pack years: 0.00     Total pack years: 26.00     Types: Cigarettes     Start date: 1971     Passive exposure: Current    Smokeless tobacco: Never   Vaping Use    Vaping Use: Never used   Substance and Sexual Activity    Alcohol use: Never    Drug use: Never    Sexual activity: Defer        Past Medical History:   Diagnosis Date    Arthritis     Asthma     Chest pain     COPD (chronic obstructive pulmonary disease)     Diabetes mellitus     Elevated cholesterol     Heart disease     Hyperlipemia      Hypertension     Limb swelling     Myocardial infarction     Seasonal allergies     Stomach disorder     Stroke         Immunization History   Administered Date(s) Administered    COVID-19 (MODERNA) 1st,2nd,3rd Dose Monovalent 01/01/2021, 03/25/2021, 04/22/2021    Fluzone (or Fluarix & Flulaval for VFC) >6mos 10/12/2020    Fluzone High-Dose 65+yrs 11/29/2021, 11/15/2023    Pneumococcal Conjugate 13-Valent (PCV13) 08/15/2022    Pneumococcal Polysaccharide (PPSV23) 08/21/2023         Allergies   Allergen Reactions    Morphine Unknown - High Severity    Oxycodone-Acetaminophen Unknown - High Severity    Statins Myalgia    Sulfa Antibiotics Unknown - High Severity          Current Outpatient Medications:     albuterol sulfate  (90 Base) MCG/ACT inhaler, 2 puffs Every 4 (Four) Hours As Needed for Wheezing or Shortness of Air., Disp: , Rfl:     amLODIPine (NORVASC) 10 MG tablet, Take 1 tablet by mouth Every Night., Disp: , Rfl:     carvedilol (COREG) 6.25 MG tablet, Take 1 tablet by mouth 2 (Two) Times a Day., Disp: 180 tablet, Rfl: 3    clopidogrel (PLAVIX) 75 MG tablet, Take 1 tablet by mouth Daily., Disp: 90 tablet, Rfl: 3    Evolocumab (REPATHA) solution prefilled syringe injection, Inject 1 mL under the skin into the appropriate area as directed Every 14 (Fourteen) Days., Disp: 6 each, Rfl: 3    ezetimibe (ZETIA) 10 MG tablet, Take 1 tablet by mouth Daily., Disp: 90 tablet, Rfl: 3    HYDROcodone-acetaminophen (NORCO)  MG per tablet, Take 1 tablet by mouth Every 4 (Four) Hours As Needed., Disp: , Rfl:     metFORMIN ER (GLUCOPHAGE-XR) 500 MG 24 hr tablet, Take 1 tablet by mouth Daily With Breakfast., Disp: , Rfl:     nitroglycerin (NITRODUR) 0.2 MG/HR patch, Place 1 patch on the skin as directed by provider Daily., Disp: 30 patch, Rfl: 11    nitroglycerin (NITROSTAT) 0.4 MG SL tablet, 1 under the tongue as needed for angina, may repeat q5mins for up three doses, Disp: 100 tablet, Rfl: 11    pantoprazole  (PROTONIX) 40 MG EC tablet, Take 1 tablet by mouth Daily., Disp: , Rfl:     traZODone (DESYREL) 150 MG tablet, , Disp: , Rfl:     doxycycline (VIBRAMYCIN) 100 MG capsule, Take 1 capsule by mouth. (Patient not taking: Reported on 12/22/2023), Disp: , Rfl:     fluticasone (FLONASE) 50 MCG/ACT nasal spray, PLACE 1 SPRAY EACH NOSTRIL ONCE DAILY (Patient not taking: Reported on 12/22/2023), Disp: , Rfl:     montelukast (SINGULAIR) 10 MG tablet, Take 1 tablet by mouth Every Night. (Patient not taking: Reported on 12/22/2023), Disp: , Rfl:     PEG 3350-KCl-NaBcb-NaCl-NaSulf 227.1 g pack, Take 4 L by mouth Take As Directed. Indications: Colonoscopy (Patient not taking: Reported on 12/22/2023), Disp: 1 each, Rfl: 0    predniSONE (DELTASONE) 10 MG tablet, TAKE 6 TABLETS (60 MG TOTAL) BY MOUTH DAILY FOR 5 DAYS. (Patient not taking: Reported on 12/22/2023), Disp: , Rfl:      Objective   Physical Exam  Constitutional:       General: She is not in acute distress.     Appearance: Normal appearance. She is normal weight.   HENT:      Right Ear: Hearing normal.      Left Ear: Hearing normal.      Nose: No nasal tenderness or congestion.      Mouth/Throat:      Mouth: Mucous membranes are moist. No oral lesions.   Eyes:      Extraocular Movements: Extraocular movements intact.      Pupils: Pupils are equal, round, and reactive to light.   Neck:      Thyroid: No thyroid mass or thyromegaly.   Cardiovascular:      Rate and Rhythm: Normal rate and regular rhythm.      Pulses: Normal pulses.      Heart sounds: Normal heart sounds. No murmur heard.  Pulmonary:      Effort: Pulmonary effort is normal.      Breath sounds: Decreased breath sounds present. No wheezing, rhonchi or rales.   Musculoskeletal:      Cervical back: Neck supple.      Right lower leg: No edema.      Left lower leg: No edema.   Lymphadenopathy:      Cervical: No cervical adenopathy.      Upper Body:      Right upper body: No axillary adenopathy.   Skin:     General:  "Skin is warm and dry.      Findings: No lesion or rash.   Neurological:      General: No focal deficit present.      Mental Status: She is alert and oriented to person, place, and time.   Psychiatric:         Mood and Affect: Affect normal. Mood is not anxious or depressed.         Vital Signs:   /42 (BP Location: Left arm, Patient Position: Sitting, Cuff Size: Adult)   Pulse 70   Temp 97.8 °F (36.6 °C) (Tympanic)   Resp 18   Ht 149.9 cm (59\")   Wt 54.4 kg (120 lb)   SpO2 96% Comment: ROOM AIR  BMI 24.24 kg/m²        Result Review :   The following data was reviewed by: ILENE Gilbert on 12/22/2023:  CMP          5/3/2023    11:31   CMP   Glucose 110    BUN 10    Creatinine 0.57    EGFR 96.7    Sodium 139    Potassium 3.9    Chloride 105    Calcium 8.8    BUN/Creatinine Ratio 17.5    Anion Gap 8.6      CBC w/diff          5/3/2023    11:31   CBC w/Diff   WBC 10.40    RBC 4.31    Hemoglobin 12.7    Hematocrit 36.3    MCV 84.2    MCH 29.5    MCHC 35.0    RDW 12.8    Platelets 348         Procedures        Assessment and Plan    Diagnoses and all orders for this visit:    1. Abnormal PET scan of lung (Primary)  -     Case Request; Standing  -     Follow Anesthesia Guidlines / Standing Orders; Standing  -     Obtain Informed Consent; Standing  -     Case Request  -     CT Chest Without Contrast; Future    2. Lung nodules  -     Case Request; Standing  -     Case Request  -     CT Chest Without Contrast; Future    3. Nicotine dependence, cigarettes, uncomplicated    4. Encounter for smoking cessation counseling    5. Dyspnea on exertion    6. Pulmonary emphysema, unspecified emphysema type    7.  Continue albuterol as needed.  8. Smoking cessation counseling provided.  I spent 5 minutes today counseling patient on the risks of smoking, including throat cancer, lung cancer, COPD, heart disease and death.  Also discussed the benefits of quitting.  9. I have obtained consent for robotic bronchoscopy with " brushings, biopsies, and bronchioloalveolar lavage.  Risks and benefits of bronchoscopy discussed with patient today.  Risks include pneumothorax, hemothorax, bleeding, hypoxia, required mechanical ventilation and death.  The patient recognizes these findings, acknowledges these findings and is agreeable to the procedure.  I will have our nurse navigator reach out to patient next week to get her procedure scheduled.  10.  Follow-up with me 1 week after procedure, sooner if needed    I spent 45 minutes caring for Thi on this date of service. This time includes time spent by me in the following activities:preparing for the visit, reviewing tests, obtaining and/or reviewing a separately obtained history, performing a medically appropriate examination and/or evaluation , counseling and educating the patient/family/caregiver, ordering medications, tests, or procedures, referring and communicating with other health care professionals , documenting information in the medical record, and care coordination    Follow Up   Return in about 12 days (around 1/3/2024) for bronch follow up.  Patient was given instructions and counseling regarding her condition or for health maintenance advice. Please see specific information pulled into the AVS if appropriate.

## 2023-12-20 NOTE — PROGRESS NOTES
Primary Care Provider  Amanda Mike APRN     Referring Provider  No ref. provider found     Chief Complaint  Results (PET), Follow-up, and Fatigue    Subjective          Thi Lynn presents to [facility]  History of Present Illness  Thi Lynn is a 73 y.o. female patient recently seen by myself.  She is here for management of emphysema, pulmonary nodule and tobacco use cigarettes ongoing.    Patient recently underwent a PET scan on 11/21/2023.  Her PET scan did show evidence of abnormal activity in the ascending colon and finding this could suggest the presence of an annular constricting lesion.  Patient has undergone a colonoscopy which was unremarkable.  Her PET scan also shows multiple pulmonary nodules associated with abnormal uptake.  These findings suggest pulmonary metastatic disease.  There is also an ill-defined reticulonodular density and areas of septal thickening that are noted within the right upper lobe.  After speaking with Dr. Tamez, it is recommended the patient undergo a robotic bronchoscopy.  I have discussed risks and benefits with patient and her .  She is agreeable to proceeding with the procedure and would like for Dr. Tamez to be the physician to perform the procedure.  She continues to smoke approximately 0.5 packs of cigarettes daily and is working on cutting back.  She will intermittently use her albuterol for episodes of shortness of breath.  She did recently have a course of doxycycline and does complain of fatigue.  She is still able to perform her ADLs without difficulty.  She is up-to-date with her COVID, flu and pneumonia vaccines.    [facility] valentina     Her history of smoking is   Tobacco Use: High Risk (12/22/2023)    Patient History     Smoking Tobacco Use: Every Day     Smokeless Tobacco Use: Never     Passive Exposure: Current     Thi Lynn  reports that she has been smoking cigarettes. She started  smoking about 53 years ago. She has a 26.00 pack-year smoking history. She has been exposed to tobacco smoke. She has never used smokeless tobacco.. I have educated her on the risk of diseases from using tobacco products such as cancer, COPD, and heart disease.     I advised her to quit and she is willing to quit. We have discussed the following method/s for tobacco cessation:  Education Material Counseling [place].  Encourage a quit date for 1 month from today.  She will follow up with me in 2 weeks or sooner to check on her progress.    I spent 5 minutes counseling the patient.      Review of Systems   Constitutional:  Positive for fatigue. Negative for chills, fever, unexpected weight gain and unexpected weight loss.   HENT:  Congestion: Nasal.    Respiratory:  Positive for shortness of breath. Negative for apnea, cough and wheezing.         Negative for Hemoptysis     Cardiovascular:  Negative for chest pain, palpitations and leg swelling.   Skin:         Negative for cyanosis      Sleep: Negative for Excessive daytime sleepiness  Negative for morning headaches  Negative for Snoring    Family History   Problem Relation Age of Onset    Heart disease Father     Cancer Father         Unspecified    Heart disease Brother     Colon polyps Mother         Social History     Socioeconomic History    Marital status:    Tobacco Use    Smoking status: Every Day     Packs/day: 0.50     Years: 52.00     Additional pack years: 0.00     Total pack years: 26.00     Types: Cigarettes     Start date: 1971     Passive exposure: Current    Smokeless tobacco: Never   Vaping Use    Vaping Use: Never used   Substance and Sexual Activity    Alcohol use: Never    Drug use: Never    Sexual activity: Defer        Past Medical History:   Diagnosis Date    Arthritis     Asthma     Chest pain     COPD (chronic obstructive pulmonary disease)     Diabetes mellitus     Elevated cholesterol     Heart disease     Hyperlipemia      Hypertension     Limb swelling     Myocardial infarction     Seasonal allergies     Stomach disorder     Stroke         Immunization History   Administered Date(s) Administered    COVID-19 (MODERNA) 1st,2nd,3rd Dose Monovalent 01/01/2021, 03/25/2021, 04/22/2021    Fluzone (or Fluarix & Flulaval for VFC) >6mos 10/12/2020    Fluzone High-Dose 65+yrs 11/29/2021, 11/15/2023    Pneumococcal Conjugate 13-Valent (PCV13) 08/15/2022    Pneumococcal Polysaccharide (PPSV23) 08/21/2023         Allergies   Allergen Reactions    Morphine Unknown - High Severity    Oxycodone-Acetaminophen Unknown - High Severity    Statins Myalgia    Sulfa Antibiotics Unknown - High Severity          Current Outpatient Medications:     albuterol sulfate  (90 Base) MCG/ACT inhaler, 2 puffs Every 4 (Four) Hours As Needed for Wheezing or Shortness of Air., Disp: , Rfl:     amLODIPine (NORVASC) 10 MG tablet, Take 1 tablet by mouth Every Night., Disp: , Rfl:     carvedilol (COREG) 6.25 MG tablet, Take 1 tablet by mouth 2 (Two) Times a Day., Disp: 180 tablet, Rfl: 3    clopidogrel (PLAVIX) 75 MG tablet, Take 1 tablet by mouth Daily., Disp: 90 tablet, Rfl: 3    Evolocumab (REPATHA) solution prefilled syringe injection, Inject 1 mL under the skin into the appropriate area as directed Every 14 (Fourteen) Days., Disp: 6 each, Rfl: 3    ezetimibe (ZETIA) 10 MG tablet, Take 1 tablet by mouth Daily., Disp: 90 tablet, Rfl: 3    HYDROcodone-acetaminophen (NORCO)  MG per tablet, Take 1 tablet by mouth Every 4 (Four) Hours As Needed., Disp: , Rfl:     metFORMIN ER (GLUCOPHAGE-XR) 500 MG 24 hr tablet, Take 1 tablet by mouth Daily With Breakfast., Disp: , Rfl:     nitroglycerin (NITRODUR) 0.2 MG/HR patch, Place 1 patch on the skin as directed by provider Daily., Disp: 30 patch, Rfl: 11    nitroglycerin (NITROSTAT) 0.4 MG SL tablet, 1 under the tongue as needed for angina, may repeat q5mins for up three doses, Disp: 100 tablet, Rfl: 11    pantoprazole  (PROTONIX) 40 MG EC tablet, Take 1 tablet by mouth Daily., Disp: , Rfl:     traZODone (DESYREL) 150 MG tablet, , Disp: , Rfl:     doxycycline (VIBRAMYCIN) 100 MG capsule, Take 1 capsule by mouth. (Patient not taking: Reported on 12/22/2023), Disp: , Rfl:     fluticasone (FLONASE) 50 MCG/ACT nasal spray, PLACE 1 SPRAY EACH NOSTRIL ONCE DAILY (Patient not taking: Reported on 12/22/2023), Disp: , Rfl:     montelukast (SINGULAIR) 10 MG tablet, Take 1 tablet by mouth Every Night. (Patient not taking: Reported on 12/22/2023), Disp: , Rfl:     PEG 3350-KCl-NaBcb-NaCl-NaSulf 227.1 g pack, Take 4 L by mouth Take As Directed. Indications: Colonoscopy (Patient not taking: Reported on 12/22/2023), Disp: 1 each, Rfl: 0    predniSONE (DELTASONE) 10 MG tablet, TAKE 6 TABLETS (60 MG TOTAL) BY MOUTH DAILY FOR 5 DAYS. (Patient not taking: Reported on 12/22/2023), Disp: , Rfl:      Objective   Physical Exam  Constitutional:       General: She is not in acute distress.     Appearance: Normal appearance. She is normal weight.   HENT:      Right Ear: Hearing normal.      Left Ear: Hearing normal.      Nose: No nasal tenderness or congestion.      Mouth/Throat:      Mouth: Mucous membranes are moist. No oral lesions.   Eyes:      Extraocular Movements: Extraocular movements intact.      Pupils: Pupils are equal, round, and reactive to light.   Neck:      Thyroid: No thyroid mass or thyromegaly.   Cardiovascular:      Rate and Rhythm: Normal rate and regular rhythm.      Pulses: Normal pulses.      Heart sounds: Normal heart sounds. No murmur heard.  Pulmonary:      Effort: Pulmonary effort is normal.      Breath sounds: Decreased breath sounds present. No wheezing, rhonchi or rales.   Musculoskeletal:      Cervical back: Neck supple.      Right lower leg: No edema.      Left lower leg: No edema.   Lymphadenopathy:      Cervical: No cervical adenopathy.      Upper Body:      Right upper body: No axillary adenopathy.   Skin:     General:  "Skin is warm and dry.      Findings: No lesion or rash.   Neurological:      General: No focal deficit present.      Mental Status: She is alert and oriented to person, place, and time.   Psychiatric:         Mood and Affect: Affect normal. Mood is not anxious or depressed.         Vital Signs:   /42 (BP Location: Left arm, Patient Position: Sitting, Cuff Size: Adult)   Pulse 70   Temp 97.8 °F (36.6 °C) (Tympanic)   Resp 18   Ht 149.9 cm (59\")   Wt 54.4 kg (120 lb)   SpO2 96% Comment: ROOM AIR  BMI 24.24 kg/m²        Result Review :   The following data was reviewed by: ILENE Gilbert on 12/22/2023:  CMP          5/3/2023    11:31   CMP   Glucose 110    BUN 10    Creatinine 0.57    EGFR 96.7    Sodium 139    Potassium 3.9    Chloride 105    Calcium 8.8    BUN/Creatinine Ratio 17.5    Anion Gap 8.6      CBC w/diff          5/3/2023    11:31   CBC w/Diff   WBC 10.40    RBC 4.31    Hemoglobin 12.7    Hematocrit 36.3    MCV 84.2    MCH 29.5    MCHC 35.0    RDW 12.8    Platelets 348         Procedures        Assessment and Plan    Diagnoses and all orders for this visit:    1. Abnormal PET scan of lung (Primary)  -     Case Request; Standing  -     Follow Anesthesia Guidlines / Standing Orders; Standing  -     Obtain Informed Consent; Standing  -     Case Request  -     CT Chest Without Contrast; Future    2. Lung nodules  -     Case Request; Standing  -     Case Request  -     CT Chest Without Contrast; Future    3. Nicotine dependence, cigarettes, uncomplicated    4. Encounter for smoking cessation counseling    5. Dyspnea on exertion    6. Pulmonary emphysema, unspecified emphysema type    7.  Continue albuterol as needed.  8. Smoking cessation counseling provided.  I spent 5 minutes today counseling patient on the risks of smoking, including throat cancer, lung cancer, COPD, heart disease and death.  Also discussed the benefits of quitting.  9. I have obtained consent for robotic bronchoscopy with " brushings, biopsies, and bronchioloalveolar lavage.  Risks and benefits of bronchoscopy discussed with patient today.  Risks include pneumothorax, hemothorax, bleeding, hypoxia, required mechanical ventilation and death.  The patient recognizes these findings, acknowledges these findings and is agreeable to the procedure.  I will have our nurse navigator reach out to patient next week to get her procedure scheduled.  10.  Follow-up with me 1 week after procedure, sooner if needed    I spent 45 minutes caring for Thi on this date of service. This time includes time spent by me in the following activities:preparing for the visit, reviewing tests, obtaining and/or reviewing a separately obtained history, performing a medically appropriate examination and/or evaluation , counseling and educating the patient/family/caregiver, ordering medications, tests, or procedures, referring and communicating with other health care professionals , documenting information in the medical record, and care coordination    Follow Up   Return in about 12 days (around 1/3/2024) for bronch follow up.  Patient was given instructions and counseling regarding her condition or for health maintenance advice. Please see specific information pulled into the AVS if appropriate.

## 2023-12-22 ENCOUNTER — OFFICE VISIT (OUTPATIENT)
Dept: PULMONOLOGY | Facility: CLINIC | Age: 73
End: 2023-12-22
Payer: MEDICARE

## 2023-12-22 VITALS
TEMPERATURE: 97.8 F | WEIGHT: 120 LBS | HEIGHT: 59 IN | RESPIRATION RATE: 18 BRPM | SYSTOLIC BLOOD PRESSURE: 125 MMHG | DIASTOLIC BLOOD PRESSURE: 42 MMHG | HEART RATE: 70 BPM | BODY MASS INDEX: 24.19 KG/M2 | OXYGEN SATURATION: 96 %

## 2023-12-22 DIAGNOSIS — R06.09 DYSPNEA ON EXERTION: ICD-10-CM

## 2023-12-22 DIAGNOSIS — J43.9 PULMONARY EMPHYSEMA, UNSPECIFIED EMPHYSEMA TYPE: ICD-10-CM

## 2023-12-22 DIAGNOSIS — R91.8 LUNG NODULES: ICD-10-CM

## 2023-12-22 DIAGNOSIS — Z71.6 ENCOUNTER FOR SMOKING CESSATION COUNSELING: ICD-10-CM

## 2023-12-22 DIAGNOSIS — F17.210 NICOTINE DEPENDENCE, CIGARETTES, UNCOMPLICATED: ICD-10-CM

## 2023-12-22 DIAGNOSIS — R94.2 ABNORMAL PET SCAN OF LUNG: Primary | ICD-10-CM

## 2023-12-22 RX ORDER — ALBUTEROL SULFATE 90 UG/1
2 AEROSOL, METERED RESPIRATORY (INHALATION) EVERY 4 HOURS PRN
COMMUNITY

## 2023-12-22 RX ORDER — PREDNISONE 10 MG/1
TABLET ORAL
COMMUNITY
Start: 2023-12-11

## 2023-12-22 RX ORDER — DOXYCYCLINE HYCLATE 100 MG/1
100 CAPSULE ORAL
COMMUNITY
Start: 2023-12-11 | End: 2023-12-22

## 2023-12-22 RX ORDER — TRAZODONE HYDROCHLORIDE 150 MG/1
TABLET ORAL
COMMUNITY

## 2023-12-22 NOTE — PATIENT INSTRUCTIONS
COPD and Physical Activity  Chronic obstructive pulmonary disease (COPD) is a long-term, or chronic, condition that affects the lungs. COPD is a general term that can be used to describe many problems that cause inflammation of the lungs and limit airflow. These conditions include chronic bronchitis and emphysema.  The main symptom of COPD is shortness of breath, which makes it harder to do even simple tasks. This can also make it harder to exercise and stay active. Talk with your health care provider about treatments to help you breathe better and actions you can take to prevent breathing problems during physical activity.  What are the benefits of exercising when you have COPD?  Exercising regularly is an important part of a healthy lifestyle. You can still exercise and do physical activities even though you have COPD. Exercise and physical activity improve your shortness of breath by increasing blood flow (circulation). This causes your heart to pump more oxygen through your body. Moderate exercise can:  Improve oxygen use.  Increase your energy level.  Help with shortness of breath.  Strengthen your breathing muscles.  Improve heart health.  Help with sleep.  Improve your self-esteem and feelings of self-worth.  Lower depression, stress, and anxiety.  Exercise can benefit everyone with COPD. The severity of your disease may affect how hard you can exercise, especially at first, but everyone can benefit. Talk with your health care provider about how much exercise is safe for you, and which activities and exercises are safe for you.  What actions can I take to prevent breathing problems during physical activity?  Sign up for a pulmonary rehabilitation program. This type of program may include:  Education about lung diseases.  Exercise classes that teach you how to exercise and be more active while improving your breathing. This usually involves:  Exercise using your lower extremities, such as a stationary  bicycle.  About 30 minutes of exercise, 2 to 5 times per week, for 6 to 12 weeks.  Strength training, such as push-ups or leg lifts.  Nutrition education.  Group classes in which you can talk with others who also have COPD and learn ways to manage stress.  If you use an oxygen tank, you should use it while you exercise. Work with your health care provider to adjust your oxygen for your physical activity. Your resting flow rate is different from your flow rate during physical activity.  How to manage your breathing while exercising  While you are exercising:  Take slow breaths.  Pace yourself, and do nottry to go too fast.  Purse your lips while breathing out. Pursing your lips is similar to a kissing or whistling position.  If doing exercise that uses a quick burst of effort, such as weight lifting:  Breathe in before starting the exercise.  Breathe out during the hardest part of the exercise, such as raising the weights.  Where to find support  You can find support for exercising with COPD from:  Your health care provider.  A pulmonary rehabilitation program.  Your local health department or community health programs.  Support groups, either online or in-person. Your health care provider may be able to recommend support groups.  Where to find more information  You can find more information about exercising with COPD from:  American Lung Association: lung.org  COPD Foundation: copdfoundation.org  Contact a health care provider if:  Your symptoms get worse.  You have nausea.  You have a fever.  You want to start a new exercise program or a new activity.  Get help right away if:  You have chest pain.  You cannot breathe.  These symptoms may represent a serious problem that is an emergency. Do not wait to see if the symptoms will go away. Get medical help right away. Call your local emergency services (911 in the [place] Do not drive yourself to the hospital.  Summary  COPD is a general term that can be used to describe  "many different lung problems that cause lung inflammation and limit airflow. This includes chronic bronchitis and emphysema.  Exercise and physical activity improve your shortness of breath by increasing blood flow (circulation). This causes your heart to provide more oxygen to your body.  Contact your health care provider before starting any exercise program or new activity. Ask your health care provider what exercises and activities are safe for you.  This information is not intended to replace advice given to you by your health care provider. Make sure you discuss any questions you have with your health care provider.  Document Revised: 10/26/2021 Document Reviewed: 10/26/2021  [employer] Patient Education © 2023 [employer]  Smoking Tobacco Information, Adult  Smoking tobacco can be harmful to your health. Tobacco contains a toxic colorless chemical called nicotine. Nicotine causes changes in your brain that make you want more and more. This is called addiction. This can make it hard to stop smoking once you start. Tobacco also has other toxic chemicals that can hurt your body and raise your risk of many cancers.  Menthol or \"lite\" tobacco or cigarette brands are not safer than regular brands.  How can smoking tobacco affect me?  Smoking tobacco puts you at risk for:  Cancer. Smoking is most commonly associated with lung cancer, but can also lead to cancer in other parts of the body.  Chronic obstructive pulmonary disease (COPD). This is a long-term lung condition that makes it hard to breathe. It also gets worse over time.  High blood pressure (hypertension), heart disease, stroke, heart attack, and lung infections, such as pneumonia.  Cataracts. This is when the lenses in the eyes become clouded.  Digestive problems. This may include peptic ulcers, heartburn, and gastroesophageal reflux disease (GERD).  Oral health problems, such as gum disease, mouth sores, and tooth loss.  Loss of taste and smell.  Smoking " also affects how you look and smell. Smoking may cause:  Wrinkles.  Yellow or stained teeth, fingers, and fingernails.  Bad breath.  Bad-smelling clothes and hair.  Smoking tobacco can also affect your social life, because:  It may be challenging to find places to smoke when away from home. Many workplaces, restaurants, hotels, and public places are tobacco-free.  Smoking is expensive. This is due to the cost of tobacco and the long-term costs of treating health problems from smoking.  Secondhand smoke may affect those around you. Secondhand smoke can cause lung cancer, breathing problems, and heart disease. Children of smokers have a higher risk for:  Sudden infant death syndrome (SIDS).  Ear infections.  Lung infections.  What actions can I take to prevent health problems?  Quit smoking    Do not start smoking. Quit if you already smoke.  Do not replace cigarette smoking with vaping devices, such as e-cigarettes.  Make a plan to quit smoking and commit to it. Look for programs to help you, and ask your health care provider for recommendations and ideas. Set a date and write down all the reasons you want to quit.  Let your friends and family know you are quitting so they can help and support you. Consider finding friends who also want to quit. It can be easier to quit with someone else, so that you can support each other.  Talk with your health care provider about using nicotine replacement medicines to help you quit. These include gum, lozenges, patches, sprays, or pills.  If you try to quit but return to smoking, stay positive. It is common to slip up when you first quit, so take it one day at a time.  Be prepared for cravings. When you feel the urge to smoke, chew gum or suck on hard candy.  Lifestyle  Stay busy.  Take care of your body. Get plenty of exercise, eat a healthy diet, and drink plenty of water.  Find ways to manage your stress, such as meditation, yoga, exercise, or time spent with friends and  family.  Ask your health care provider about having regular tests (screenings) to check for cancer. This may include blood tests, imaging tests, and other tests.  Where to find support  To get support to quit smoking, consider:  Asking your health care provider for more information and resources.  Joining a support group for people who want to quit smoking in your local community. There are many effective programs that may help you to quit.  Calling the smokefree.gov counselor helpline at 4-281-QUIT-NOW (1-704.394.7640).  Where to find more information  You may find more information about quitting smoking from:  Centers for Disease Control and Prevention: cdc.gov/tobacco  Smokefree.gov: smokefree.gov  American Lung Association: freedomfromsmoking.org  Contact a health care provider if:  You have problems breathing.  Your lips, nose, or fingers turn blue.  You have chest pain.  You are coughing up blood.  You feel like you will faint.  You have other health changes that cause you to worry.  Summary  Smoking tobacco can negatively affect your health, the health of those around you, your finances, and your social life.  Do not start smoking. Quit if you already smoke. If you need help quitting, ask your health care provider.  Consider joining a support group for people in your local community who want to quit smoking. There are many effective programs that may help you to quit.  This information is not intended to replace advice given to you by your health care provider. Make sure you discuss any questions you have with your health care provider.  Document Revised: 12/13/2022 Document Reviewed: 12/13/2022  [employer] Patient Education © 2023 [employer]

## 2023-12-26 PROBLEM — R94.2 ABNORMAL PET SCAN OF LUNG: Status: ACTIVE | Noted: 2023-12-22

## 2023-12-26 NOTE — PRE-PROCEDURE INSTRUCTIONS
"IMPORTANT INSTRUCTIONS - PRE-ADMISSION TESTING  DO NOT EAT OR CHEW anything after midnight the night before your procedure.    You may have CLEAR liquids up to ______ hours prior to ARRIVAL time. NPO AFTER MN EXCEPT SIPS OF WATER TO TAKE LISTED MEDICATIONS BELOW   Take the following medications the morning of your procedure with JUST A SIP OF WATER:  ____ALBUTEROL INHALER IF NEEDED AND BRING WITH YOU, CARVEDILOL, EZETIMIBE, FLONASE NASAL SPRAY IF NEEDED, HYDROCODONE IF NEEDED, _NITRO PATCH (LET STAFF KNOW HAVE ON AND WHERE IT IS LOCATED\", NITRO STAT IF NEEDED AM OF PROCEDURE__________________________________________________________________________________________________________________________________________________________________________________    DO NOT BRING your medications to the hospital with you, UNLESS something has changed since your PRE-Admission Testing appointment.  Hold all vitamins, supplements, and NSAIDS (Non- steroidal anti-inflammatory meds) for one week prior to surgery (you MAY take Tylenol or Acetaminophen).  If you are diabetic, check your blood sugar the morning of your procedure. If it is less than 70 or if you are feeling symptomatic, call the following number for further instructions: [phone]  Use your inhalers/nebulizers as usual, the morning of your procedure. BRING YOUR INHALERS with you.   Bring your CPAP or BIPAP to hospital, ONLY IF YOU WILL BE SPENDING THE NIGHT.   Make sure you have a ride home and have someone who will stay with you the day of your procedure after you go home.  If you have any questions, please call your Pre-Admission Testing Nurse, __JESUS______________ at [phone]__.   Per anesthesia request, do not smoke for 24 hours before your procedure or as instructed by your surgeon.    NO JEWELRY DAY OF PROCEDURE. NO NAIL POLISH UPPER OR LOWER EXTREMITIES.  NO METFORMIN AFTER 6 PM ON 12/28/23  NO SMOKING 24 HOURS PRIOR TO ARRIVAL  ENTRANCE " A, ELEVATOR A, 3RD FLOOR DAY OF SURGERY

## 2023-12-28 ENCOUNTER — TELEPHONE (OUTPATIENT)
Dept: PULMONOLOGY | Facility: CLINIC | Age: 73
End: 2023-12-28
Payer: MEDICARE

## 2023-12-28 NOTE — TELEPHONE ENCOUNTER
Patient is scheduled to have a robotic bronchoscopy tomorrow on 12/9/2023.  Can we please have patient scheduled for a follow-up appointment with me next Thursday/Friday if she is able

## 2023-12-29 ENCOUNTER — ANESTHESIA (OUTPATIENT)
Dept: PERIOP | Facility: HOSPITAL | Age: 73
End: 2023-12-29
Payer: MEDICARE

## 2023-12-29 ENCOUNTER — ANESTHESIA EVENT (OUTPATIENT)
Dept: PERIOP | Facility: HOSPITAL | Age: 73
End: 2023-12-29
Payer: MEDICARE

## 2023-12-29 ENCOUNTER — HOSPITAL ENCOUNTER (OUTPATIENT)
Dept: CT IMAGING | Facility: HOSPITAL | Age: 73
Discharge: HOME | End: 2023-12-29
Payer: MEDICARE

## 2023-12-29 ENCOUNTER — HOSPITAL ENCOUNTER (OUTPATIENT)
Facility: HOSPITAL | Age: 73
Setting detail: HOSPITAL OUTPATIENT SURGERY
Discharge: HOME | End: 2023-12-29
Attending: INTERNAL MEDICINE | Admitting: INTERNAL MEDICINE
Payer: MEDICARE

## 2023-12-29 VITALS
HEART RATE: 71 BPM | TEMPERATURE: 98.5 F | SYSTOLIC BLOOD PRESSURE: 129 MMHG | HEIGHT: 59 IN | BODY MASS INDEX: 24.76 KG/M2 | DIASTOLIC BLOOD PRESSURE: 42 MMHG | RESPIRATION RATE: 20 BRPM | OXYGEN SATURATION: 97 % | WEIGHT: 122.8 LBS

## 2023-12-29 DIAGNOSIS — R94.2 ABNORMAL PET SCAN OF LUNG: ICD-10-CM

## 2023-12-29 DIAGNOSIS — R91.8 LUNG NODULES: ICD-10-CM

## 2023-12-29 LAB
ACB CMPLX DNA BAL NAA+NON-PRB-NCNCRNG: NOT DETECTED
ANION GAP SERPL CALCULATED.3IONS-SCNC: 10.9 MMOL/L (ref 5–15)
BLACTX-M ISLT/SPM QL: NORMAL
BLAIMP ISLT/SPM QL: NORMAL
BLAKPC ISLT/SPM QL: NORMAL
BLAOXA-48-LIKE ISLT/SPM QL: NORMAL
BLAVIM ISLT/SPM QL: NORMAL
BUN SERPL-MCNC: 9 MG/DL (ref 8–23)
BUN/CREAT SERPL: 12.2 (ref 7–25)
C PNEUM DNA NPH QL NAA+NON-PROBE: NOT DETECTED
CALCIUM SPEC-SCNC: 9.3 MG/DL (ref 8.6–10.5)
CHLORIDE SERPL-SCNC: 107 MMOL/L (ref 98–107)
CO2 SERPL-SCNC: 26.1 MMOL/L (ref 22–29)
CREAT SERPL-MCNC: 0.74 MG/DL (ref 0.57–1)
E CLOAC COMP DNA BAL NAA+NON-PRB-NCNCRNG: NOT DETECTED
E COLI DNA BAL NAA+NON-PRB-NCNCRNG: NOT DETECTED
EGFRCR SERPLBLD CKD-EPI 2021: 85.6 ML/MIN/1.73
FLUAV SUBTYP SPEC NAA+PROBE: NOT DETECTED
FLUBV RNA ISLT QL NAA+PROBE: NOT DETECTED
GLUCOSE BLDC GLUCOMTR-MCNC: 105 MG/DL (ref 70–99)
GLUCOSE SERPL-MCNC: 146 MG/DL (ref 65–99)
GP B STREP DNA BAL NAA+NON-PRB-NCNCRNG: NOT DETECTED
HADV DNA SPEC NAA+PROBE: NOT DETECTED
HAEM INFLU DNA BAL NAA+NON-PRB-NCNCRNG: NOT DETECTED
HCOV RNA LOWER RESP QL NAA+NON-PROBE: NOT DETECTED
HMPV RNA NPH QL NAA+NON-PROBE: NOT DETECTED
HPIV RNA LOWER RESP QL NAA+NON-PROBE: NOT DETECTED
K AEROGENES DNA BAL NAA+NON-PRB-NCNCRNG: NOT DETECTED
K OXYTOCA DNA BAL NAA+NON-PRB-NCNCRNG: NOT DETECTED
K PNEU GRP DNA BAL NAA+NON-PRB-NCNCRNG: NOT DETECTED
L PNEUMO DNA LOWER RESP QL NAA+NON-PROBE: NOT DETECTED
M CATARRHALIS DNA BAL NAA+NON-PRB-NCNCRNG: NOT DETECTED
M PNEUMO IGG SER IA-ACNC: NOT DETECTED
MECA+MECC ISLT/SPM QL: NORMAL
NDM GENE: NORMAL
NIGHT BLUE STAIN TISS: NORMAL
P AERUGINOSA DNA BAL NAA+NON-PRB-NCNCRNG: NOT DETECTED
POTASSIUM SERPL-SCNC: 4.2 MMOL/L (ref 3.5–5.2)
PROTEUS SP DNA BAL NAA+NON-PRB-NCNCRNG: NOT DETECTED
QT INTERVAL: 486 MS
QTC INTERVAL: 486 MS
RHINOVIRUS RNA SPEC NAA+PROBE: NOT DETECTED
RSV RNA NPH QL NAA+NON-PROBE: NOT DETECTED
S AUREUS DNA BAL NAA+NON-PRB-NCNCRNG: NOT DETECTED
S MARCESCENS DNA BAL NAA+NON-PRB-NCNCRNG: NOT DETECTED
S PNEUM DNA BAL NAA+NON-PRB-NCNCRNG: NOT DETECTED
S PYO DNA BAL NAA+NON-PRB-NCNCRNG: NOT DETECTED
SODIUM SERPL-SCNC: 144 MMOL/L (ref 136–145)

## 2023-12-29 PROCEDURE — 25010000002 PROPOFOL 10 MG/ML EMULSION: Performed by: NURSE ANESTHETIST, CERTIFIED REGISTERED

## 2023-12-29 PROCEDURE — C1726 CATH, BAL DIL, NON-VASCULAR: HCPCS | Performed by: INTERNAL MEDICINE

## 2023-12-29 PROCEDURE — 87633 RESP VIRUS 12-25 TARGETS: CPT | Performed by: INTERNAL MEDICINE

## 2023-12-29 PROCEDURE — 82948 REAGENT STRIP/BLOOD GLUCOSE: CPT

## 2023-12-29 PROCEDURE — 87071 CULTURE AEROBIC QUANT OTHER: CPT | Performed by: INTERNAL MEDICINE

## 2023-12-29 PROCEDURE — 25010000002 MIDAZOLAM PER 1MG: Performed by: ANESTHESIOLOGY

## 2023-12-29 PROCEDURE — 88108 CYTOPATH CONCENTRATE TECH: CPT | Performed by: INTERNAL MEDICINE

## 2023-12-29 PROCEDURE — 80048 BASIC METABOLIC PNL TOTAL CA: CPT | Performed by: ANESTHESIOLOGY

## 2023-12-29 PROCEDURE — 87205 SMEAR GRAM STAIN: CPT | Performed by: INTERNAL MEDICINE

## 2023-12-29 PROCEDURE — 93005 ELECTROCARDIOGRAM TRACING: CPT | Performed by: ANESTHESIOLOGY

## 2023-12-29 PROCEDURE — 31624 DX BRONCHOSCOPE/LAVAGE: CPT | Performed by: INTERNAL MEDICINE

## 2023-12-29 PROCEDURE — 71250 CT THORAX DX C-: CPT

## 2023-12-29 PROCEDURE — 87116 MYCOBACTERIA CULTURE: CPT | Performed by: INTERNAL MEDICINE

## 2023-12-29 PROCEDURE — 87102 FUNGUS ISOLATION CULTURE: CPT | Performed by: INTERNAL MEDICINE

## 2023-12-29 PROCEDURE — 25810000003 LACTATED RINGERS PER 1000 ML: Performed by: ANESTHESIOLOGY

## 2023-12-29 PROCEDURE — 87206 SMEAR FLUORESCENT/ACID STAI: CPT | Performed by: INTERNAL MEDICINE

## 2023-12-29 RX ORDER — LIDOCAINE HYDROCHLORIDE 20 MG/ML
INJECTION, SOLUTION EPIDURAL; INFILTRATION; INTRACAUDAL; PERINEURAL AS NEEDED
Status: DISCONTINUED | OUTPATIENT
Start: 2023-12-29 | End: 2023-12-29 | Stop reason: SURG

## 2023-12-29 RX ORDER — PROMETHAZINE HYDROCHLORIDE 12.5 MG/1
25 TABLET ORAL ONCE AS NEEDED
Status: DISCONTINUED | OUTPATIENT
Start: 2023-12-29 | End: 2023-12-29 | Stop reason: HOSPADM

## 2023-12-29 RX ORDER — SODIUM CHLORIDE, SODIUM LACTATE, POTASSIUM CHLORIDE, CALCIUM CHLORIDE 600; 310; 30; 20 MG/100ML; MG/100ML; MG/100ML; MG/100ML
9 INJECTION, SOLUTION INTRAVENOUS CONTINUOUS PRN
Status: DISCONTINUED | OUTPATIENT
Start: 2023-12-29 | End: 2023-12-29 | Stop reason: HOSPADM

## 2023-12-29 RX ORDER — OXYCODONE HYDROCHLORIDE 5 MG/1
5 TABLET ORAL
Status: DISCONTINUED | OUTPATIENT
Start: 2023-12-29 | End: 2023-12-29 | Stop reason: HOSPADM

## 2023-12-29 RX ORDER — LIDOCAINE HYDROCHLORIDE 40 MG/ML
INJECTION, SOLUTION RETROBULBAR; TOPICAL AS NEEDED
Status: DISCONTINUED | OUTPATIENT
Start: 2023-12-29 | End: 2023-12-29 | Stop reason: HOSPADM

## 2023-12-29 RX ORDER — MEPERIDINE HYDROCHLORIDE 25 MG/ML
12.5 INJECTION INTRAMUSCULAR; INTRAVENOUS; SUBCUTANEOUS
Status: DISCONTINUED | OUTPATIENT
Start: 2023-12-29 | End: 2023-12-29 | Stop reason: HOSPADM

## 2023-12-29 RX ORDER — ONDANSETRON 2 MG/ML
4 INJECTION INTRAMUSCULAR; INTRAVENOUS ONCE AS NEEDED
Status: DISCONTINUED | OUTPATIENT
Start: 2023-12-29 | End: 2023-12-29 | Stop reason: HOSPADM

## 2023-12-29 RX ORDER — SODIUM CHLORIDE 9 MG/ML
40 INJECTION, SOLUTION INTRAVENOUS AS NEEDED
Status: DISCONTINUED | OUTPATIENT
Start: 2023-12-29 | End: 2023-12-29 | Stop reason: HOSPADM

## 2023-12-29 RX ORDER — MIDAZOLAM HYDROCHLORIDE 2 MG/2ML
0.5 INJECTION, SOLUTION INTRAMUSCULAR; INTRAVENOUS ONCE
Status: COMPLETED | OUTPATIENT
Start: 2023-12-29 | End: 2023-12-29

## 2023-12-29 RX ORDER — PROMETHAZINE HYDROCHLORIDE 25 MG/1
25 SUPPOSITORY RECTAL ONCE AS NEEDED
Status: DISCONTINUED | OUTPATIENT
Start: 2023-12-29 | End: 2023-12-29 | Stop reason: HOSPADM

## 2023-12-29 RX ADMIN — MIDAZOLAM HYDROCHLORIDE 0.5 MG: 1 INJECTION, SOLUTION INTRAMUSCULAR; INTRAVENOUS at 12:06

## 2023-12-29 RX ADMIN — SODIUM CHLORIDE, POTASSIUM CHLORIDE, SODIUM LACTATE AND CALCIUM CHLORIDE: 600; 310; 30; 20 INJECTION, SOLUTION INTRAVENOUS at 12:18

## 2023-12-29 RX ADMIN — PROPOFOL 200 MCG/KG/MIN: 10 INJECTION, EMULSION INTRAVENOUS at 12:32

## 2023-12-29 RX ADMIN — LIDOCAINE HYDROCHLORIDE 100 MG: 20 INJECTION, SOLUTION EPIDURAL; INFILTRATION; INTRACAUDAL; PERINEURAL at 12:31

## 2023-12-29 NOTE — INTERVAL H&P NOTE
Allergies   Allergen Reactions    Morphine Swelling and Rash    Oxycodone-Acetaminophen Shortness Of Breath and Mental Status Change    Statins Myalgia    Sulfa Antibiotics Unknown - Low Severity     REPORTS HAPPENED AS CHILD AND UNSURE OF REACTION      H&P reviewed. The patient was examined and there are no changes to the H&P.

## 2023-12-29 NOTE — ANESTHESIA POSTPROCEDURE EVALUATION
Patient: Thi Lynn    Procedure Summary       Date: 12/29/23 Room / Location: [facility] OR 04 / [facility] MAIN OR    Anesthesia Start: 1222 Anesthesia Stop: 1256    Procedure: BRONCHOSCOPY BIOPSY WITH ANESTHESIA, BAL (Bilateral: Bronchus) Diagnosis:       Abnormal PET scan of lung      (Abnormal PET scan of lung [R94.2])    Surgeons: Shane Tamez DO Provider: Shane Weaver MD    Anesthesia Type: general ASA Status: 4            Anesthesia Type: general    Vitals  Vitals Value Taken Time   /66 12/29/23 1328   Temp 36.6 °C (97.9 °F) 12/29/23 1320   Pulse 75 12/29/23 1320   Resp 24 12/29/23 1320   SpO2 93 % 12/29/23 1320   Vitals shown include unfiled device data.      Post Anesthesia Care and Evaluation    Patient location during evaluation: bedside  Patient participation: complete - patient participated  Level of consciousness: awake  Pain management: adequate    Airway patency: patent  PONV Status: none  Cardiovascular status: acceptable  Respiratory status: acceptable  Hydration status: acceptable    Comments: An Anesthesiologist personally participated in the most demanding procedures (including induction and emergence if applicable) in the anesthesia plan, monitored the course of anesthesia administration at frequent intervals and remained physically present and available for immediate diagnosis and treatment of emergencies.

## 2023-12-29 NOTE — DISCHARGE INSTRUCTIONS
DISCHARGE INSTRUCTIONS  BRONCHOSCOPY/  ENDOBRONCHIAL ULTRASOUND      For your procedure you had:  General Anesthesia (you may have a sore throat for the first 24 hours)    You may experience dizziness, drowsiness, or lightheadedness for several hours following surgery/procedure.  Do not stay alone today or tonight.  Limit your activity for 24 hours.  You should not drive or operate machinery or drink alcohol for 24 hours or while you are taking pain medication.  You should not sign legally binding documents.  Resume your diet slowly.  Follow any special dietary instructions you may have been given by your doctor.  NOTIFY YOUR DOCTOR IF YOU EXPERIENCE ANY OF THE FOLLOWING:  Temperature greater than 101 degrees Fahrenheit  Shaking Chills  Redness or excessive drainage from incision  Nausea, vomiting and/or pain that is not controlled by prescribed medications  Increase in bleeding or bleeding that is excessive  Unable to urinate in 6 hours after surgery  If unable to reach your doctor, please go to the closest Emergency Room     Following your bronchoscopy, you may experience a mild sore throat or cough up small amounts of blood.  Extremes of either should be reported to your doctor.  If you have sudden shortness of breath, sense of urgency, chest pain, chest pressure that worsens over time or sharp chest pains that worsen with deep breaths or coughs go the ED or call 911.  Smokers are encouraged not to smoke for 6 to 8 hours after the procedure to decrease the risk of coughing and bleeding.  Nausea and vomiting can occur, but is not a common side effect of the procedure you have had today. If you experience any nausea or vomiting, take clear liquids for your next meal.  Coughing (slight dry cough to hacking cough) is completely expected for 3 to 4 days.  Pink/ blood tinged sputum is expected for several days ( or while coughing)  Notify MD or go to the ED if significant blood is found in the sputum.  Missing your  appointment/follow-up could lead to serious complications.

## 2023-12-29 NOTE — ANESTHESIA PREPROCEDURE EVALUATION
Anesthesia Evaluation     Patient summary reviewed and Nursing notes reviewed   NPO Solid Status: > 8 hours  NPO Liquid Status: > 4 hours           Airway   Mallampati: II  TM distance: >3 FB  Neck ROM: full  No difficulty expected  Dental    (+) upper dentures and partials    Comment: Removing prior to procedure     Pulmonary    (+) a smoker Current, Smoked day of surgery, COPD moderate, asthma,wheezes  Cardiovascular - normal exam  Exercise tolerance: poor (<4 METS)    Rhythm: regular  Rate: normal    (+) hypertension well controlled, past MI  >12 months, CAD, CABG >6 Months, dysrhythmias Atrial Fib, angina, hyperlipidemia      Neuro/Psych  (+) CVA  GI/Hepatic/Renal/Endo    (+) diabetes mellitus    Musculoskeletal     Abdominal    Substance History      OB/GYN          Other   arthritis,     ROS/Med Hx Other: 11/22/23- pet scan shows area of concern in colon     ECHO 04/21/23:     Left Ventricle Left ventricular systolic function is hyperdynamic (EF > 70%). Calculated left ventricular EF = 74%     Normal left ventricular cavity size noted. Left ventricular wall thickness is consistent with concentric hypertrophy. All left ventricular wall segments contract normally.  Right Ventricle Normal right ventricular cavity size, wall thickness, systolic function and septal motion noted.  Left Atrium. Normal left atrial size and volume noted.  Right Atrium Normal right atrial cavity size noted.  Aortic Valve. No aortic valve regurgitation or stenosis is present. The aortic valve is abnormal in structure. There is calcification of the aortic valve.  Mitral Valve. There is calcification of the mitral valve. Trace mitral valve regurgitation is present. No significant mitral valve stenosis is present.  Tricuspid Valve The tricuspid valve is structurally normal with no significant stenosis present. Mild tricuspid valve regurgitation is present. Estimated right ventricular systolic pressure from tricuspid regurgitation is normal  (<35 mmHg).  Pulmonic Valve. The pulmonic valve is structurally normal with no regurgitation or significant stenosis present.  Greater Vessels.No dilation of the aortic root is present.  Pericardium. The pericardium is normal. There is no evidence of pericardial effusion.     EKG 08/11/21:   SR HR 66, probable left atrial enlargement             Phys Exam Other: Recent congestion and chronic cough- no fever -  Ordered duoneb prior to procedure                Anesthesia Plan    ASA 4     general   total IV anesthesia  intravenous induction     Anesthetic plan, risks, benefits, and alternatives have been provided, discussed and informed consent has been obtained with: patient and child.  Pre-procedure education provided    CODE STATUS:

## 2023-12-30 NOTE — OP NOTE
Procedure name: bronchoscopy with bronchoalveolar lavage     Indication: Fleeting right upper lobe pulmonary nodules     Sedation-IV MAC per anesthesia service     Procedure details:  Patient was brought back to the bronchoscopy suite, a bite block was placed in the oral cavity, and a therapeutic bronchoscope was then used to intubate the trachea. The vocal cords inspected and appeared to have normal motion with inhalation and ventilation.  Inspection was performed of the left tracheobronchial tree and there was no endobronchial lesion seen to the segmental level.  Inspection of the right tracheobronchial tree showed no endobronchial lesions to the segmental level.  A bronchoalveolar lavage was obtained from [right upper lobe        Estimated blood loss:  none     Postoperative diagnosis: Right upper lobe pulmonary nodules    Patient tolerated procedure well,  complications        Plan  Patient is to follow up in my office as scheduled to go over the results of the pathology/cytology and microbiology

## 2023-12-31 LAB
BACTERIA SPEC AEROBE CULT: NORMAL
GRAM STN SPEC: NORMAL

## 2024-01-01 ENCOUNTER — HOSPITAL ENCOUNTER (INPATIENT)
Facility: HOSPITAL | Age: 74
LOS: 1 days | Discharge: HOME | End: 2024-01-02
Attending: FAMILY MEDICINE | Admitting: FAMILY MEDICINE
Payer: MEDICARE

## 2024-01-01 ENCOUNTER — TRANSCRIBE ORDERS (OUTPATIENT)
Dept: CARDIOLOGY | Facility: HOSPITAL | Age: 74
End: 2024-01-01
Payer: MEDICARE

## 2024-01-01 DIAGNOSIS — R07.9 CHEST PAIN, UNSPECIFIED TYPE: Primary | ICD-10-CM

## 2024-01-01 PROBLEM — I48.91 ATRIAL FIBRILLATION WITH RVR: Status: ACTIVE | Noted: 2024-01-01

## 2024-01-01 LAB
ALBUMIN SERPL-MCNC: 3.8 G/DL (ref 3.5–5.2)
ALBUMIN/GLOB SERPL: 1.4 G/DL
ALP SERPL-CCNC: 70 U/L (ref 39–117)
ALT SERPL W P-5'-P-CCNC: 5 U/L (ref 1–33)
ANION GAP SERPL CALCULATED.3IONS-SCNC: 13.1 MMOL/L (ref 5–15)
AST SERPL-CCNC: 10 U/L (ref 1–32)
BASOPHILS # BLD AUTO: 0.03 10*3/MM3 (ref 0–0.2)
BASOPHILS NFR BLD AUTO: 0.3 % (ref 0–1.5)
BILIRUB SERPL-MCNC: 0.3 MG/DL (ref 0–1.2)
BUN SERPL-MCNC: 9 MG/DL (ref 8–23)
BUN/CREAT SERPL: 14.3 (ref 7–25)
CALCIUM SPEC-SCNC: 8.8 MG/DL (ref 8.6–10.5)
CHLORIDE SERPL-SCNC: 105 MMOL/L (ref 98–107)
CO2 SERPL-SCNC: 23.9 MMOL/L (ref 22–29)
CREAT SERPL-MCNC: 0.63 MG/DL (ref 0.57–1)
DEPRECATED RDW RBC AUTO: 41.2 FL (ref 37–54)
EGFRCR SERPLBLD CKD-EPI 2021: 93.8 ML/MIN/1.73
EOSINOPHIL # BLD AUTO: 0.09 10*3/MM3 (ref 0–0.4)
EOSINOPHIL NFR BLD AUTO: 0.8 % (ref 0.3–6.2)
ERYTHROCYTE [DISTWIDTH] IN BLOOD BY AUTOMATED COUNT: 13.2 % (ref 12.3–15.4)
GEN 5 2HR TROPONIN T REFLEX: 8 NG/L
GLOBULIN UR ELPH-MCNC: 2.7 GM/DL
GLUCOSE BLDC GLUCOMTR-MCNC: 126 MG/DL (ref 70–99)
GLUCOSE BLDC GLUCOMTR-MCNC: 130 MG/DL (ref 70–99)
GLUCOSE BLDC GLUCOMTR-MCNC: 131 MG/DL (ref 70–99)
GLUCOSE BLDC GLUCOMTR-MCNC: 135 MG/DL (ref 70–99)
GLUCOSE SERPL-MCNC: 136 MG/DL (ref 65–99)
HCT VFR BLD AUTO: 37.9 % (ref 34–46.6)
HGB BLD-MCNC: 12.6 G/DL (ref 12–15.9)
IMM GRANULOCYTES # BLD AUTO: 0.02 10*3/MM3 (ref 0–0.05)
IMM GRANULOCYTES NFR BLD AUTO: 0.2 % (ref 0–0.5)
LYMPHOCYTES # BLD AUTO: 2.47 10*3/MM3 (ref 0.7–3.1)
LYMPHOCYTES NFR BLD AUTO: 21.6 % (ref 19.6–45.3)
MCH RBC QN AUTO: 28.1 PG (ref 26.6–33)
MCHC RBC AUTO-ENTMCNC: 33.2 G/DL (ref 31.5–35.7)
MCV RBC AUTO: 84.4 FL (ref 79–97)
MONOCYTES # BLD AUTO: 0.73 10*3/MM3 (ref 0.1–0.9)
MONOCYTES NFR BLD AUTO: 6.4 % (ref 5–12)
NEUTROPHILS NFR BLD AUTO: 70.7 % (ref 42.7–76)
NEUTROPHILS NFR BLD AUTO: 8.09 10*3/MM3 (ref 1.7–7)
NRBC BLD AUTO-RTO: 0 /100 WBC (ref 0–0.2)
NT-PROBNP SERPL-MCNC: 701.3 PG/ML (ref 0–900)
PLATELET # BLD AUTO: 401 10*3/MM3 (ref 140–450)
PMV BLD AUTO: 8.9 FL (ref 6–12)
POTASSIUM SERPL-SCNC: 3.8 MMOL/L (ref 3.5–5.2)
PROT SERPL-MCNC: 6.5 G/DL (ref 6–8.5)
RBC # BLD AUTO: 4.49 10*6/MM3 (ref 3.77–5.28)
SODIUM SERPL-SCNC: 142 MMOL/L (ref 136–145)
T4 FREE SERPL-MCNC: 1.19 NG/DL (ref 0.93–1.7)
TROPONIN T DELTA: 1 NG/L
TROPONIN T SERPL HS-MCNC: 7 NG/L
TSH SERPL DL<=0.05 MIU/L-ACNC: 0.94 UIU/ML (ref 0.27–4.2)
WBC NRBC COR # BLD AUTO: 11.43 10*3/MM3 (ref 3.4–10.8)

## 2024-01-01 PROCEDURE — 82948 REAGENT STRIP/BLOOD GLUCOSE: CPT

## 2024-01-01 PROCEDURE — 84443 ASSAY THYROID STIM HORMONE: CPT | Performed by: FAMILY MEDICINE

## 2024-01-01 PROCEDURE — 99222 1ST HOSP IP/OBS MODERATE 55: CPT | Performed by: FAMILY MEDICINE

## 2024-01-01 PROCEDURE — 83880 ASSAY OF NATRIURETIC PEPTIDE: CPT | Performed by: FAMILY MEDICINE

## 2024-01-01 PROCEDURE — 80053 COMPREHEN METABOLIC PANEL: CPT | Performed by: FAMILY MEDICINE

## 2024-01-01 PROCEDURE — 84484 ASSAY OF TROPONIN QUANT: CPT | Performed by: INTERNAL MEDICINE

## 2024-01-01 PROCEDURE — 83036 HEMOGLOBIN GLYCOSYLATED A1C: CPT | Performed by: PHYSICIAN ASSISTANT

## 2024-01-01 PROCEDURE — 99222 1ST HOSP IP/OBS MODERATE 55: CPT | Performed by: INTERNAL MEDICINE

## 2024-01-01 PROCEDURE — 25810000003 SODIUM CHLORIDE 0.9 % SOLUTION: Performed by: FAMILY MEDICINE

## 2024-01-01 PROCEDURE — 84439 ASSAY OF FREE THYROXINE: CPT | Performed by: FAMILY MEDICINE

## 2024-01-01 PROCEDURE — 85025 COMPLETE CBC W/AUTO DIFF WBC: CPT | Performed by: FAMILY MEDICINE

## 2024-01-01 PROCEDURE — 93005 ELECTROCARDIOGRAM TRACING: CPT

## 2024-01-01 RX ORDER — SODIUM CHLORIDE 9 MG/ML
40 INJECTION, SOLUTION INTRAVENOUS AS NEEDED
Status: DISCONTINUED | OUTPATIENT
Start: 2024-01-01 | End: 2024-01-02 | Stop reason: HOSPADM

## 2024-01-01 RX ORDER — SODIUM CHLORIDE 9 MG/ML
100 INJECTION, SOLUTION INTRAVENOUS CONTINUOUS
Status: DISCONTINUED | OUTPATIENT
Start: 2024-01-01 | End: 2024-01-02 | Stop reason: HOSPADM

## 2024-01-01 RX ORDER — POLYETHYLENE GLYCOL 3350 17 G/17G
17 POWDER, FOR SOLUTION ORAL DAILY PRN
Status: DISCONTINUED | OUTPATIENT
Start: 2024-01-01 | End: 2024-01-02 | Stop reason: HOSPADM

## 2024-01-01 RX ORDER — AMOXICILLIN 250 MG
2 CAPSULE ORAL 2 TIMES DAILY
Status: DISCONTINUED | OUTPATIENT
Start: 2024-01-01 | End: 2024-01-02 | Stop reason: HOSPADM

## 2024-01-01 RX ORDER — TRAZODONE HYDROCHLORIDE 50 MG/1
75 TABLET ORAL NIGHTLY
Status: DISCONTINUED | OUTPATIENT
Start: 2024-01-01 | End: 2024-01-02 | Stop reason: HOSPADM

## 2024-01-01 RX ORDER — SODIUM CHLORIDE 0.9 % (FLUSH) 0.9 %
10 SYRINGE (ML) INJECTION AS NEEDED
Status: DISCONTINUED | OUTPATIENT
Start: 2024-01-01 | End: 2024-01-02 | Stop reason: HOSPADM

## 2024-01-01 RX ORDER — ASPIRIN 81 MG/1
81 TABLET ORAL DAILY
Status: DISCONTINUED | OUTPATIENT
Start: 2024-01-01 | End: 2024-01-02 | Stop reason: HOSPADM

## 2024-01-01 RX ORDER — NICOTINE POLACRILEX 4 MG
15 LOZENGE BUCCAL
Status: DISCONTINUED | OUTPATIENT
Start: 2024-01-01 | End: 2024-01-02 | Stop reason: HOSPADM

## 2024-01-01 RX ORDER — IBUPROFEN 600 MG/1
1 TABLET ORAL
Status: DISCONTINUED | OUTPATIENT
Start: 2024-01-01 | End: 2024-01-02 | Stop reason: HOSPADM

## 2024-01-01 RX ORDER — SODIUM CHLORIDE 0.9 % (FLUSH) 0.9 %
10 SYRINGE (ML) INJECTION EVERY 12 HOURS SCHEDULED
Status: DISCONTINUED | OUTPATIENT
Start: 2024-01-01 | End: 2024-01-02 | Stop reason: HOSPADM

## 2024-01-01 RX ORDER — INSULIN LISPRO 100 [IU]/ML
2-7 INJECTION, SOLUTION INTRAVENOUS; SUBCUTANEOUS
Status: DISCONTINUED | OUTPATIENT
Start: 2024-01-01 | End: 2024-01-02 | Stop reason: HOSPADM

## 2024-01-01 RX ORDER — BISACODYL 5 MG/1
5 TABLET, DELAYED RELEASE ORAL DAILY PRN
Status: DISCONTINUED | OUTPATIENT
Start: 2024-01-01 | End: 2024-01-02 | Stop reason: HOSPADM

## 2024-01-01 RX ORDER — NITROGLYCERIN 0.4 MG/1
0.4 TABLET SUBLINGUAL
Status: DISCONTINUED | OUTPATIENT
Start: 2024-01-01 | End: 2024-01-02 | Stop reason: HOSPADM

## 2024-01-01 RX ORDER — DEXTROSE MONOHYDRATE 25 G/50ML
25 INJECTION, SOLUTION INTRAVENOUS
Status: DISCONTINUED | OUTPATIENT
Start: 2024-01-01 | End: 2024-01-02 | Stop reason: HOSPADM

## 2024-01-01 RX ORDER — BISACODYL 10 MG
10 SUPPOSITORY, RECTAL RECTAL DAILY PRN
Status: DISCONTINUED | OUTPATIENT
Start: 2024-01-01 | End: 2024-01-02 | Stop reason: HOSPADM

## 2024-01-01 RX ADMIN — DOCUSATE SODIUM 50MG AND SENNOSIDES 8.6MG 2 TABLET: 8.6; 5 TABLET, FILM COATED ORAL at 01:26

## 2024-01-01 RX ADMIN — APIXABAN 5 MG: 5 TABLET, FILM COATED ORAL at 14:28

## 2024-01-01 RX ADMIN — Medication 10 ML: at 09:08

## 2024-01-01 RX ADMIN — SODIUM CHLORIDE 100 ML/HR: 9 INJECTION, SOLUTION INTRAVENOUS at 21:43

## 2024-01-01 RX ADMIN — Medication 10 ML: at 02:53

## 2024-01-01 RX ADMIN — SODIUM CHLORIDE 100 ML/HR: 9 INJECTION, SOLUTION INTRAVENOUS at 02:53

## 2024-01-01 RX ADMIN — ASPIRIN 81 MG: 81 TABLET, COATED ORAL at 14:28

## 2024-01-01 RX ADMIN — APIXABAN 5 MG: 5 TABLET, FILM COATED ORAL at 20:54

## 2024-01-01 RX ADMIN — TRAZODONE HYDROCHLORIDE 75 MG: 50 TABLET ORAL at 21:34

## 2024-01-01 RX ADMIN — Medication 10 ML: at 20:54

## 2024-01-01 NOTE — CONSULTS
[facility]   Cardiology Consult Note    Patient Name: Thi Lynn  : 1950  MRN: [id number]  Primary Care Physician:  Amanda Mike APRN  Referring Physician: No Known Provider  Date of admission: 2024    Subjective   Subjective     Reason for Consult/ Chief Complaint: Syncope and palpitations    HPI:  Thi Lynn is a 73 y.o. female with past medical history significant for coronary artery disease status post CABG in .  She also has a history of COPD, hypertension and hyperlipidemia.  She has remote stroke and diabetes.  She states she had a bronchoscopy done on the .  Yesterday she was shopping and walking through [place].  She got a choking sensation up in her throat.  She states she had this in the past but it has been several years.  She started to break out in a sweat and felt her heart racing.  The next thing she knows she was waking up on a chair.  She did lose consciousness just for a short period of time.  She went to an outside hospital and was diagnosed with atrial fibrillation with controlled ventricular rate which is something new.    Review of Systems   All systems were reviewed and negative except for: Syncope, palpitation    Personal History     Past Medical History:   Diagnosis Date    Abnormal positron emission tomography (PET) scan     Asthma     Chest pain     FOLLOWED BY DR HEIN/ DOLORES COONEY. DENIES CP/SOA.  DECREASED ACTIVITY D/T BILATERAL LEG PAIN    COPD (chronic obstructive pulmonary disease)     Coronary artery disease     Diabetes mellitus     REPORTS DOES NOT HAVE WORKING GLUCOMETER  AT THIS TIME    Elevated cholesterol     Hyperlipemia     Hypertension     Limb swelling     Lung nodules     Myocardial infarction     REPORTS HAD 3 VESSEL BYPASS    Pain management     Seasonal allergies     Stomach disorder     Stroke     ABOUT 3-4 YEARS AGO        Past medical history reviewed      Family History: family history includes Cancer in her father; Colon  polyps in her mother; Heart disease in her brother and father. Otherwise pertinent FHx was reviewed and not pertinent to current issue.    Social History:  reports that she has been smoking cigarettes. She started smoking about 53 years ago. She has a 26.00 pack-year smoking history. She has been exposed to tobacco smoke. She has never used smokeless tobacco. She reports that she does not drink alcohol and does not use drugs.    Home Medications:  Evolocumab, HYDROcodone-acetaminophen, albuterol sulfate HFA, amLODIPine, carvedilol, clopidogrel, ezetimibe, metFORMIN ER, nitroglycerin, pantoprazole, and traZODone    Allergies:  Allergies   Allergen Reactions    Morphine Swelling and Rash    Oxycodone-Acetaminophen Shortness Of Breath and Mental Status Change    Statins Myalgia    Sulfa Antibiotics Unknown - Low Severity     REPORTS HAPPENED AS CHILD AND UNSURE OF REACTION       Objective    Objective     Vitals:   Temp:  [98.1 °F (36.7 °C)-98.7 °F (37.1 °C)] 98.2 °F (36.8 °C)  Heart Rate:  [50-73] 50  Resp:  [18] 18  BP: (110-128)/(40-51) 128/51      Physical Exam:   Constitutional: Awake, alert, No acute distress    Eyes: PERRLA, sclerae anicteric, no conjunctival injection   HENT: NCAT, mucous membranes moist   Neck: Supple, no thyromegaly, no lymphadenopathy, trachea midline   Respiratory: Clear to auscultation bilaterally, nonlabored respirations    Cardiovascular: RRR, no murmurs, rubs, or gallops, palpable pedal pulses bilaterally   Gastrointestinal: Positive bowel sounds, soft, nontender, nondistended   Musculoskeletal: No bilateral ankle edema, no clubbing or cyanosis to extremities   Psychiatric: Appropriate affect, cooperative   Neurologic: Oriented x 3, strength symmetric in all extremities, Cranial Nerves grossly intact to confrontation, speech clear   Skin: No rashes     Result Review    Result Review:  I have personally reviewed the results from the time of this admission to 1/1/2024 13:01 EST and agree  with these findings:  [x]  Laboratory  []  Microbiology  [x]  Radiology  [x]  EKG/Telemetry   [x]  Cardiology/Vascular   []  Pathology  [x]  Old records  []  Other:  Most notable findings include:     CMP          5/3/2023    11:31 12/29/2023    08:45 1/1/2024    02:09   CMP   Glucose 110  146  136    BUN 10  9  9    Creatinine 0.57  0.74  0.63    EGFR 96.7  85.6  93.8    Sodium 139  144  142    Potassium 3.9  4.2  3.8    Chloride 105  107  105    Calcium 8.8  9.3  8.8    Total Protein   6.5    Albumin   3.8    Globulin   2.7    Total Bilirubin   0.3    Alkaline Phosphatase   70    AST (SGOT)   10    ALT (SGPT)   5    Albumin/Globulin Ratio   1.4    BUN/Creatinine Ratio 17.5  12.2  14.3    Anion Gap 8.6  10.9  13.1       CBC          5/3/2023    11:31 1/1/2024    02:09   CBC   WBC 10.40  11.43    RBC 4.31  4.49    Hemoglobin 12.7  12.6    Hematocrit 36.3  37.9    MCV 84.2  84.4    MCH 29.5  28.1    MCHC 35.0  33.2    RDW 12.8  13.2    Platelets 348  401       Lab Results   Component Value Date    TROPONINT 7 01/01/2024         Assessment & Plan   Assessment / Plan     Brief Patient Summary:  Thi Lynn is a 73 y.o. female who has a history of coronary artery disease, hypertension, tobacco abuse and COPD, CVA and diabetes who presented with a syncopal episode and was found to have new onset atrial fibrillation with controlled ventricular rate.    Active Hospital Problems:  Active Hospital Problems    Diagnosis     **Atrial fibrillation with RVR        Assessment:  1.  Syncope  2.  Newly diagnosed atrial fibrillation with controlled ventricular rate  3.  Coronary artery disease    Plan:   1.  Patient's PCV2GX9-OHOx score is very high.  We will start Eliquis 5 mg p.o. twice daily.  I would not add back her Plavix.  With her coronary artery disease I am going to put her on low-dose aspirin.  2.  Patient's ventricular rate is well-controlled.  I looked at her telemetry and it shows atrial fibrillation but nothing  that would explain a syncopal episode.  3.  Patient has an echocardiogram ordered.  4.  Patient had left heart cath 5/3/2023 that showed 2 out of 2 patent grafts and no significant disease.  5.  We will see what the echocardiogram shows and watch her on telemetry for another 24 hours.  If it shows nothing I do think we could possibly discharge her at that time and if she has further episodes we would consider placing an event recorder.    Electronically signed by Jason Nichols MD, 01/01/24, 1:01 PM EST.
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
## 2024-01-01 NOTE — PROGRESS NOTES
[facility]   Hospitalist Progress Note       Patient Name: Thi Lynn  : 1950  MRN: [id number]  Primary Care Physician: Amanda Mike APRN  Date of admission: 2024  Today's Date: 2024  Room / Bed:   [facility]/  Subjective   Chief Complaint: Syncope    Summary: Admitted earlier today.  See admission note.  Basically, patient was at [employer] with some mild vagal symptoms including nausea, cramping, sweating, lightheadedness.  Better initially, but symptoms recurred when she was at [facility] later that morning.  She passed out in the restroom but did not sustain any trauma due to people attending to her.  Brought to the ED in [place], then to our hospital for her cardiologist to consult.  Newly diagnosed A-fib noted.    Interval Followup: 2024    Feels well currently. No lightheadedness or CP.  Asking about going home.  She has been up ambulating in the room without any lightheadedness.  She no longer has any crampy nausea vomiting diaphoresis  A-fib on monitor, rate 50s bpm  /40 -128/51      REVIEW OF SYSTEMS:   Syncope  Objective   Temp:  [98.1 °F (36.7 °C)-98.7 °F (37.1 °C)] 98.2 °F (36.8 °C)  Heart Rate:  [50-73] 50  Resp:  [18] 18  BP: (110-128)/(40-51) 128/51  PHYSICAL EXAM   CON: WN. WD. NAD.   NECK:  No thyromegaly. No stridor.   RESP:  CTA. No wheezes. No crackles.  No work of breathing or tachypnea.   CV:  Rhythm regular. Rate WNL. No murmur noted.  No edema.  GI:  Soft and nontender. Nondistended.    EXT: Peripheral pulses intact.  No cyanosis.  PSYCH:  Alert. Oriented. Normal affect and mood.  NEURO:  No dysarthria or aphasia. No unilateral weakness or paresthesia.  SKIN: No chronic venous stasis changes or varicosities.  No cellulitis  Results from last 7 days   Lab Units 24  0209   WBC 10*3/mm3 11.43*   HEMOGLOBIN g/dL 12.6   HEMATOCRIT % 37.9   PLATELETS 10*3/mm3 401     Results from last 7 days   Lab Units 24  0209 23  0845   SODIUM mmol/L 142 144    POTASSIUM mmol/L 3.8 4.2   CO2 mmol/L 23.9 26.1   CHLORIDE mmol/L 105 107   ANION GAP mmol/L 13.1 10.9   BUN mg/dL 9 9   CREATININE mg/dL 0.63 0.74   GLUCOSE mg/dL 136* 146*         COMPLEXITY OF DATA / DECISION MAKING     []  Moderate: One acute illness or mild exacerbation of chronic or 2 stable chronic or tx side effects   []  High:  Severe acute illness or severe exacerbation of chronic - potential for major debility / life threatening         I have personally reviewed the results from the time of this admission to 1/1/2024 13:18 EST:  []  Laboratory:  []  Microbiology: []  Radiology:  []  Telemetry:   []  Cardiology/Vascular:  []  Pathology:  []  Prior external records:  []  Independent historian provided additional details:      []  Discussed case with specialists:    []  Independent interpretation of ECG/Imaging etc:             []  Moderate: Rx management, low risk surgery, suboptimal social situation   []  High:  Rx with close monitoring for toxicity, mod-high risk surgery, DNR decision, Comfort initiated, IV pain meds    Assessment / Plan   Assessment:    Syncope  New onset/diagnosis of atrial fibrillation ChadVASc = 6-7 range  CAD/CABG  DM  HTN  Hx CVA  Dyslipidemia  COPD  Tobacco use     Plan:    Monitored bed  Cardiology consulted  Eliquis started (home Plavix changed to ASA 81 per cards)  IV fluids initiated  Check orthostatic BP  SSI  Check A1c  Echo pending    Discussed plan with RN.  DVT prophylaxis:  Medical DVT prophylaxis orders are present.  CODE STATUS:      Code Status (Patient has no pulse and is not breathing): CPR (Attempt to Resuscitate)  Medical Interventions (Patient has pulse or is breathing): Full Support       Electronically signed by SILVESTRE Maldonado, 01/01/24, 1:18 PM EST.

## 2024-01-01 NOTE — SIGNIFICANT NOTE
01/01/24 1352   Living Environment   People in Home child(loc), adult;grandchild(loc);spouse   Current Living Arrangements home   Potentially Unsafe Housing Conditions none   In the past 12 months has the electric, gas, oil, or water company threatened to shut off services in your home? No   Primary Care Provided by self   Provides Primary Care For no one   Family Caregiver if Needed none   Quality of Family Relationships supportive   Able to Return to Prior Arrangements yes   Resource/Environmental Concerns   Resource/Environmental Concerns none   Transportation Concerns none   Transition Planning   Patient/Family Anticipates Transition to home with family   Patient/Family Anticipated Services at Transition none   Transportation Anticipated car, drives self;family or friend will provide   Discharge Needs Assessment   Readmission Within the Last 30 Days current reason for admission unrelated to previous admission   Current Outpatient/Agency/Support Group other (see comments)  (none)   Equipment Currently Used at Home cane, straight;walker, rolling;wheelchair   Concerns to be Addressed no discharge needs identified   Anticipated Changes Related to Illness other (see comments)  (taking care of self)   Equipment Needed After Discharge none   Outpatient/Agency/Support Group Needs other (see comments)  (None)   Discharge Coordination/Progress SW met with pt this date to complete discharge needs assessment. Pt reports that she lives at home with her spouse, son and grandchild. Pt reports that she plans to transition back home at discharge this date and does not feel she needs home health or rehab at this time. Pt reports that she uses [facility] for her pharmacy and her PCP is ILENE Jacobs. She reports that she does not need any DME at this time as she has a cane, walker and wheelchair at home that she can utilize if needed. Pt states she is able to drive herself to appointments unless it is long distance and  her family can help with transportation as needed.

## 2024-01-01 NOTE — PLAN OF CARE
Problem: Adult Inpatient Plan of Care  Goal: Plan of Care Review  Outcome: Ongoing, Progressing  Flowsheets (Taken 1/1/2024 0602)  Progress: improving  Plan of Care Reviewed With: patient  Outcome Evaluation: Patient came in as a direct admit during shift. Patient alert and oriented x 4. Patient remains on room air. No complaints of pain during shift. IV fluids administered per orders. EKG completed, see results. Continuing with plan of care.   Goal Outcome Evaluation:  Plan of Care Reviewed With: patient        Progress: improving  Outcome Evaluation: Patient came in as a direct admit during shift. Patient alert and oriented x 4. Patient remains on room air. No complaints of pain during shift. IV fluids administered per orders. EKG completed, see results. Continuing with plan of care.

## 2024-01-02 ENCOUNTER — APPOINTMENT (OUTPATIENT)
Dept: CARDIOLOGY | Facility: HOSPITAL | Age: 74
End: 2024-01-02
Payer: MEDICARE

## 2024-01-02 ENCOUNTER — READMISSION MANAGEMENT (OUTPATIENT)
Dept: CALL CENTER | Facility: HOSPITAL | Age: 74
End: 2024-01-02
Payer: MEDICARE

## 2024-01-02 VITALS
DIASTOLIC BLOOD PRESSURE: 42 MMHG | BODY MASS INDEX: 24.09 KG/M2 | SYSTOLIC BLOOD PRESSURE: 129 MMHG | RESPIRATION RATE: 18 BRPM | OXYGEN SATURATION: 92 % | HEART RATE: 77 BPM | TEMPERATURE: 97.3 F | HEIGHT: 59 IN | WEIGHT: 119.49 LBS

## 2024-01-02 LAB
ANION GAP SERPL CALCULATED.3IONS-SCNC: 9.2 MMOL/L (ref 5–15)
BASOPHILS # BLD AUTO: 0.02 10*3/MM3 (ref 0–0.2)
BASOPHILS NFR BLD AUTO: 0.2 % (ref 0–1.5)
BH CV ECHO MEAS - ACS: 1.6 CM
BH CV ECHO MEAS - AO MAX PG: 9 MMHG
BH CV ECHO MEAS - AO MEAN PG: 5 MMHG
BH CV ECHO MEAS - AO ROOT DIAM: 2.6 CM
BH CV ECHO MEAS - AO V2 MAX: 149 CM/SEC
BH CV ECHO MEAS - AO V2 VTI: 36.2 CM
BH CV ECHO MEAS - AVA(I,D): 2.3 CM2
BH CV ECHO MEAS - EDV(CUBED): 74.1 ML
BH CV ECHO MEAS - EDV(MOD-SP2): 45.4 ML
BH CV ECHO MEAS - EDV(MOD-SP4): 34 ML
BH CV ECHO MEAS - EF(MOD-BP): 59 %
BH CV ECHO MEAS - EF(MOD-SP2): 57.7 %
BH CV ECHO MEAS - EF(MOD-SP4): 59.1 %
BH CV ECHO MEAS - ESV(CUBED): 27 ML
BH CV ECHO MEAS - ESV(MOD-SP2): 19.2 ML
BH CV ECHO MEAS - ESV(MOD-SP4): 13.9 ML
BH CV ECHO MEAS - FS: 28.6 %
BH CV ECHO MEAS - IVS/LVPW: 0.9 CM
BH CV ECHO MEAS - IVSD: 1.2 CM
BH CV ECHO MEAS - LA DIMENSION: 3.7 CM
BH CV ECHO MEAS - LAT PEAK E' VEL: 14.5 CM/SEC
BH CV ECHO MEAS - LV DIASTOLIC VOL/BSA (35-75): 23 CM2
BH CV ECHO MEAS - LV MASS(C)D: 127.8 GRAMS
BH CV ECHO MEAS - LV MAX PG: 3.9 MMHG
BH CV ECHO MEAS - LV MEAN PG: 2 MMHG
BH CV ECHO MEAS - LV SYSTOLIC VOL/BSA (12-30): 9.4 CM2
BH CV ECHO MEAS - LV V1 MAX: 98.5 CM/SEC
BH CV ECHO MEAS - LV V1 VTI: 26.5 CM
BH CV ECHO MEAS - LVIDD: 4.2 CM
BH CV ECHO MEAS - LVIDS: 3 CM
BH CV ECHO MEAS - LVOT AREA: 3.1 CM2
BH CV ECHO MEAS - LVOT DIAM: 2 CM
BH CV ECHO MEAS - LVPWD: 1.1 CM
BH CV ECHO MEAS - MED PEAK E' VEL: 11.2 CM/SEC
BH CV ECHO MEAS - MV A MAX VEL: 51.9 CM/SEC
BH CV ECHO MEAS - MV DEC TIME: 0.18 SEC
BH CV ECHO MEAS - MV E MAX VEL: 128 CM/SEC
BH CV ECHO MEAS - MV E/A: 2.47
BH CV ECHO MEAS - RAP SYSTOLE: 3 MMHG
BH CV ECHO MEAS - RVSP: 27 MMHG
BH CV ECHO MEAS - SI(MOD-SP2): 17.7 ML/M2
BH CV ECHO MEAS - SI(MOD-SP4): 13.6 ML/M2
BH CV ECHO MEAS - SV(LVOT): 83.3 ML
BH CV ECHO MEAS - SV(MOD-SP2): 26.2 ML
BH CV ECHO MEAS - SV(MOD-SP4): 20.1 ML
BH CV ECHO MEAS - TAPSE (>1.6): 1.98 CM
BH CV ECHO MEAS - TR MAX PG: 24 MMHG
BH CV ECHO MEAS - TR MAX VEL: 245 CM/SEC
BH CV ECHO MEASUREMENTS AVERAGE E/E' RATIO: 9.96
BUN SERPL-MCNC: 4 MG/DL (ref 8–23)
BUN/CREAT SERPL: 8 (ref 7–25)
CALCIUM SPEC-SCNC: 7.9 MG/DL (ref 8.6–10.5)
CHLORIDE SERPL-SCNC: 112 MMOL/L (ref 98–107)
CO2 SERPL-SCNC: 19.8 MMOL/L (ref 22–29)
CREAT SERPL-MCNC: 0.5 MG/DL (ref 0.57–1)
DEPRECATED RDW RBC AUTO: 42.4 FL (ref 37–54)
EGFRCR SERPLBLD CKD-EPI 2021: 99.2 ML/MIN/1.73
EOSINOPHIL # BLD AUTO: 0.19 10*3/MM3 (ref 0–0.4)
EOSINOPHIL NFR BLD AUTO: 2.3 % (ref 0.3–6.2)
ERYTHROCYTE [DISTWIDTH] IN BLOOD BY AUTOMATED COUNT: 13.5 % (ref 12.3–15.4)
GLUCOSE BLDC GLUCOMTR-MCNC: 124 MG/DL (ref 70–99)
GLUCOSE BLDC GLUCOMTR-MCNC: 124 MG/DL (ref 70–99)
GLUCOSE SERPL-MCNC: 138 MG/DL (ref 65–99)
HBA1C MFR BLD: 6.5 % (ref 4.8–5.6)
HCT VFR BLD AUTO: 33.5 % (ref 34–46.6)
HGB BLD-MCNC: 10.9 G/DL (ref 12–15.9)
IMM GRANULOCYTES # BLD AUTO: 0.02 10*3/MM3 (ref 0–0.05)
IMM GRANULOCYTES NFR BLD AUTO: 0.2 % (ref 0–0.5)
LEFT ATRIUM VOLUME INDEX: 27.2 ML/M2
LYMPHOCYTES # BLD AUTO: 2.15 10*3/MM3 (ref 0.7–3.1)
LYMPHOCYTES NFR BLD AUTO: 25.7 % (ref 19.6–45.3)
MAGNESIUM SERPL-MCNC: 2.1 MG/DL (ref 1.6–2.4)
MCH RBC QN AUTO: 28 PG (ref 26.6–33)
MCHC RBC AUTO-ENTMCNC: 32.5 G/DL (ref 31.5–35.7)
MCV RBC AUTO: 86.1 FL (ref 79–97)
MONOCYTES # BLD AUTO: 0.57 10*3/MM3 (ref 0.1–0.9)
MONOCYTES NFR BLD AUTO: 6.8 % (ref 5–12)
NEUTROPHILS NFR BLD AUTO: 5.43 10*3/MM3 (ref 1.7–7)
NEUTROPHILS NFR BLD AUTO: 64.8 % (ref 42.7–76)
NRBC BLD AUTO-RTO: 0 /100 WBC (ref 0–0.2)
PHOSPHATE SERPL-MCNC: 2.1 MG/DL (ref 2.5–4.5)
PLATELET # BLD AUTO: 373 10*3/MM3 (ref 140–450)
PMV BLD AUTO: 9.3 FL (ref 6–12)
POTASSIUM SERPL-SCNC: 3.9 MMOL/L (ref 3.5–5.2)
QT INTERVAL: 471 MS
QTC INTERVAL: 516 MS
RBC # BLD AUTO: 3.89 10*6/MM3 (ref 3.77–5.28)
SODIUM SERPL-SCNC: 141 MMOL/L (ref 136–145)
WBC NRBC COR # BLD AUTO: 8.38 10*3/MM3 (ref 3.4–10.8)

## 2024-01-02 PROCEDURE — 84100 ASSAY OF PHOSPHORUS: CPT | Performed by: PHYSICIAN ASSISTANT

## 2024-01-02 PROCEDURE — 80048 BASIC METABOLIC PNL TOTAL CA: CPT | Performed by: PHYSICIAN ASSISTANT

## 2024-01-02 PROCEDURE — 99239 HOSP IP/OBS DSCHRG MGMT >30: CPT | Performed by: FAMILY MEDICINE

## 2024-01-02 PROCEDURE — 82948 REAGENT STRIP/BLOOD GLUCOSE: CPT

## 2024-01-02 PROCEDURE — 93306 TTE W/DOPPLER COMPLETE: CPT

## 2024-01-02 PROCEDURE — 83735 ASSAY OF MAGNESIUM: CPT | Performed by: PHYSICIAN ASSISTANT

## 2024-01-02 PROCEDURE — 25810000003 SODIUM CHLORIDE 0.9 % SOLUTION: Performed by: FAMILY MEDICINE

## 2024-01-02 PROCEDURE — 93306 TTE W/DOPPLER COMPLETE: CPT | Performed by: INTERNAL MEDICINE

## 2024-01-02 PROCEDURE — 99233 SBSQ HOSP IP/OBS HIGH 50: CPT | Performed by: INTERNAL MEDICINE

## 2024-01-02 PROCEDURE — 85025 COMPLETE CBC W/AUTO DIFF WBC: CPT | Performed by: PHYSICIAN ASSISTANT

## 2024-01-02 RX ORDER — CARVEDILOL 3.12 MG/1
1.56 TABLET ORAL EVERY 12 HOURS SCHEDULED
Qty: 30 TABLET | Refills: 0 | Status: SHIPPED | OUTPATIENT
Start: 2024-01-02 | End: 2024-02-01

## 2024-01-02 RX ORDER — ASPIRIN 81 MG/1
81 TABLET ORAL DAILY
Qty: 30 TABLET | Refills: 0 | Status: SHIPPED | OUTPATIENT
Start: 2024-01-03 | End: 2024-02-02

## 2024-01-02 RX ORDER — CARVEDILOL 3.12 MG/1
1.56 TABLET ORAL EVERY 12 HOURS SCHEDULED
Status: DISCONTINUED | OUTPATIENT
Start: 2024-01-02 | End: 2024-01-02 | Stop reason: HOSPADM

## 2024-01-02 RX ADMIN — CARVEDILOL 1.56 MG: 3.12 TABLET, FILM COATED ORAL at 12:14

## 2024-01-02 RX ADMIN — Medication 10 ML: at 09:51

## 2024-01-02 RX ADMIN — APIXABAN 5 MG: 5 TABLET, FILM COATED ORAL at 09:51

## 2024-01-02 RX ADMIN — SODIUM CHLORIDE 100 ML/HR: 9 INJECTION, SOLUTION INTRAVENOUS at 09:51

## 2024-01-02 RX ADMIN — ASPIRIN 81 MG: 81 TABLET, COATED ORAL at 09:51

## 2024-01-02 NOTE — PLAN OF CARE
Goal Outcome Evaluation:           Progress: improving  Outcome Evaluation: VSS. Rested well. No complaints this shift. Will continue to monitor.
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
## 2024-01-02 NOTE — PROGRESS NOTES
[facility]     Cardiology Progress Note    Patient Name: Thi Lynn  : 1950  MRN: [id number]  Primary Care Physician:  Amanda Mike, ILENE  Date of admission: 2024    Subjective   Subjective   Chief complaint  Syncope, atrial fibrillation    HPI:  Patient Reports overall she is doing well.  She notes no presyncope versus syncope.  She notes no palpitations.    Review of Systems   All systems were reviewed and negative except for: Neck review of systems negative.    Objective   Objective     Vitals:   Temp:  [97.7 °F (36.5 °C)-98.8 °F (37.1 °C)] 97.7 °F (36.5 °C)  Heart Rate:  [50-78] 57  Resp:  [18] 18  BP: (105-138)/(30-70) 119/48  Physical Exam   Neck: no JVD, no bruit  Lungs: clear to ausculation bilaterally.  No crackles or wheezes  CV: regular rate and rhythm, no murmur  Ext: no cyanosis, clubbing or edema.  Normal bilateral LE pulses.      Scheduled Meds:apixaban, 5 mg, Oral, Q12H  aspirin, 81 mg, Oral, Daily  insulin lispro, 2-7 Units, Subcutaneous, 4x Daily AC & at Bedtime  senna-docusate sodium, 2 tablet, Oral, BID  sodium chloride, 10 mL, Intravenous, Q12H  traZODone, 75 mg, Oral, Nightly      Continuous Infusions:sodium chloride, 100 mL/hr, Last Rate: 100 mL/hr (24 0951)           Result Review    Result Review:  I have personally reviewed the results from the time of this admission to 2024 10:30 EST and agree with these findings:  [x]  Laboratory  []  Microbiology  [x]  Radiology  [x]  EKG/Telemetry   [x]  Cardiology/Vascular   []  Pathology  []  Old records  []  Other:  Most notable findings include:     CBC          5/3/2023    11:31 2024    02:09 2024    05:03   CBC   WBC 10.40  11.43  8.38    RBC 4.31  4.49  3.89    Hemoglobin 12.7  12.6  10.9    Hematocrit 36.3  37.9  33.5    MCV 84.2  84.4  86.1    MCH 29.5  28.1  28.0    MCHC 35.0  33.2  32.5    RDW 12.8  13.2  13.5    Platelets 348  401  373      CMP          2023    08:45 2024    02:09 2024     05:03   CMP   Glucose 146  136  138    BUN 9  9  4    Creatinine 0.74  0.63  0.50    EGFR 85.6  93.8  99.2    Sodium 144  142  141    Potassium 4.2  3.8  3.9    Chloride 107  105  112    Calcium 9.3  8.8  7.9    Total Protein  6.5     Albumin  3.8     Globulin  2.7     Total Bilirubin  0.3     Alkaline Phosphatase  70     AST (SGOT)  10     ALT (SGPT)  5     Albumin/Globulin Ratio  1.4     BUN/Creatinine Ratio 12.2  14.3  8.0    Anion Gap 10.9  13.1  9.2       CARDIAC LABS:      Lab 01/01/24  1351 01/01/24  1208 01/01/24  0209   PROBNP  --   --  701.3   HSTROP T 8 7  --         Assessment & Plan   Assessment / Plan     Brief Patient Summary:  Thi Lynn is a 73 y.o. female who has a history of coronary artery disease, hypertension, tobacco abuse and COPD, CVA and diabetes who presented with a syncopal episode and was found to have new onset atrial fibrillation with controlled ventricular rate.     Active Hospital Problems:  Active Hospital Problems    Diagnosis     **Atrial fibrillation with RVR        Assessment:  1.  Syncope  2.  Newly diagnosed atrial fibrillation with controlled ventricular rate  3.  Coronary artery disease     Plan:   1.  Patient's [id number] score is very high.  We will start Eliquis 5 mg p.o. twice daily.  I would not add back her Plavix.  I would have her take an 81 mg aspirin.  2.  Patient's ventricular rate is well-controlled.  I looked at her telemetry and it shows atrial fibrillation but nothing that would explain a syncopal episode.  She does have a couple approximately 2-second pauses.  With her blood pressure also on the low end I would switch her Coreg to 3.125 mg p.o. twice daily.  If her blood pressure continues to run on the low and we could consider decreasing her amlodipine as an outpatient.  3.  Reviewed patient's echocardiogram which shows normal ejection fraction and no significant valvular disease.  4.  Patient had left heart cath 5/3/2023 that showed 2 out of 2  patent grafts and no significant disease.  5.  Feel patient can be discharged from a cardiac standpoint.  Would set her up to see us in clinic in 4 weeks.  I told her if she had any further episodes to give us a call.  It sounds like these episodes have been going on for a while and are likely consistent with vasovagal type episodes.  If she did have more episodes we would consider an event recorder.  Will sign off, call with any questions.    CODE STATUS:   Code Status (Patient has no pulse and is not breathing): CPR (Attempt to Resuscitate)  Medical Interventions (Patient has pulse or is breathing): Full Support      Electronically signed by Jason Nichols MD, 01/02/24, 10:30 AM EST.

## 2024-01-03 NOTE — OUTREACH NOTE
Prep Survey      Flowsheet Row Responses   [facility] facility patient discharged from? [facility]   Is LACE score < 7 ? Yes   Eligibility Not Eligible   What are the reasons patient is not eligible? Other  [low risk for readmit]   Does the patient have one of the following disease processes/diagnoses(primary or secondary)? Other   Prep survey completed? Yes            S[facility] - Registered Nurse

## 2024-01-04 LAB — FUNGUS WND CULT: ABNORMAL

## 2024-01-05 LAB
MYCOBACTERIUM SPEC CULT: NORMAL
NIGHT BLUE STAIN TISS: NORMAL
NIGHT BLUE STAIN TISS: NORMAL

## 2024-01-12 LAB
MYCOBACTERIUM SPEC CULT: NORMAL
NIGHT BLUE STAIN TISS: NORMAL
NIGHT BLUE STAIN TISS: NORMAL

## 2024-01-15 LAB — FUNGUS WND CULT: ABNORMAL

## 2024-01-19 LAB
MYCOBACTERIUM SPEC CULT: NORMAL
NIGHT BLUE STAIN TISS: NORMAL
NIGHT BLUE STAIN TISS: NORMAL

## 2024-01-23 ENCOUNTER — TELEPHONE (OUTPATIENT)
Dept: CARDIOLOGY | Facility: CLINIC | Age: 74
End: 2024-01-23
Payer: MEDICARE

## 2024-01-23 NOTE — TELEPHONE ENCOUNTER
Called pt back to offer an appt with Mackenzie ODOM as Dr Nichols is scheduling at the end of February. No answer left vmail.

## 2024-01-23 NOTE — TELEPHONE ENCOUNTER
Name: Thi Lynn    Relationship: Self    Best Callback Number: [phone]     Incoming call to the Hub, requesting to  Reschedule their HFU appointment on 1.29.24.     Per Hub workflow, further review is needed before the task can be completed.    Result of Call: Please reach out to the patient to reschedule    PATIENT WOULD LIKE AN APPOINTMENT AROUND 10AM-1PM.

## 2024-01-24 LAB — FUNGUS WND CULT: ABNORMAL

## 2024-01-26 LAB
MYCOBACTERIUM SPEC CULT: NORMAL
NIGHT BLUE STAIN TISS: NORMAL
NIGHT BLUE STAIN TISS: NORMAL

## 2024-01-29 ENCOUNTER — OFFICE VISIT (OUTPATIENT)
Dept: CARDIOLOGY | Facility: CLINIC | Age: 74
End: 2024-01-29
Payer: MEDICARE

## 2024-01-29 VITALS
WEIGHT: 125 LBS | SYSTOLIC BLOOD PRESSURE: 168 MMHG | HEIGHT: 59 IN | DIASTOLIC BLOOD PRESSURE: 76 MMHG | HEART RATE: 64 BPM | BODY MASS INDEX: 25.2 KG/M2

## 2024-01-29 DIAGNOSIS — R55 SYNCOPE, UNSPECIFIED SYNCOPE TYPE: ICD-10-CM

## 2024-01-29 DIAGNOSIS — I10 PRIMARY HYPERTENSION: ICD-10-CM

## 2024-01-29 DIAGNOSIS — I48.0 PAROXYSMAL ATRIAL FIBRILLATION: Primary | ICD-10-CM

## 2024-01-29 DIAGNOSIS — I25.10 CORONARY ARTERY DISEASE INVOLVING NATIVE HEART WITHOUT ANGINA PECTORIS, UNSPECIFIED VESSEL OR LESION TYPE: ICD-10-CM

## 2024-01-29 DIAGNOSIS — E78.2 MIXED HYPERLIPIDEMIA: ICD-10-CM

## 2024-01-29 PROCEDURE — 99214 OFFICE O/P EST MOD 30 MIN: CPT | Performed by: INTERNAL MEDICINE

## 2024-01-29 PROCEDURE — 3078F DIAST BP <80 MM HG: CPT | Performed by: INTERNAL MEDICINE

## 2024-01-29 PROCEDURE — 3077F SYST BP >= 140 MM HG: CPT | Performed by: INTERNAL MEDICINE

## 2024-01-29 PROCEDURE — 93000 ELECTROCARDIOGRAM COMPLETE: CPT | Performed by: INTERNAL MEDICINE

## 2024-01-29 RX ORDER — AMLODIPINE BESYLATE 5 MG/1
5 TABLET ORAL DAILY
Qty: 90 TABLET | Refills: 3 | Status: SHIPPED | OUTPATIENT
Start: 2024-01-29

## 2024-01-29 NOTE — PROGRESS NOTES
Chief Complaint  Coronary Artery Disease and HOSPITAL F/U    Subjective        Thi Lynn presents to [facility] CARDIOLOGY  History of present illness:    Patient has had no further syncopal episodes since leaving the hospital.  It did sound like these could be possibly consistent with vasovagal syndrome.  She notes no palpitations or bleeding problems.  She was taken completely off the amlodipine and her blood pressure has been running elevated.  She has been getting some headaches.  She still gets the pain in her shoulder blades coming around to the chest.  This has been occurring for a year.      Past Medical History:   Diagnosis Date    Abnormal positron emission tomography (PET) scan     Asthma     Chest pain     FOLLOWED BY DR HEIN/ [facility]. DENIES CP/SOA.  DECREASED ACTIVITY D/T BILATERAL LEG PAIN    COPD (chronic obstructive pulmonary disease)     Coronary artery disease     Diabetes mellitus     REPORTS DOES NOT HAVE WORKING GLUCOMETER  AT THIS TIME    Elevated cholesterol     Hyperlipemia     Hypertension     Limb swelling     Lung nodules     Myocardial infarction     REPORTS HAD 3 VESSEL BYPASS    Pain management     Seasonal allergies     Stomach disorder     Stroke     ABOUT 3-4 YEARS AGO         Past Surgical History:   Procedure Laterality Date    BRONCHOSCOPY Bilateral 12/29/2023    Procedure: BRONCHOSCOPY BIOPSY WITH ANESTHESIA, BAL;  Surgeon: Shane Tamez DO;  Location: [facility] OR;  Service: Pulmonary;  Laterality: Bilateral;    CARDIAC CATHETERIZATION N/A 5/3/2023    Procedure: Left Heart Cath with possible coronary angioplasty;  Surgeon: Jason Hein MD;  Location: [facility] CATH INVASIVE LOCATION;  Service: Cardiology;  Laterality: N/A;    CATARACT EXTRACTION      Cataract Surgery    CHOLECYSTECTOMY      COLONOSCOPY  2019    COLONOSCOPY N/A 12/5/2023    Procedure: COLONOSCOPY WITH COLD SNARE POLYPECTOMY;  Surgeon: Josue العلي MD;   Location: [facility] ENDOSCOPY;  Service: Gastroenterology;  Laterality: N/A;  COLON POLYP, DIVERTICULOSIS    CORONARY ARTERY BYPASS GRAFT  2009    3v    ENDOSCOPY      + 10 years Franco Merrill in [place]    HYSTERECTOMY            Social History     Socioeconomic History    Marital status:    Tobacco Use    Smoking status: Every Day     Packs/day: 0.50     Years: 52.00     Additional pack years: 0.00     Total pack years: 26.00     Types: Cigarettes     Start date: 1971     Passive exposure: Current    Smokeless tobacco: Never    Tobacco comments:     LAST 12/31/23   Vaping Use    Vaping Use: Never used   Substance and Sexual Activity    Alcohol use: Never    Drug use: Never         Family History   Problem Relation Age of Onset    Heart disease Father     Cancer Father         Unspecified    Heart disease Brother     Colon polyps Mother           Allergies   Allergen Reactions    Morphine Swelling and Rash    Oxycodone-Acetaminophen Shortness Of Breath and Mental Status Change    Statins Myalgia    Sulfa Antibiotics Unknown - Low Severity     REPORTS HAPPENED AS CHILD AND UNSURE OF REACTION            Current Outpatient Medications:     albuterol sulfate  (90 Base) MCG/ACT inhaler, 2 puffs Every 4 (Four) Hours As Needed for Wheezing or Shortness of Air., Disp: , Rfl:     apixaban (ELIQUIS) 5 MG tablet tablet, Take 1 tablet by mouth Every 12 (Twelve) Hours for 30 days. Indications: Atrial Fibrillation, Disp: 60 tablet, Rfl: 0    aspirin 81 MG EC tablet, Take 1 tablet by mouth Daily for 30 days., Disp: 30 tablet, Rfl: 0    carvedilol (COREG) 3.125 MG tablet, Take 0.5 tablets by mouth Every 12 (Twelve) Hours for 30 days., Disp: 30 tablet, Rfl: 0    Evolocumab (REPATHA) solution prefilled syringe injection, Inject 1 mL under the skin into the appropriate area as directed Every 14 (Fourteen) Days., Disp: 6 each, Rfl: 3    ezetimibe (ZETIA) 10 MG tablet, Take 1 tablet by mouth Daily., Disp: 90 tablet, Rfl:  "3    HYDROcodone-acetaminophen (NORCO)  MG per tablet, Take 1 tablet by mouth Every 4 (Four) Hours As Needed., Disp: , Rfl:     metFORMIN ER (GLUCOPHAGE-XR) 500 MG 24 hr tablet, Take 1 tablet by mouth Daily With Breakfast. INSTRUCTED NONE AFTER 6 PMN ON 12/28/23 PER ANESTHESIA PROTOCOL, Disp: , Rfl:     nitroglycerin (NITRODUR) 0.2 MG/HR patch, Place 1 patch on the skin as directed by provider Daily., Disp: 30 patch, Rfl: 11    nitroglycerin (NITROSTAT) 0.4 MG SL tablet, 1 under the tongue as needed for angina, may repeat q5mins for up three doses, Disp: 100 tablet, Rfl: 11    pantoprazole (PROTONIX) 40 MG EC tablet, Take 1 tablet by mouth Daily., Disp: , Rfl:     traZODone (DESYREL) 150 MG tablet, Take 0.5 tablets by mouth Every Night. TAKES HALF A TABLET, Disp: , Rfl:     amLODIPine (NORVASC) 5 MG tablet, Take 1 tablet by mouth Daily., Disp: 90 tablet, Rfl: 3      ROS:  Cardiac review of systems positive for atypical chest pain otherwise normal    Objective     /76   Pulse 64   Ht 149.9 cm (59\")   Wt 56.7 kg (125 lb)   BMI 25.25 kg/m²       General Appearance:   well developed  well nourished  HENT:   oropharynx moist  lips not cyanotic  Respiratory:  no respiratory distress  normal breath sounds  no rales  Cardiovascular:  no jugular venous distention  regular rhythm  S1 normal, S2 normal  no S3, no S4   no murmur  no rub, no thrill  No carotid bruit  pedal pulses normal  lower extremity edema: none    Musculoskeletal:  no clubbing of fingers.   normocephalic, head atraumatic  Skin:   warm, dry  Psychiatric:  judgement and insight appropriate  normal mood and affect    ECHO:  Results for orders placed during the hospital encounter of 01/01/24    Adult Transthoracic Echo Complete W/ Cont if Necessary Per Protocol    Interpretation Summary    Left ventricular systolic function is normal. Left ventricular ejection fraction appears to be 56 - 60%.    Left ventricular wall thickness is consistent with " mild concentric hypertrophy.    Mild mitral valve regurgitation is present.    Estimated right ventricular systolic pressure from tricuspid regurgitation is normal (<35 mmHg).    STRESS:  Results for orders placed during the hospital encounter of 23    Stress Test With Myocardial Perfusion One Day    Interpretation Summary    Left ventricular ejection fraction is hyperdynamic (Calculated EF > 70%).    Stress electrocardiogram showed no ischemia.    Myocardial perfusion images show a small reversible apical defect.    Feel this represents a mildly abnormal stress test with the very small area of possible apical ischemia.  Would consider a trial of antianginal's and if not able to to make her asymptomatic then could consider left heart cath.    CATH:  Results for orders placed during the hospital encounter of 23    Cardiac Catheterization/Vascular Study    [facility]  CARDIAC CATHETERIZATION PROCEDURE REPORT    Patient: Thi Lynn  : 1950  MRN: [id number]  Procedure Date: 23    Referring Physician:  Amanda Mike APRN    Interventional Cardiologist:  Jason Nichols MD    Indication:  Continued chest pain despite multiple antianginals.  Abnormal stress test with possible apical ischemia.    Clinical Presentation:  Patient is a 72-year-old female who had a two-vessel coronary artery bypass surgery back in  with LIMA to LAD and saphenous vein graft to RCA who presented with continued chest pain despite 2 antianginals.  She had a stress test with possible apical ischemia.    Procedure performed:  Right femoral arterial sheath placement  Left Heart Catheterization  Selective coronary Angiography  Selective angiography of the LIMA to LAD and saphenous vein graft to RCA  Moderate conscious sedation  Angio-Seal device placement for hemostasis    Details of the procedure:  Informed consent was obtained with an explanation of the risks, benefits and alternatives of the  procedure. The patient was brought to the Cardiac Catheterization Laboratory in a fasting state.  The patient was prepped and draped in a standard, sterile fashion. Moderate conscious sedation with Fentanyl and Versed was administered by the circulating nurse. Lidocaine 2% was used to anesthetize the right femoral artery and a 6 Scottish sheath was placed via modified Seldinger technique.    6 Irish JR4 and 3 DRC catheters were used to selectively engage both coronary arteries.  JR4 catheter was used to selectively engage the saphenous vein graft to the RCA.  A 3 DRC was used to selectively engage the LIMA to LAD.  Multiple scintigraphic views were obtained.  A 6 Irish pigtail catheter was then used to cross the aortic valve.  Left heart hemodynamics were obtained.      Findings:  1. Coronary Artery Anatomy:  Dominance: Right  Left Main: Moderate size vessel giving off circumflex and LAD.  No significant disease.  Left Anterior Descending: Moderate size vessel with a mid focal 80% lesion.  The LIMA to LAD ties and right beyond this.  No significant disease.  Left Circumflex Artery: Moderate size vessel gives off a branching high OM.  The ostium and proximal segment of the OM has about 40 to 50% stenosis.  In the lower branch which is a very small vessel has approximately 70% ostial disease.  Right Coronary Artery: Proximally occluded.  SVG to RCA with mild nonobstructive diffuse disease.  LIMA to the LAD with mild to moderate nonobstructive disease in the body of the graft.  Fills the distal LAD well.    2. Hemodynamics:  The opening aortic pressure is 110/70 mmHg.  The left ventricular end-diastolic pressure is 10 mmHg.  There was no gradient across aortic valve on pullback                Cumulative fluoroscopy time: 14 min    Cumulative air kerma: 379 mGy    Total amount of contrast used: See report ml of Isovue      Complications:  None.    Estimated Blood Loss:  Minimal.    Conclusions:  2 out of 2 patent grafts  (LIMA to LAD and saphenous vein graft to RCA) with mild, nonobstructive disease in the body of the graft.  Small OM branch with borderline stenosis.  This is not where the stress test showed up was possibly abnormal.  Normal LVEDP    Recommendations:  Continued goal-directed medical management and risk factor reduction.  The patient already is on amlodipine and Coreg.  Could consider a trial of Ranexa.      Jason Nichols MD    05/03/23  12:58 EDT    BMP:     Glucose   Date Value Ref Range Status   01/02/2024 138 (H) 65 - 99 mg/dL Final     BUN   Date Value Ref Range Status   01/02/2024 4 (L) 8 - 23 mg/dL Final     Creatinine   Date Value Ref Range Status   01/02/2024 0.50 (L) 0.57 - 1.00 mg/dL Final     Sodium   Date Value Ref Range Status   01/02/2024 141 136 - 145 mmol/L Final     Potassium   Date Value Ref Range Status   01/02/2024 3.9 3.5 - 5.2 mmol/L Final     Chloride   Date Value Ref Range Status   01/02/2024 112 (H) 98 - 107 mmol/L Final     CO2   Date Value Ref Range Status   01/02/2024 19.8 (L) 22.0 - 29.0 mmol/L Final     Calcium   Date Value Ref Range Status   01/02/2024 7.9 (L) 8.6 - 10.5 mg/dL Final     BUN/Creatinine Ratio   Date Value Ref Range Status   01/02/2024 8.0 7.0 - 25.0 Final     Anion Gap   Date Value Ref Range Status   01/02/2024 9.2 5.0 - 15.0 mmol/L Final     eGFR   Date Value Ref Range Status   01/02/2024 99.2 >60.0 mL/min/1.73 Final     LIPIDS:  Total Cholesterol   Date Value Ref Range Status   08/31/2021 268 (H) 0 - 200 mg/dL Final     Triglycerides   Date Value Ref Range Status   08/31/2021 185 (H) 0 - 150 mg/dL Final     HDL Cholesterol   Date Value Ref Range Status   08/31/2021 44 40 - 60 mg/dL Final     LDL Cholesterol    Date Value Ref Range Status   08/31/2021 189 (H) 0 - 100 mg/dL Final     VLDL Cholesterol   Date Value Ref Range Status   08/31/2021 35 5 - 40 mg/dL Final     LDL/HDL Ratio   Date Value Ref Range Status   08/31/2021 4.25  Final           ECG 12  Lead    Date/Time: 1/29/2024 4:32 PM  Performed by: Jason Nichols MD    Authorized by: Jason Nichols MD  Comparison: compared with previous ECG from 1/1/2024  Comparison to previous ECG: Now back in sinus rhythm  Rhythm: sinus rhythm  Comments: Possible left atrial enlargement.                   ASSESSMENT:  Diagnoses and all orders for this visit:    1. Paroxysmal atrial fibrillation (Primary)    2. Primary hypertension    3. Mixed hyperlipidemia    4. Syncope, unspecified syncope type    5. Coronary artery disease involving native heart without angina pectoris, unspecified vessel or lesion type    Other orders  -     amLODIPine (NORVASC) 5 MG tablet; Take 1 tablet by mouth Daily.  Dispense: 90 tablet; Refill: 3         PLAN:    1.  Patient's chest pain I do not think represents a cardiac source.  We did a left heart cath back in May 2023 that showed 2 out of 2 patent grafts and no other significant disease.  2.  Patient had echocardiogram in the hospital that showed normal ejection fraction and no significant valvular disease.  She has had no further episodes of syncope.  I do think these could be consistent with vasovagal episodes.  3.  Checked an EKG that showed the patient back in normal sinus rhythm.  We will continue the Eliquis and baby aspirin.  She notes no bleeding problems.  4.  Continue the Repatha/Zetia.  She is intolerant of statins.  5.  Blood pressure is elevated.  They stopped her amlodipine when she was in the hospital.  In my note I said I would just back down on it if her blood pressure was low as an outpatient.  I will add back 5 mg daily (patient was on 10 mg).  I want to see if this helps with the blood pressure and her headaches.      Return in about 3 months (around 4/29/2024) for yuri avila.     Patient was given instructions and counseling regarding her condition or for health maintenance advice. Please see specific information pulled into the AVS if appropriate.          Jason Nichols MD   1/29/2024  16:29 EST

## 2024-01-30 ENCOUNTER — OFFICE VISIT (OUTPATIENT)
Dept: PULMONOLOGY | Facility: CLINIC | Age: 74
End: 2024-01-30
Payer: MEDICARE

## 2024-01-30 VITALS
WEIGHT: 125 LBS | TEMPERATURE: 97.8 F | BODY MASS INDEX: 25.2 KG/M2 | DIASTOLIC BLOOD PRESSURE: 61 MMHG | SYSTOLIC BLOOD PRESSURE: 174 MMHG | RESPIRATION RATE: 16 BRPM | OXYGEN SATURATION: 99 % | HEIGHT: 59 IN | HEART RATE: 63 BPM

## 2024-01-30 DIAGNOSIS — J44.9 CHRONIC OBSTRUCTIVE PULMONARY DISEASE, UNSPECIFIED COPD TYPE: ICD-10-CM

## 2024-01-30 DIAGNOSIS — J43.9 PULMONARY EMPHYSEMA, UNSPECIFIED EMPHYSEMA TYPE: ICD-10-CM

## 2024-01-30 DIAGNOSIS — R94.2 ABNORMAL PET SCAN OF LUNG: ICD-10-CM

## 2024-01-30 DIAGNOSIS — Z71.6 ENCOUNTER FOR SMOKING CESSATION COUNSELING: ICD-10-CM

## 2024-01-30 DIAGNOSIS — R91.1 LUNG NODULE: Primary | ICD-10-CM

## 2024-01-30 DIAGNOSIS — F17.210 NICOTINE DEPENDENCE, CIGARETTES, UNCOMPLICATED: ICD-10-CM

## 2024-01-30 RX ORDER — VARENICLINE TARTRATE 1 MG/1
1 TABLET, FILM COATED ORAL 2 TIMES DAILY
Qty: 60 TABLET | Refills: 4 | Status: SHIPPED | OUTPATIENT
Start: 2024-01-30

## 2024-01-30 RX ORDER — VARENICLINE TARTRATE 0.5 (11)-1
1 KIT ORAL TAKE AS DIRECTED
Qty: 53 EACH | Refills: 0 | Status: SHIPPED | OUTPATIENT
Start: 2024-01-30

## 2024-01-30 NOTE — PROGRESS NOTES
Primary Care Provider  Amanda Mike, ILNEE     Referring Provider  No ref. provider found     Chief Complaint  Follow-up (Bronch f/up ) and Lung Nodule    Subjective          Thi Lynn presents to [facility]  History of Present Illness  Thi Lynn is a 73 y.o. female patient here for management of emphysema, pulmonary nodule and tobacco use cigarettes ongoing.     Patient recently had a bronchoscopy with biopsy by Dr. Tamez on 12/29/2023.  Patient's fungus culture does show scant growth of Candida glabrata.  Given patient's lack of symptoms, we will refrain from treating at this time.  All other cultures are negative to date.  Cytology is also negative for any malignant cells.  After speaking with Dr. Tamez, it is recommended that patient undergo a repeat CT scan in 3 months.  Her most recent chest CT prior to her bronchoscopy was on 12/29/2023.  This did show the previously seen central left upper lobe opacity had nearly resolved.  It does note that there were tiny groundglass and tree-in-bud opacities in the upper lobes that had the appearance of an atypical infectious process.  Change in appearance of the previously demonstrated opacities in the right upper lobes, right middle lobe and central left upper lobe favors a likely related infectious process.  These findings were discussed with the patient today.  Of note, patient also had a hospitalization after her bronchoscopy due to atrial fibrillation and a syncopal episode.  Patient does follow-up with cardiology and states that she has had no further syncopal episodes that her medications have been adjusted.  He continues to smoke 0.5 packs of cigarettes daily and is trying to quit.  She is interested in Chantix at this time.  Overall, patient is doing well and has no additional concerns at this time.  She is able to perform her ADLs without difficulty.  She is up-to-date with her COVID, flu and  pneumonia vaccines.     Her history of smoking is   Tobacco Use: High Risk (1/30/2024)    Patient History     Smoking Tobacco Use: Every Day     Smokeless Tobacco Use: Never     Passive Exposure: Current     Thi Lynn  reports that she has been smoking cigarettes. She started smoking about 53 years ago. She has a 26.00 pack-year smoking history. She has been exposed to tobacco smoke. She has never used smokeless tobacco.. I have educated her on the risk of diseases from using tobacco products such as cancer, COPD, and heart disease.     I advised her to quit and she is willing to quit. We have discussed the following method/s for tobacco cessation:  Education Material Counseling [place] [facility].  Encourage a quit date for 1 month from today.  She will follow up with me in 3 months or sooner to check on her progress.    I spent 5 minutes counseling the patient.      Review of Systems   Constitutional:  Negative for chills, fatigue, fever, unexpected weight gain and unexpected weight loss.   HENT:  Congestion: Nasal.    Respiratory:  Negative for apnea, cough, shortness of breath and wheezing.         Negative for Hemoptysis     Cardiovascular:  Negative for chest pain, palpitations and leg swelling.   Skin:         Negative for cyanosis      Sleep: Negative for Excessive daytime sleepiness  Negative for morning headaches  Negative for Snoring    Family History   Problem Relation Age of Onset    Heart disease Father     Cancer Father         Unspecified    Heart disease Brother     Colon polyps Mother         Social History     Socioeconomic History    Marital status:    Tobacco Use    Smoking status: Every Day     Packs/day: 0.50     Years: 52.00     Additional pack years: 0.00     Total pack years: 26.00     Types: Cigarettes     Start date: 1971     Passive exposure: Current    Smokeless tobacco: Never    Tobacco comments:     5 cigarettes daily as of 1/30/2024 - [place]    Vaping Use     Vaping Use: Never used   Substance and Sexual Activity    Alcohol use: Never    Drug use: Never        Past Medical History:   Diagnosis Date    Abnormal positron emission tomography (PET) scan     Asthma     Chest pain     FOLLOWED BY DR HEIN/ [facility]. DENIES CP/SOA.  DECREASED ACTIVITY D/T BILATERAL LEG PAIN    COPD (chronic obstructive pulmonary disease)     Coronary artery disease     Diabetes mellitus     REPORTS DOES NOT HAVE WORKING GLUCOMETER  AT THIS TIME    Elevated cholesterol     Hyperlipemia     Hypertension     Limb swelling     Lung nodules     Myocardial infarction     REPORTS HAD 3 VESSEL BYPASS    Pain management     Seasonal allergies     Stomach disorder     Stroke     ABOUT 3-4 YEARS AGO        Immunization History   Administered Date(s) Administered    COVID-19 (MODERNA) 1st,2nd,3rd Dose Monovalent 01/01/2021, 03/25/2021, 04/22/2021    Fluzone (or Fluarix & Flulaval for VFC) >6mos 10/12/2020    Fluzone High-Dose 65+yrs 11/29/2021, 11/15/2023    Pneumococcal Conjugate 13-Valent (PCV13) 08/15/2022    Pneumococcal Polysaccharide (PPSV23) 08/21/2023         Allergies   Allergen Reactions    Morphine Swelling and Rash    Oxycodone-Acetaminophen Shortness Of Breath and Mental Status Change    Statins Myalgia    Sulfa Antibiotics Unknown - Low Severity     REPORTS HAPPENED AS CHILD AND UNSURE OF REACTION          Current Outpatient Medications:     albuterol sulfate  (90 Base) MCG/ACT inhaler, 2 puffs Every 4 (Four) Hours As Needed for Wheezing or Shortness of Air., Disp: , Rfl:     amLODIPine (NORVASC) 5 MG tablet, Take 1 tablet by mouth Daily., Disp: 90 tablet, Rfl: 3    apixaban (ELIQUIS) 5 MG tablet tablet, Take 1 tablet by mouth Every 12 (Twelve) Hours for 30 days. Indications: Atrial Fibrillation, Disp: 60 tablet, Rfl: 0    aspirin 81 MG EC tablet, Take 1 tablet by mouth Daily for 30 days., Disp: 30 tablet, Rfl: 0    carvedilol (COREG) 3.125 MG tablet, Take 0.5 tablets by mouth Every  12 (Twelve) Hours for 30 days., Disp: 30 tablet, Rfl: 0    Evolocumab (REPATHA) solution prefilled syringe injection, Inject 1 mL under the skin into the appropriate area as directed Every 14 (Fourteen) Days., Disp: 6 each, Rfl: 3    ezetimibe (ZETIA) 10 MG tablet, Take 1 tablet by mouth Daily., Disp: 90 tablet, Rfl: 3    HYDROcodone-acetaminophen (NORCO)  MG per tablet, Take 1 tablet by mouth Every 4 (Four) Hours As Needed., Disp: , Rfl:     metFORMIN ER (GLUCOPHAGE-XR) 500 MG 24 hr tablet, Take 1 tablet by mouth Daily With Breakfast. INSTRUCTED NONE AFTER 6 PMN ON 12/28/23 PER ANESTHESIA PROTOCOL, Disp: , Rfl:     nitroglycerin (NITRODUR) 0.2 MG/HR patch, Place 1 patch on the skin as directed by provider Daily., Disp: 30 patch, Rfl: 11    nitroglycerin (NITROSTAT) 0.4 MG SL tablet, 1 under the tongue as needed for angina, may repeat q5mins for up three doses, Disp: 100 tablet, Rfl: 11    pantoprazole (PROTONIX) 40 MG EC tablet, Take 1 tablet by mouth Daily., Disp: , Rfl:     traZODone (DESYREL) 150 MG tablet, Take 0.5 tablets by mouth Every Night. TAKES HALF A TABLET, Disp: , Rfl:     varenicline (CHANTIX) 1 MG tablet, Take 1 tablet by mouth 2 (Two) Times a Day., Disp: 60 tablet, Rfl: 4    Varenicline Tartrate, Starter, 0.5 MG X 11 & 1 MG X 42 tablet therapy pack, Take 1 tablet by mouth Take As Directed. Use as directed on package instructions, try to quit smoking after 1 week, Disp: 53 each, Rfl: 0     Objective   Physical Exam  Constitutional:       General: She is not in acute distress.     Appearance: Normal appearance. She is normal weight.   HENT:      Right Ear: Hearing normal.      Left Ear: Hearing normal.      Nose: No nasal tenderness or congestion.      Mouth/Throat:      Mouth: Mucous membranes are moist. No oral lesions.   Eyes:      Extraocular Movements: Extraocular movements intact.      Pupils: Pupils are equal, round, and reactive to light.   Neck:      Thyroid: No thyroid mass or  "thyromegaly.   Cardiovascular:      Rate and Rhythm: Normal rate and regular rhythm.      Pulses: Normal pulses.      Heart sounds: Normal heart sounds. No murmur heard.  Pulmonary:      Effort: Pulmonary effort is normal.      Breath sounds: Normal breath sounds. No wheezing, rhonchi or rales.   Musculoskeletal:      Cervical back: Neck supple.      Right lower leg: No edema.      Left lower leg: No edema.   Lymphadenopathy:      Cervical: No cervical adenopathy.      Upper Body:      Right upper body: No axillary adenopathy.   Skin:     General: Skin is warm and dry.      Findings: No lesion or rash.   Neurological:      General: No focal deficit present.      Mental Status: She is alert and oriented to person, place, and time.   Psychiatric:         Mood and Affect: Affect normal. Mood is not anxious or depressed.         Vital Signs:   /61 (BP Location: Left arm, Patient Position: Sitting, Cuff Size: Adult)   Pulse 63   Temp 97.8 °F (36.6 °C) (Temporal)   Resp 16   Ht 149.9 cm (59\")   Wt 56.7 kg (125 lb)   SpO2 99% Comment: RA  BMI 25.25 kg/m²        Result Review :   The following data was reviewed by: ILENE Gilbert on 01/30/2024:  CMP          12/29/2023    08:45 1/1/2024    02:09 1/2/2024    05:03   CMP   Glucose 146  136  138    BUN 9  9  4    Creatinine 0.74  0.63  0.50    EGFR 85.6  93.8  99.2    Sodium 144  142  141    Potassium 4.2  3.8  3.9    Chloride 107  105  112    Calcium 9.3  8.8  7.9    Total Protein  6.5     Albumin  3.8     Globulin  2.7     Total Bilirubin  0.3     Alkaline Phosphatase  70     AST (SGOT)  10     ALT (SGPT)  5     Albumin/Globulin Ratio  1.4     BUN/Creatinine Ratio 12.2  14.3  8.0    Anion Gap 10.9  13.1  9.2      CBC w/diff          5/3/2023    11:31 1/1/2024    02:09 1/2/2024    05:03   CBC w/Diff   WBC 10.40  11.43  8.38    RBC 4.31  4.49  3.89    Hemoglobin 12.7  12.6  10.9    Hematocrit 36.3  37.9  33.5    MCV 84.2  84.4  86.1    MCH 29.5  28.1  28.0  "   MCHC 35.0  33.2  32.5    RDW 12.8  13.2  13.5    Platelets 348  401  373    Neutrophil Rel %  70.7  64.8    Immature Granulocyte Rel %  0.2  0.2    Lymphocyte Rel %  21.6  25.7    Monocyte Rel %  6.4  6.8    Eosinophil Rel %  0.8  2.3    Basophil Rel %  0.3  0.2      Data reviewed : Radiologic studies chest CT 11/2/2023, PET scan 11/21/2023, chest CT 12/29/2023, Recent hospitalization notes Dr. Tamez's bronchoscopy operative note 12/29/2023, Hospital discharge summary 1/2/2024, and my last office note    Procedures        Assessment and Plan    Diagnoses and all orders for this visit:    1. Lung nodule (Primary)  -     CT Chest Without Contrast; Future    2. Abnormal PET scan of lung  -     CT Chest Without Contrast; Future    3. Pulmonary emphysema, unspecified emphysema type    4. Chronic obstructive pulmonary disease, unspecified COPD type    5. Nicotine dependence, cigarettes, uncomplicated  -     Varenicline Tartrate, Starter, 0.5 MG X 11 & 1 MG X 42 tablet therapy pack; Take 1 tablet by mouth Take As Directed. Use as directed on package instructions, try to quit smoking after 1 week  Dispense: 53 each; Refill: 0  -     varenicline (CHANTIX) 1 MG tablet; Take 1 tablet by mouth 2 (Two) Times a Day.  Dispense: 60 tablet; Refill: 4    6. Encounter for smoking cessation counseling    7.  Continue albuterol as needed.  8. Smoking cessation counseling provided.  I spent 5 minutes today counseling patient on the risks of smoking, including throat cancer, lung cancer, COPD, heart disease and death.  Also discussed the benefits of quitting.  Chantix sent to the pharmacy.  9.  Repeat chest CT in April 2024.  10.  Follow-up in 3 months after CT scan, sooner if needed.        Follow Up   Return in about 3 months (around 4/30/2024) for Recheck with Tomas after CT.  Patient was given instructions and counseling regarding her condition or for health maintenance advice. Please see specific information pulled  into the AVS if appropriate.

## 2024-01-30 NOTE — PATIENT INSTRUCTIONS
"Smoking Tobacco Information, Adult  Smoking tobacco can be harmful to your health. Tobacco contains a toxic colorless chemical called nicotine. Nicotine causes changes in your brain that make you want more and more. This is called addiction. This can make it hard to stop smoking once you start. Tobacco also has other toxic chemicals that can hurt your body and raise your risk of many cancers.  Menthol or \"lite\" tobacco or cigarette brands are not safer than regular brands.  How can smoking tobacco affect me?  Smoking tobacco puts you at risk for:  Cancer. Smoking is most commonly associated with lung cancer, but can also lead to cancer in other parts of the body.  Chronic obstructive pulmonary disease (COPD). This is a long-term lung condition that makes it hard to breathe. It also gets worse over time.  High blood pressure (hypertension), heart disease, stroke, heart attack, and lung infections, such as pneumonia.  Cataracts. This is when the lenses in the eyes become clouded.  Digestive problems. This may include peptic ulcers, heartburn, and gastroesophageal reflux disease (GERD).  Oral health problems, such as gum disease, mouth sores, and tooth loss.  Loss of taste and smell.  Smoking also affects how you look and smell. Smoking may cause:  Wrinkles.  Yellow or stained teeth, fingers, and fingernails.  Bad breath.  Bad-smelling clothes and hair.  Smoking tobacco can also affect your social life, because:  It may be challenging to find places to smoke when away from home. Many workplaces, restaurants, hotels, and public places are tobacco-free.  Smoking is expensive. This is due to the cost of tobacco and the long-term costs of treating health problems from smoking.  Secondhand smoke may affect those around you. Secondhand smoke can cause lung cancer, breathing problems, and heart disease. Children of smokers have a higher risk for:  Sudden infant death syndrome (SIDS).  Ear infections.  Lung infections.  What " actions can I take to prevent health problems?  Quit smoking    Do not start smoking. Quit if you already smoke.  Do not replace cigarette smoking with vaping devices, such as e-cigarettes.  Make a plan to quit smoking and commit to it. Look for programs to help you, and ask your health care provider for recommendations and ideas. Set a date and write down all the reasons you want to quit.  Let your friends and family know you are quitting so they can help and support you. Consider finding friends who also want to quit. It can be easier to quit with someone else, so that you can support each other.  Talk with your health care provider about using nicotine replacement medicines to help you quit. These include gum, lozenges, patches, sprays, or pills.  If you try to quit but return to smoking, stay positive. It is common to slip up when you first quit, so take it one day at a time.  Be prepared for cravings. When you feel the urge to smoke, chew gum or suck on hard candy.  Lifestyle  Stay busy.  Take care of your body. Get plenty of exercise, eat a healthy diet, and drink plenty of water.  Find ways to manage your stress, such as meditation, yoga, exercise, or time spent with friends and family.  Ask your health care provider about having regular tests (screenings) to check for cancer. This may include blood tests, imaging tests, and other tests.  Where to find support  To get support to quit smoking, consider:  Asking your health care provider for more information and resources.  Joining a support group for people who want to quit smoking in your local community. There are many effective programs that may help you to quit.  Calling the smokefree.gov counselor helpline at 3-334-QUIT-NOW (1-322.402.2852).  Where to find more information  You may find more information about quitting smoking from:  Centers for Disease Control and Prevention: cdc.gov/tobacco  Smokefree.gov: smokefree.gov  American Lung Association:  [employer].org  Contact a health care provider if:  You have problems breathing.  Your lips, nose, or fingers turn blue.  You have chest pain.  You are coughing up blood.  You feel like you will faint.  You have other health changes that cause you to worry.  Summary  Smoking tobacco can negatively affect your health, the health of those around you, your finances, and your social life.  Do not start smoking. Quit if you already smoke. If you need help quitting, ask your health care provider.  Consider joining a support group for people in your local community who want to quit smoking. There are many effective programs that may help you to quit.  This information is not intended to replace advice given to you by your health care provider. Make sure you discuss any questions you have with your health care provider.  Document Revised: 12/13/2022 Document Reviewed: 12/13/2022  Elsevier Patient Education © 2023 [employer]

## 2024-02-02 LAB
MYCOBACTERIUM SPEC CULT: NORMAL
NIGHT BLUE STAIN TISS: NORMAL
NIGHT BLUE STAIN TISS: NORMAL

## 2024-02-09 LAB
MYCOBACTERIUM SPEC CULT: NORMAL
NIGHT BLUE STAIN TISS: NORMAL
NIGHT BLUE STAIN TISS: NORMAL

## 2024-03-07 ENCOUNTER — OFFICE VISIT (OUTPATIENT)
Dept: CARDIOLOGY | Facility: CLINIC | Age: 74
End: 2024-03-07
Payer: MEDICARE

## 2024-03-07 VITALS
BODY MASS INDEX: 25 KG/M2 | HEIGHT: 59 IN | SYSTOLIC BLOOD PRESSURE: 168 MMHG | WEIGHT: 124 LBS | HEART RATE: 115 BPM | DIASTOLIC BLOOD PRESSURE: 70 MMHG

## 2024-03-07 DIAGNOSIS — E78.2 MIXED HYPERLIPIDEMIA: ICD-10-CM

## 2024-03-07 DIAGNOSIS — F17.210 CIGARETTE NICOTINE DEPENDENCE WITHOUT COMPLICATION: ICD-10-CM

## 2024-03-07 DIAGNOSIS — I10 PRIMARY HYPERTENSION: ICD-10-CM

## 2024-03-07 DIAGNOSIS — I48.0 PAROXYSMAL ATRIAL FIBRILLATION: Primary | ICD-10-CM

## 2024-03-07 DIAGNOSIS — I25.10 CORONARY ARTERY DISEASE INVOLVING NATIVE HEART WITHOUT ANGINA PECTORIS, UNSPECIFIED VESSEL OR LESION TYPE: ICD-10-CM

## 2024-03-07 RX ORDER — LIDOCAINE 50 MG/G
PATCH TOPICAL
COMMUNITY
Start: 2024-03-05

## 2024-03-07 RX ORDER — AMLODIPINE BESYLATE 10 MG/1
10 TABLET ORAL DAILY
Qty: 90 TABLET | Refills: 3 | Status: SHIPPED | OUTPATIENT
Start: 2024-03-07

## 2024-03-07 RX ORDER — FLUTICASONE FUROATE, UMECLIDINIUM BROMIDE AND VILANTEROL TRIFENATATE 200; 62.5; 25 UG/1; UG/1; UG/1
POWDER RESPIRATORY (INHALATION)
COMMUNITY
Start: 2024-02-19

## 2024-03-07 RX ORDER — APIXABAN 5 MG/1
TABLET, FILM COATED ORAL
COMMUNITY
Start: 2024-02-04

## 2024-03-07 RX ORDER — CARVEDILOL 3.12 MG/1
1.56 TABLET ORAL EVERY 12 HOURS SCHEDULED
Qty: 45 TABLET | Refills: 3 | Status: SHIPPED | OUTPATIENT
Start: 2024-03-07 | End: 2024-09-03

## 2024-03-07 NOTE — PROGRESS NOTES
Chief Complaint  Follow-up    Subjective        History of Present Illness  Thi Lynn presents to [facility] CARDIOLOGY for follow up.  Patient is a 73-year-old female with past medical history outlined below, significant for hypertension, hyperlipidemia, coronary artery disease status post previous CABG x 3 in 2008, COPD, newly diagnosed paroxysmal atrial fibrillation who presents for follow-up.  She is doing well.  She really has no complaints or concerns today.  She denies palpitations.  She has no chest pain or discomfort.  She denies any dyspnea, orthopnea, edema or syncope.      Past Medical History:   Diagnosis Date    Abnormal positron emission tomography (PET) scan     Asthma     Chest pain     FOLLOWED BY DR HEIN/ [facility]. DENIES CP/SOA.  DECREASED ACTIVITY D/T BILATERAL LEG PAIN    COPD (chronic obstructive pulmonary disease)     Coronary artery disease     Diabetes mellitus     REPORTS DOES NOT HAVE WORKING GLUCOMETER  AT THIS TIME    Elevated cholesterol     Hyperlipemia     Hypertension     Limb swelling     Lung nodules     Myocardial infarction     REPORTS HAD 3 VESSEL BYPASS    Pain management     Seasonal allergies     Stomach disorder     Stroke     ABOUT 3-4 YEARS AGO       ALLERGY  Allergies   Allergen Reactions    Morphine Swelling and Rash    Oxycodone-Acetaminophen Shortness Of Breath and Mental Status Change    Statins Myalgia    Sulfa Antibiotics Unknown - Low Severity     REPORTS HAPPENED AS CHILD AND UNSURE OF REACTION        Past Surgical History:   Procedure Laterality Date    BRONCHOSCOPY Bilateral 12/29/2023    Procedure: BRONCHOSCOPY BIOPSY WITH ANESTHESIA, BAL;  Surgeon: Shane Tamez DO;  Location: [facility] OR;  Service: Pulmonary;  Laterality: Bilateral;    CARDIAC CATHETERIZATION N/A 5/3/2023    Procedure: Left Heart Cath with possible coronary angioplasty;  Surgeon: Jason Hein MD;  Location: [facility] CATH INVASIVE LOCATION;   Service: Cardiology;  Laterality: N/A;    CATARACT EXTRACTION      Cataract Surgery    CHOLECYSTECTOMY      COLONOSCOPY  2019    COLONOSCOPY N/A 12/5/2023    Procedure: COLONOSCOPY WITH COLD SNARE POLYPECTOMY;  Surgeon: Josue العلي MD;  Location: [facility] ENDOSCOPY;  Service: Gastroenterology;  Laterality: N/A;  COLON POLYP, DIVERTICULOSIS    CORONARY ARTERY BYPASS GRAFT  2009    3v    ENDOSCOPY      + 10 years Francoefrain Merrill in [place]    HYSTERECTOMY          Social History     Socioeconomic History    Marital status:    Tobacco Use    Smoking status: Every Day     Current packs/day: 0.50     Average packs/day: 0.5 packs/day for 53.2 years (26.6 ttl pk-yrs)     Types: Cigarettes     Start date: 1971     Passive exposure: Current    Smokeless tobacco: Never    Tobacco comments:     5 cigarettes daily as of 1/30/2024 - [place]    Vaping Use    Vaping status: Never Used   Substance and Sexual Activity    Alcohol use: Never    Drug use: Never       Family History   Problem Relation Age of Onset    Heart disease Father     Cancer Father         Unspecified    Heart disease Brother     Colon polyps Mother         Current Outpatient Medications on File Prior to Visit   Medication Sig    albuterol sulfate  (90 Base) MCG/ACT inhaler 2 puffs Every 4 (Four) Hours As Needed for Wheezing or Shortness of Air.    Eliquis 5 MG tablet tablet TAKE 1 TABLET (5 MG TOTAL) BY MOUTH 2 (TWO) TIMES DAILY INDICATIONS: ATRIAL FIBRILLATION.    Evolocumab (REPATHA) solution prefilled syringe injection Inject 1 mL under the skin into the appropriate area as directed Every 14 (Fourteen) Days.    ezetimibe (ZETIA) 10 MG tablet Take 1 tablet by mouth Daily.    HYDROcodone-acetaminophen (NORCO)  MG per tablet Take 1 tablet by mouth Every 4 (Four) Hours As Needed.    lidocaine (LIDODERM) 5 % APPLY 1 PATCH BY TOPICAL ROUTE ONCE DAILY (MAY WEAR UP TO 12HOURS.)    metFORMIN ER (GLUCOPHAGE-XR) 500 MG 24 hr tablet Take 1 tablet  "by mouth Daily With Breakfast. INSTRUCTED NONE AFTER 6 PMN ON 12/28/23 PER ANESTHESIA PROTOCOL    nitroglycerin (NITRODUR) 0.2 MG/HR patch Place 1 patch on the skin as directed by provider Daily.    nitroglycerin (NITROSTAT) 0.4 MG SL tablet 1 under the tongue as needed for angina, may repeat q5mins for up three doses    pantoprazole (PROTONIX) 40 MG EC tablet Take 1 tablet by mouth Daily.    traZODone (DESYREL) 150 MG tablet Take 0.5 tablets by mouth Every Night. TAKES HALF A TABLET    Trelegy Ellipta 200-62.5-25 MCG/ACT inhaler     varenicline (CHANTIX) 1 MG tablet Take 1 tablet by mouth 2 (Two) Times a Day.    Varenicline Tartrate, Starter, 0.5 MG X 11 & 1 MG X 42 tablet therapy pack Take 1 tablet by mouth Take As Directed. Use as directed on package instructions, try to quit smoking after 1 week    [DISCONTINUED] amLODIPine (NORVASC) 5 MG tablet Take 1 tablet by mouth Daily.    [DISCONTINUED] carvedilol (COREG) 3.125 MG tablet Take 0.5 tablets by mouth Every 12 (Twelve) Hours for 30 days.     No current facility-administered medications on file prior to visit.       Objective   Vitals:    03/07/24 1100   BP: 168/70   BP Location: Right arm   Patient Position: Sitting   Cuff Size: Adult   Pulse: 115   Weight: 56.2 kg (124 lb)   Height: 149.9 cm (59\")       Physical Exam  Constitutional:       General: She is awake. She is not in acute distress.     Appearance: Normal appearance.   HENT:      Head: Normocephalic.      Nose: Nose normal. No congestion.   Eyes:      Extraocular Movements: Extraocular movements intact.      Conjunctiva/sclera: Conjunctivae normal.      Pupils: Pupils are equal, round, and reactive to light.   Neck:      Thyroid: No thyromegaly.      Vascular: No JVD.   Cardiovascular:      Rate and Rhythm: Normal rate and regular rhythm.      Chest Wall: PMI is not displaced.      Pulses: Normal pulses.      Heart sounds: Normal heart sounds, S1 normal and S2 normal. No murmur heard.     No friction " rub. No gallop. No S3 or S4 sounds.   Pulmonary:      Effort: Pulmonary effort is normal.      Breath sounds: Normal breath sounds. No wheezing, rhonchi or rales.   Abdominal:      General: Bowel sounds are normal.      Palpations: Abdomen is soft.      Tenderness: There is no abdominal tenderness.   Musculoskeletal:      Cervical back: No tenderness.      Right lower leg: No edema.      Left lower leg: No edema.   Lymphadenopathy:      Cervical: No cervical adenopathy.   Skin:     General: Skin is warm and dry.      Capillary Refill: Capillary refill takes less than 2 seconds.      Coloration: Skin is not cyanotic.      Findings: No petechiae or rash.      Nails: There is no clubbing.   Neurological:      Mental Status: She is alert.   Psychiatric:         Mood and Affect: Mood normal.         Behavior: Behavior is cooperative.           Result Review     The following data was reviewed by ILENE Serrano on 03/07/24.      CMP          12/29/2023    08:45 1/1/2024    02:09 1/2/2024    05:03   CMP   Glucose 146  136  138    BUN 9  9  4    Creatinine 0.74  0.63  0.50    EGFR 85.6  93.8  99.2    Sodium 144  142  141    Potassium 4.2  3.8  3.9    Chloride 107  105  112    Calcium 9.3  8.8  7.9    Total Protein  6.5     Albumin  3.8     Globulin  2.7     Total Bilirubin  0.3     Alkaline Phosphatase  70     AST (SGOT)  10     ALT (SGPT)  5     Albumin/Globulin Ratio  1.4     BUN/Creatinine Ratio 12.2  14.3  8.0    Anion Gap 10.9  13.1  9.2      CBC w/diff          5/3/2023    11:31 1/1/2024    02:09 1/2/2024    05:03   CBC w/Diff   WBC 10.40  11.43  8.38    RBC 4.31  4.49  3.89    Hemoglobin 12.7  12.6  10.9    Hematocrit 36.3  37.9  33.5    MCV 84.2  84.4  86.1    MCH 29.5  28.1  28.0    MCHC 35.0  33.2  32.5    RDW 12.8  13.2  13.5    Platelets 348  401  373    Neutrophil Rel %  70.7  64.8    Immature Granulocyte Rel %  0.2  0.2    Lymphocyte Rel %  21.6  25.7    Monocyte Rel %  6.4  6.8    Eosinophil Rel %   0.8  2.3    Basophil Rel %  0.3  0.2           Results for orders placed during the hospital encounter of 01/01/24    Adult Transthoracic Echo Complete W/ Cont if Necessary Per Protocol    Interpretation Summary    Left ventricular systolic function is normal. Left ventricular ejection fraction appears to be 56 - 60%.    Left ventricular wall thickness is consistent with mild concentric hypertrophy.    Mild mitral valve regurgitation is present.    Estimated right ventricular systolic pressure from tricuspid regurgitation is normal (<35 mmHg).    Results for orders placed during the hospital encounter of 04/20/23    Stress Test With Myocardial Perfusion One Day    Interpretation Summary    Left ventricular ejection fraction is hyperdynamic (Calculated EF > 70%).    Stress electrocardiogram showed no ischemia.    Myocardial perfusion images show a small reversible apical defect.    Feel this represents a mildly abnormal stress test with the very small area of possible apical ischemia.  Would consider a trial of antianginal's and if not able to to make her asymptomatic then could consider left heart cath.              Procedures    Assessment & Plan  Diagnoses and all orders for this visit:    1. Paroxysmal atrial fibrillation (Primary)    2. Primary hypertension    3. Mixed hyperlipidemia    4. Coronary artery disease involving native heart without angina pectoris, unspecified vessel or lesion type    5. Cigarette nicotine dependence without complication    Other orders  -     carvedilol (COREG) 3.125 MG tablet; Take 0.5 tablets by mouth Every 12 (Twelve) Hours for 180 days.  Dispense: 45 tablet; Refill: 3  -     amLODIPine (NORVASC) 10 MG tablet; Take 1 tablet by mouth Daily.  Dispense: 90 tablet; Refill: 3      1.  In a normal rhythm on exam today.  Continue Eliquis 5 mg twice daily for stroke risk reduction.  Continue carvedilol for rate control.  She was recently hospitalized with atrial fibrillation with rapid  ventricular rate.  2.  Blood pressure is elevated in the office today and she notes elevated blood pressure readings at home.  She was previously on 10 mg of amlodipine but this was discontinued during her hospitalization.  Dr. Nichols started her back on 5 mg at his last office visit.  Will increase to 10 mg today.  Continue home BP monitoring.  3.  Unknown control.  Continue Repatha.  Will plan to check lipid panel at next follow-up visit.  4.  Patient notes no angina or anginal-like symptoms.  Continue baby aspirin and clinical monitoring.  5.  Smoking cessation is advised.    The medical services provided during this encounter are part of ongoing care related to this patient's single serious condition or complex condition.            Follow Up   Return in about 4 months (around 7/7/2024) for With Dr. Nichols.    Patient was given instructions and counseling regarding her condition or for health maintenance advice. Please see specific information pulled into the AVS if appropriate.     Mackenzie Melara, APRN  03/07/24  11:12 EST    Dictated Utilizing Dragon Dictation

## 2024-04-22 ENCOUNTER — HOSPITAL ENCOUNTER (OUTPATIENT)
Dept: CT IMAGING | Facility: HOSPITAL | Age: 74
Discharge: HOME | End: 2024-04-22
Admitting: NURSE PRACTITIONER
Payer: MEDICARE

## 2024-04-22 DIAGNOSIS — R91.1 LUNG NODULE: ICD-10-CM

## 2024-04-22 DIAGNOSIS — R94.2 ABNORMAL PET SCAN OF LUNG: ICD-10-CM

## 2024-04-22 PROCEDURE — 71250 CT THORAX DX C-: CPT

## 2024-04-24 DIAGNOSIS — R05.8 PRODUCTIVE COUGH: Primary | ICD-10-CM

## 2024-04-24 DIAGNOSIS — R91.8 ABNORMAL CT SCAN OF LUNG: ICD-10-CM

## 2024-04-24 RX ORDER — AMOXICILLIN AND CLAVULANATE POTASSIUM 875; 125 MG/1; MG/1
1 TABLET, FILM COATED ORAL 2 TIMES DAILY
Qty: 14 TABLET | Refills: 0 | Status: SHIPPED | OUTPATIENT
Start: 2024-04-24

## 2024-04-25 ENCOUNTER — TELEPHONE (OUTPATIENT)
Dept: CARDIOLOGY | Facility: CLINIC | Age: 74
End: 2024-04-25
Payer: MEDICARE

## 2024-04-25 NOTE — TELEPHONE ENCOUNTER
Pharmacist, Hugo called needing clarification on medication, Carvedilol.  A script was sent in by Mackenzie on 3/7/24 for Carvedilol 3.25, take 1/2 tablet, BID.  Pharmacy stated pt was not aware of the decrease in dosage and therefore needs clarification.      Per chart office notes:    9/6/2023 - pt was prescribed and taking Carvedilol 6.25, BID, according to Mackenzie.    1/29/2024 -  Dr. Nichols's office note states pt is taking 3.125, 1/2 tablet, BID.    3/7/2024 - pt is taking 3.125, 1/2 tablet, BID, per Mackenzie.    Again, pt is not aware of any dose decrease. There are no other encounters to reflect dose decrease that I can see.    Please advise, thank you.

## 2024-04-29 NOTE — TELEPHONE ENCOUNTER
Caller: Thi Lynn    Relationship: Self    Best call back number: [phone]     What is the best time to reach you: ANYTIME    Who are you requesting to speak with (clinical staff, provider,  specific staff member):   CLINICAL    Do you know the name of the person who called: THI    What was the call regarding: PT STATES THAT PHARMACIST TOLD HER THAT HE STILL NEEDS CLARIFICATION ON THIS MEDICATION. PT CANNOT GET IT FILLED UNTIL THIS IS DONE. THEY'RE NOT SURE WHICH DOSAGE TO FILL. STATES PHARMACIST HAD CALLED WITH NO RESPONSE. PT ALSO STATED THAT A 90 DAY PRESCRIPTION WOULD BE CHEAPER IF THAT CAN DONE.  PT WOULD LIKE TO KNOW ONCE THIS IS DONE, SO SHE CAN .     Is it okay if the provider responds through [employer]: PLEASE CALL

## 2024-05-07 ENCOUNTER — TELEPHONE (OUTPATIENT)
Dept: CARDIOLOGY | Facility: CLINIC | Age: 74
End: 2024-05-07
Payer: MEDICARE

## 2024-05-07 RX ORDER — CARVEDILOL 3.12 MG/1
1.56 TABLET ORAL EVERY 12 HOURS SCHEDULED
Qty: 45 TABLET | Refills: 3 | Status: CANCELLED | OUTPATIENT
Start: 2024-05-07 | End: 2024-11-03

## 2024-05-07 RX ORDER — CARVEDILOL 3.12 MG/1
1.56 TABLET ORAL EVERY 12 HOURS SCHEDULED
Qty: 45 TABLET | Refills: 3 | Status: SHIPPED | OUTPATIENT
Start: 2024-05-07 | End: 2024-11-03

## 2024-06-28 ENCOUNTER — OFFICE VISIT (OUTPATIENT)
Dept: PULMONOLOGY | Facility: CLINIC | Age: 74
End: 2024-06-28
Payer: MEDICARE

## 2024-06-28 VITALS
WEIGHT: 120 LBS | HEIGHT: 59 IN | RESPIRATION RATE: 16 BRPM | HEART RATE: 65 BPM | OXYGEN SATURATION: 95 % | BODY MASS INDEX: 24.19 KG/M2 | SYSTOLIC BLOOD PRESSURE: 151 MMHG | DIASTOLIC BLOOD PRESSURE: 45 MMHG | TEMPERATURE: 97.6 F

## 2024-06-28 DIAGNOSIS — R91.8 ABNORMAL CT SCAN OF LUNG: ICD-10-CM

## 2024-06-28 DIAGNOSIS — R91.1 LUNG NODULE: Primary | ICD-10-CM

## 2024-06-28 DIAGNOSIS — F17.210 NICOTINE DEPENDENCE, CIGARETTES, UNCOMPLICATED: ICD-10-CM

## 2024-06-28 DIAGNOSIS — Z71.6 ENCOUNTER FOR SMOKING CESSATION COUNSELING: ICD-10-CM

## 2024-06-28 DIAGNOSIS — R06.09 DYSPNEA ON EXERTION: ICD-10-CM

## 2024-06-28 DIAGNOSIS — J43.9 PULMONARY EMPHYSEMA, UNSPECIFIED EMPHYSEMA TYPE: ICD-10-CM

## 2024-06-28 NOTE — PATIENT INSTRUCTIONS
"Smoking Tobacco Information, Adult  Smoking tobacco can be harmful to your health. Tobacco contains a toxic colorless chemical called nicotine. Nicotine causes changes in your brain that make you want more and more. This is called addiction. This can make it hard to stop smoking once you start. Tobacco also has other toxic chemicals that can hurt your body and raise your risk of many cancers.  Menthol or \"lite\" tobacco or cigarette brands are not safer than regular brands.  How can smoking tobacco affect me?  Smoking tobacco puts you at risk for:  Cancer. Smoking is most commonly associated with lung cancer, but can also lead to cancer in other parts of the body.  Chronic obstructive pulmonary disease (COPD). This is a long-term lung condition that makes it hard to breathe. It also gets worse over time.  High blood pressure (hypertension), heart disease, stroke, heart attack, and lung infections, such as pneumonia.  Cataracts. This is when the lenses in the eyes become clouded.  Digestive problems. This may include peptic ulcers, heartburn, and gastroesophageal reflux disease (GERD).  Oral health problems, such as gum disease, mouth sores, and tooth loss.  Loss of taste and smell.  Smoking also affects how you look and smell. Smoking may cause:  Wrinkles.  Yellow or stained teeth, fingers, and fingernails.  Bad breath.  Bad-smelling clothes and hair.  Smoking tobacco can also affect your social life, because:  It may be challenging to find places to smoke when away from home. Many workplaces, restaurants, hotels, and public places are tobacco-free.  Smoking is expensive. This is due to the cost of tobacco and the long-term costs of treating health problems from smoking.  Secondhand smoke may affect those around you. Secondhand smoke can cause lung cancer, breathing problems, and heart disease. Children of smokers have a higher risk for:  Sudden infant death syndrome (SIDS).  Ear infections.  Lung infections.  What " actions can I take to prevent health problems?  Quit smoking    Do not start smoking. Quit if you already smoke.  Do not replace cigarette smoking with vaping devices, such as e-cigarettes.  Make a plan to quit smoking and commit to it. Look for programs to help you, and ask your health care provider for recommendations and ideas. Set a date and write down all the reasons you want to quit.  Let your friends and family know you are quitting so they can help and support you. Consider finding friends who also want to quit. It can be easier to quit with someone else, so that you can support each other.  Talk with your health care provider about using nicotine replacement medicines to help you quit. These include gum, lozenges, patches, sprays, or pills.  If you try to quit but return to smoking, stay positive. It is common to slip up when you first quit, so take it one day at a time.  Be prepared for cravings. When you feel the urge to smoke, chew gum or suck on hard candy.  Lifestyle  Stay busy.  Take care of your body. Get plenty of exercise, eat a healthy diet, and drink plenty of water.  Find ways to manage your stress, such as meditation, yoga, exercise, or time spent with friends and family.  Ask your health care provider about having regular tests (screenings) to check for cancer. This may include blood tests, imaging tests, and other tests.  Where to find support  To get support to quit smoking, consider:  Asking your health care provider for more information and resources.  Joining a support group for people who want to quit smoking in your local community. There are many effective programs that may help you to quit.  Calling the smokefree.gov counselor helpline at 2-816-QUIT-NOW (1-370.320.9205).  Where to find more information  You may find more information about quitting smoking from:  Centers for Disease Control and Prevention: cdc.gov/tobacco  Smokefree.gov: smokefree.gov  American Lung Association:  [employer]org  Contact a health care provider if:  You have problems breathing.  Your lips, nose, or fingers turn blue.  You have chest pain.  You are coughing up blood.  You feel like you will faint.  You have other health changes that cause you to worry.  Summary  Smoking tobacco can negatively affect your health, the health of those around you, your finances, and your social life.  Do not start smoking. Quit if you already smoke. If you need help quitting, ask your health care provider.  Consider joining a support group for people in your local community who want to quit smoking. There are many effective programs that may help you to quit.  This information is not intended to replace advice given to you by your health care provider. Make sure you discuss any questions you have with your health care provider.  Document Revised: 12/13/2022 Document Reviewed: 12/13/2022  Elsevier Patient Education © 2024 [employer]

## 2024-06-28 NOTE — PROGRESS NOTES
Primary Care Provider  Amanda Mike, ILENE     Referring Provider  No ref. provider found     Chief Complaint  Cough (Clear phlegm ), Follow-up (3 month f/up - CT Results. ), and Emphysema    Subjective          Thi Lynn presents to [facility] PULMON[facility]ARE [facility]ICINE  History of Present Illness  Thi Lynn is a 73 y.o. female patient here for management of emphysema, pulmonary nodule and tobacco use cigarettes ongoing.     Patient had a chest CT on 4/22/2023.  This did show interval increase in bilateral peribronchial reticular nodular densities suggesting an atypical infectious process.  There is an interval increase in linear and nodular consolidation in the lingula.  Patient did give a sputum culture which was negative.  A 7-day course of antibiotics was sent to patient's pharmacy at that time.  Patient believes that she did finish this medication.  She has not been using Trelegy.  She is also intermittently using albuterol but states that is not often.  She does not have very significant shortness of breath.  Patient states that occasionally she will have chest pressure but this resolves quickly when she lays down.  Unfortunately, her  did pass away earlier this month.  She continues to smoke approximate 0.5 packs of cigarettes daily and working on cutting back.  She still able to perform her ADLs without difficulty.  She is up-to-date with her COVID, flu and pneumonia vaccines.     Her history of smoking is   Tobacco Use: High Risk (6/28/2024)    Patient History     Smoking Tobacco Use: Every Day     Smokeless Tobacco Use: Never     Passive Exposure: Current     Thi Lynn  reports that she has been smoking cigarettes. She started smoking about 53 years ago. She has a 26.7 pack-year smoking history. She has been exposed to tobacco smoke. She has never used smokeless tobacco. I have educated her on the risk of diseases from using tobacco products such as  cancer, COPD, and heart disease.     I advised her to quit and she is willing to quit. We have discussed the following method/s for tobacco cessation:  Education Material, Counseling, and [place].  Encourage a quit date for  3 months .  She will follow up with me in 4 months or sooner to check on her progress.    I spent 5 minutes counseling the patient.           Review of Systems   Constitutional:  Negative for chills, fatigue, fever, unexpected weight gain and unexpected weight loss.   HENT:  Congestion: Nasal.    Respiratory:  Positive for cough. Negative for apnea, shortness of breath and wheezing.         Negative for Hemoptysis     Cardiovascular:  Negative for chest pain, palpitations and leg swelling.   Skin:         Negative for cyanosis      Sleep: Negative for Excessive daytime sleepiness  Negative for morning headaches  Negative for Snoring    Family History   Problem Relation Age of Onset    Heart disease Father     Cancer Father         Unspecified    Heart disease Brother     Colon polyps Mother         Social History     Socioeconomic History    Marital status:    Tobacco Use    Smoking status: Every Day     Current packs/day: 0.50     Average packs/day: 0.5 packs/day for 53.5 years (26.7 ttl pk-yrs)     Types: Cigarettes     Start date: 1971     Passive exposure: Current    Smokeless tobacco: Never    Tobacco comments:     5 cigarettes daily as of 1/30/2024 - [place]    Vaping Use    Vaping status: Never Used   Substance and Sexual Activity    Alcohol use: Never    Drug use: Never        Past Medical History:   Diagnosis Date    Abnormal positron emission tomography (PET) scan     Asthma     Chest pain     FOLLOWED BY DR HEIN/ [facility]. DENIES CP/SOA.  DECREASED ACTIVITY D/T BILATERAL LEG PAIN    COPD (chronic obstructive pulmonary disease)     Coronary artery disease     Diabetes mellitus     REPORTS DOES NOT HAVE WORKING GLUCOMETER  AT THIS TIME    Elevated cholesterol     Hyperlipemia      Hypertension     Limb swelling     Lung nodules     Myocardial infarction     REPORTS HAD 3 VESSEL BYPASS    Pain management     Seasonal allergies     Stomach disorder     Stroke     ABOUT 3-4 YEARS AGO        Immunization History   Administered Date(s) Administered    COVID-19 (MODERNA) 1st,2nd,3rd Dose Monovalent 01/01/2021, 03/25/2021, 04/22/2021    Fluzone (or Fluarix & Flulaval for VFC) >6mos 10/12/2020    Fluzone High-Dose 65+yrs 11/29/2021, 11/15/2023    Pneumococcal Conjugate 13-Valent (PCV13) 08/15/2022    Pneumococcal Polysaccharide (PPSV23) 08/21/2023         Allergies   Allergen Reactions    Morphine Swelling and Rash    Oxycodone-Acetaminophen Shortness Of Breath and Mental Status Change    Statins Myalgia    Sulfa Antibiotics Unknown - Low Severity     REPORTS HAPPENED AS CHILD AND UNSURE OF REACTION          Current Outpatient Medications:     albuterol sulfate  (90 Base) MCG/ACT inhaler, 2 puffs Every 4 (Four) Hours As Needed for Wheezing or Shortness of Air., Disp: , Rfl:     amLODIPine (NORVASC) 10 MG tablet, Take 1 tablet by mouth Daily., Disp: 90 tablet, Rfl: 3    carvedilol (COREG) 3.125 MG tablet, Take 0.5 tablets by mouth Every 12 (Twelve) Hours for 180 days., Disp: 45 tablet, Rfl: 3    Eliquis 5 MG tablet tablet, TAKE 1 TABLET (5 MG TOTAL) BY MOUTH 2 (TWO) TIMES DAILY INDICATIONS: ATRIAL FIBRILLATION., Disp: , Rfl:     Evolocumab (REPATHA) solution prefilled syringe injection, Inject 1 mL under the skin into the appropriate area as directed Every 14 (Fourteen) Days., Disp: 6 each, Rfl: 3    ezetimibe (ZETIA) 10 MG tablet, Take 1 tablet by mouth Daily., Disp: 90 tablet, Rfl: 3    HYDROcodone-acetaminophen (NORCO)  MG per tablet, Take 1 tablet by mouth Every 4 (Four) Hours As Needed., Disp: , Rfl:     lidocaine (LIDODERM) 5 %, APPLY 1 PATCH BY TOPICAL ROUTE ONCE DAILY (MAY WEAR UP TO 12HOURS.), Disp: , Rfl:     metFORMIN ER (GLUCOPHAGE-XR) 500 MG 24 hr tablet, Take 1 tablet by  mouth Daily With Breakfast. INSTRUCTED NONE AFTER 6 PMN ON 12/28/23 PER ANESTHESIA PROTOCOL, Disp: , Rfl:     nitroglycerin (NITRODUR) 0.2 MG/HR patch, Place 1 patch on the skin as directed by provider Daily., Disp: 30 patch, Rfl: 11    nitroglycerin (NITROSTAT) 0.4 MG SL tablet, 1 under the tongue as needed for angina, may repeat q5mins for up three doses, Disp: 100 tablet, Rfl: 11    pantoprazole (PROTONIX) 40 MG EC tablet, Take 1 tablet by mouth Daily., Disp: , Rfl:     traZODone (DESYREL) 150 MG tablet, Take 0.5 tablets by mouth Every Night. TAKES HALF A TABLET, Disp: , Rfl:     Trelegy Ellipta 200-62.5-25 MCG/ACT inhaler, , Disp: , Rfl:     varenicline (CHANTIX) 1 MG tablet, Take 1 tablet by mouth 2 (Two) Times a Day. (Patient not taking: Reported on 6/28/2024), Disp: 60 tablet, Rfl: 4    Varenicline Tartrate, Starter, 0.5 MG X 11 & 1 MG X 42 tablet therapy pack, Take 1 tablet by mouth Take As Directed. Use as directed on package instructions, try to quit smoking after 1 week (Patient not taking: Reported on 6/28/2024), Disp: 53 each, Rfl: 0     Objective   Physical Exam  Constitutional:       General: She is not in acute distress.     Appearance: Normal appearance. She is normal weight.   HENT:      Right Ear: Hearing normal.      Left Ear: Hearing normal.      Nose: No nasal tenderness or congestion.      Mouth/Throat:      Mouth: Mucous membranes are moist. No oral lesions.   Eyes:      Extraocular Movements: Extraocular movements intact.      Pupils: Pupils are equal, round, and reactive to light.   Cardiovascular:      Rate and Rhythm: Normal rate and regular rhythm.      Pulses: Normal pulses.      Heart sounds: Normal heart sounds. No murmur heard.  Pulmonary:      Effort: Pulmonary effort is normal.      Breath sounds: Normal breath sounds. No wheezing, rhonchi or rales.   Musculoskeletal:      Right lower leg: No edema.      Left lower leg: No edema.   Skin:     General: Skin is warm and dry.       "Findings: No lesion or rash.   Neurological:      General: No focal deficit present.      Mental Status: She is alert and oriented to person, place, and time.   Psychiatric:         Mood and Affect: Affect normal. Mood is not anxious or depressed.         Vital Signs:   /45 (BP Location: Right arm, Patient Position: Sitting, Cuff Size: Adult) Comment: Took B/P medication PTA  Pulse 65   Temp 97.6 °F (36.4 °C) (Oral)   Resp 16   Ht 149.9 cm (59\")   Wt 54.4 kg (120 lb)   SpO2 95% Comment: RA  BMI 24.24 kg/m²        Result Review :   The following data was reviewed by: ILENE Gilbert on 06/28/2024:  CMP          12/29/2023    08:45 1/1/2024    02:09 1/2/2024    05:03   CMP   Glucose 146  136  138    BUN 9  9  4    Creatinine 0.74  0.63  0.50    EGFR 85.6  93.8  99.2    Sodium 144  142  141    Potassium 4.2  3.8  3.9    Chloride 107  105  112    Calcium 9.3  8.8  7.9    Total Protein  6.5     Albumin  3.8     Globulin  2.7     Total Bilirubin  0.3     Alkaline Phosphatase  70     AST (SGOT)  10     ALT (SGPT)  5     Albumin/Globulin Ratio  1.4     BUN/Creatinine Ratio 12.2  14.3  8.0    Anion Gap 10.9  13.1  9.2      CBC w/diff          1/1/2024    02:09 1/2/2024    05:03   CBC w/Diff   WBC 11.43  8.38    RBC 4.49  3.89    Hemoglobin 12.6  10.9    Hematocrit 37.9  33.5    MCV 84.4  86.1    MCH 28.1  28.0    MCHC 33.2  32.5    RDW 13.2  13.5    Platelets 401  373    Neutrophil Rel % 70.7  64.8    Immature Granulocyte Rel % 0.2  0.2    Lymphocyte Rel % 21.6  25.7    Monocyte Rel % 6.4  6.8    Eosinophil Rel % 0.8  2.3    Basophil Rel % 0.3  0.2      Data reviewed : Radiologic [place] [address] 23, PET scan 11/21/2023, chest CT 12/29/2023, chest CT 4/22/2024 and my last office note    Procedures        Assessment and Plan    Diagnoses and all orders for this visit:    1. Lung nodule (Primary)  Comments:  Repeat chest CT in 1 month  Orders:  -     CT Chest Without Contrast; Future    2. Abnormal CT " scan of lung  Comments:  Repeat chest CT 1 month  Orders:  -     CT Chest Without Contrast; Future    3. Pulmonary emphysema, unspecified emphysema type  Comments:  Stable    4. Dyspnea on exertion  Comments:  Continue albuterol as needed    5. Nicotine dependence, cigarettes, uncomplicated  Comments:  Smoking cessation education    6. Encounter for smoking cessation counseling    Smoking cessation counseling provided.  I spent 5 minutes today counseling patient on the risks of smoking, including throat cancer, lung cancer, COPD, heart disease and death.  Also discussed the benefits of quitting.         Follow Up   Return in about 3 months (around 9/28/2024) for Recheck with Tova.  Patient was given instructions and counseling regarding her condition or for health maintenance advice. Please see specific information pulled into the AVS if appropriate.

## 2024-07-15 ENCOUNTER — TELEPHONE (OUTPATIENT)
Dept: CARDIOLOGY | Facility: CLINIC | Age: 74
End: 2024-07-15
Payer: MEDICARE

## 2024-07-15 ENCOUNTER — OFFICE VISIT (OUTPATIENT)
Dept: CARDIOLOGY | Facility: CLINIC | Age: 74
End: 2024-07-15
Payer: MEDICARE

## 2024-07-15 VITALS
SYSTOLIC BLOOD PRESSURE: 133 MMHG | WEIGHT: 123 LBS | HEART RATE: 76 BPM | BODY MASS INDEX: 24.8 KG/M2 | HEIGHT: 59 IN | DIASTOLIC BLOOD PRESSURE: 61 MMHG

## 2024-07-15 DIAGNOSIS — I48.0 PAROXYSMAL ATRIAL FIBRILLATION: ICD-10-CM

## 2024-07-15 DIAGNOSIS — Z72.0 TOBACCO ABUSE: ICD-10-CM

## 2024-07-15 DIAGNOSIS — E78.5 HYPERLIPIDEMIA LDL GOAL <70: Primary | ICD-10-CM

## 2024-07-15 DIAGNOSIS — I10 ESSENTIAL HYPERTENSION: ICD-10-CM

## 2024-07-15 DIAGNOSIS — I25.118 CORONARY ARTERY DISEASE INVOLVING NATIVE CORONARY ARTERY OF NATIVE HEART WITH OTHER FORM OF ANGINA PECTORIS: ICD-10-CM

## 2024-07-15 PROCEDURE — 99214 OFFICE O/P EST MOD 30 MIN: CPT | Performed by: INTERNAL MEDICINE

## 2024-07-15 PROCEDURE — 3078F DIAST BP <80 MM HG: CPT | Performed by: INTERNAL MEDICINE

## 2024-07-15 PROCEDURE — 3075F SYST BP GE 130 - 139MM HG: CPT | Performed by: INTERNAL MEDICINE

## 2024-07-15 RX ORDER — CARVEDILOL 3.12 MG/1
3.12 TABLET ORAL EVERY 12 HOURS SCHEDULED
Qty: 180 TABLET | Refills: 3 | Status: SHIPPED | OUTPATIENT
Start: 2024-07-15 | End: 2025-07-10

## 2024-07-15 RX ORDER — VARENICLINE TARTRATE 1 MG/1
1 TABLET, FILM COATED ORAL 2 TIMES DAILY
Qty: 60 TABLET | Refills: 3 | Status: SHIPPED | OUTPATIENT
Start: 2024-07-15

## 2024-07-15 NOTE — PROGRESS NOTES
Chief Complaint  Atrial Fibrillation and Coronary Artery Disease (4m Follow Up)    Subjective        Thi Lynn presents to [facility] CARDIOLOGY  History of present illness:    Patient states overall she is doing pretty well.  She notes no bleeding problems.  She denies any palpitations.  She notes no chest pain.  She did not try the Chantix.  She does note that her  passed away last month and she has been smoking more.  She has been having problems getting the Repatha but feels like it will be resolved by August.  She does go through the Repatha the patient assistance program.      Past Medical History:   Diagnosis Date    Abnormal positron emission tomography (PET) scan     Asthma     Chest pain     FOLLOWED BY DR HEIN/ [facility]. DENIES CP/SOA.  DECREASED ACTIVITY D/T BILATERAL LEG PAIN    COPD (chronic obstructive pulmonary disease)     Coronary artery disease     Diabetes mellitus     REPORTS DOES NOT HAVE WORKING GLUCOMETER  AT THIS TIME    Elevated cholesterol     Hyperlipemia     Hypertension     Limb swelling     Lung nodules     Myocardial infarction     REPORTS HAD 3 VESSEL BYPASS    Pain management     Seasonal allergies     Stomach disorder     Stroke     ABOUT 3-4 YEARS AGO         Past Surgical History:   Procedure Laterality Date    BRONCHOSCOPY Bilateral 12/29/2023    Procedure: BRONCHOSCOPY BIOPSY WITH ANESTHESIA, BAL;  Surgeon: Shane Tamez DO;  Location: [facility] MAIN OR;  Service: Pulmonary;  Laterality: Bilateral;    CARDIAC CATHETERIZATION N/A 5/3/2023    Procedure: Left Heart Cath with possible coronary angioplasty;  Surgeon: Jason Hein MD;  Location: [facility] CATH INVASIVE LOCATION;  Service: Cardiology;  Laterality: N/A;    CATARACT EXTRACTION      Cataract Surgery    CHOLECYSTECTOMY      COLONOSCOPY  2019    COLONOSCOPY N/A 12/5/2023    Procedure: COLONOSCOPY WITH COLD SNARE POLYPECTOMY;  Surgeon: Josue العلي MD;  Location:   [facility];  Service: Gastroenterology;  Laterality: N/A;  COLON POLYP, DIVERTICULOSIS    CORONARY ARTERY BYPASS GRAFT  2009    3v    ENDOSCOPY      + 10 years Franco Merrill in [place]    HYSTERECTOMY            Social History     Socioeconomic History    Marital status:    Tobacco Use    Smoking status: Every Day     Current packs/day: 0.50     Average packs/day: 0.5 packs/day for 53.5 years (26.8 ttl pk-yrs)     Types: Cigarettes     Start date: 1971     Passive exposure: Current    Smokeless tobacco: Never    Tobacco comments:     5 cigarettes daily as of 1/30/2024 - [place]    Vaping Use    Vaping status: Never Used   Substance and Sexual Activity    Alcohol use: Never    Drug use: Never         Family History   Problem Relation Age of Onset    Heart disease Father     Cancer Father         Unspecified    Heart disease Brother     Colon polyps Mother           Allergies   Allergen Reactions    Morphine Swelling and Rash    Oxycodone-Acetaminophen Shortness Of Breath and Mental Status Change    Statins Myalgia    Sulfa Antibiotics Unknown - Low Severity     REPORTS HAPPENED AS CHILD AND UNSURE OF REACTION            Current Outpatient Medications:     albuterol sulfate  (90 Base) MCG/ACT inhaler, 2 puffs Every 4 (Four) Hours As Needed for Wheezing or Shortness of Air., Disp: , Rfl:     amLODIPine (NORVASC) 10 MG tablet, Take 1 tablet by mouth Daily., Disp: 90 tablet, Rfl: 3    carvedilol (COREG) 3.125 MG tablet, Take 1 tablet by mouth Every 12 (Twelve) Hours for 360 days., Disp: 180 tablet, Rfl: 3    Eliquis 5 MG tablet tablet, TAKE 1 TABLET (5 MG TOTAL) BY MOUTH 2 (TWO) TIMES DAILY INDICATIONS: ATRIAL FIBRILLATION., Disp: , Rfl:     ezetimibe (ZETIA) 10 MG tablet, Take 1 tablet by mouth Daily., Disp: 90 tablet, Rfl: 3    HYDROcodone-acetaminophen (NORCO)  MG per tablet, Take 1 tablet by mouth Every 4 (Four) Hours As Needed., Disp: , Rfl:     lidocaine (LIDODERM) 5 %, APPLY 1 PATCH BY  "TOPICAL ROUTE ONCE DAILY (MAY WEAR UP TO 12HOURS.), Disp: , Rfl:     metFORMIN ER (GLUCOPHAGE-XR) 500 MG 24 hr tablet, Take 1 tablet by mouth Daily With Breakfast. INSTRUCTED NONE AFTER 6 PMN ON 12/28/23 PER ANESTHESIA PROTOCOL, Disp: , Rfl:     nitroglycerin (NITRODUR) 0.2 MG/HR patch, Place 1 patch on the skin as directed by provider Daily., Disp: 30 patch, Rfl: 11    nitroglycerin (NITROSTAT) 0.4 MG SL tablet, 1 under the tongue as needed for angina, may repeat q5mins for up three doses, Disp: 100 tablet, Rfl: 11    pantoprazole (PROTONIX) 40 MG EC tablet, Take 1 tablet by mouth Daily., Disp: , Rfl:     traZODone (DESYREL) 150 MG tablet, Take 0.5 tablets by mouth Every Night. TAKES HALF A TABLET, Disp: , Rfl:     Evolocumab (REPATHA) solution prefilled syringe injection, Inject 1 mL under the skin into the appropriate area as directed Every 14 (Fourteen) Days. (Patient not taking: Reported on 7/15/2024), Disp: 6 each, Rfl: 3    varenicline (Chantix Continuing Month Wilber) 1 MG tablet, Take 1 tablet by mouth 2 (Two) Times a Day., Disp: 60 tablet, Rfl: 3      ROS:  Cardiac review of systems negative.    Objective     /61   Pulse 76   Ht 149.9 cm (59\")   Wt 55.8 kg (123 lb)   BMI 24.84 kg/m²       General Appearance:   well developed  well nourished  HENT:   oropharynx moist  lips not cyanotic  Respiratory:  no respiratory distress  normal breath sounds  no rales  Cardiovascular:  no jugular venous distention  regular rhythm  S1 normal, S2 normal  no S3, no S4   no murmur  no rub, no thrill  No carotid bruit  pedal pulses normal  lower extremity edema: none    Musculoskeletal:  no clubbing of fingers.   normocephalic, head atraumatic  Skin:   warm, dry  Psychiatric:  judgement and insight appropriate  normal mood and affect    ECHO:  Results for orders placed during the hospital encounter of 01/01/24    Adult Transthoracic Echo Complete W/ Cont if Necessary Per Protocol    Interpretation Summary    Left " ventricular systolic function is normal. Left ventricular ejection fraction appears to be 56 - 60%.    Left ventricular wall thickness is consistent with mild concentric hypertrophy.    Mild mitral valve regurgitation is present.    Estimated right ventricular systolic pressure from tricuspid regurgitation is normal (<35 mmHg).    STRESS:  Results for orders placed during the hospital encounter of 23    Stress Test With Myocardial Perfusion One Day    Interpretation Summary    Left ventricular ejection fraction is hyperdynamic (Calculated EF > 70%).    Stress electrocardiogram showed no ischemia.    Myocardial perfusion images show a small reversible apical defect.    Feel this represents a mildly abnormal stress test with the very small area of possible apical ischemia.  Would consider a trial of antianginal's and if not able to to make her asymptomatic then could consider left heart cath.    CATH:  Results for orders placed during the hospital encounter of 23    Cardiac Catheterization/Vascular Study    Conclusion  [facility]  CARDIAC CATHETERIZATION PROCEDURE REPORT    Patient: Thi Lynn  : 1950  MRN: [id number]  Procedure Date: 23    Referring Physician:  Amanda Mike APRN    Interventional Cardiologist:  Jason Nichols MD    Indication:  Continued chest pain despite multiple antianginals.  Abnormal stress test with possible apical ischemia.    Clinical Presentation:  Patient is a 72-year-old female who had a two-vessel coronary artery bypass surgery back in  with LIMA to LAD and saphenous vein graft to RCA who presented with continued chest pain despite 2 antianginals.  She had a stress test with possible apical ischemia.    Procedure performed:  Right femoral arterial sheath placement  Left Heart Catheterization  Selective coronary Angiography  Selective angiography of the LIMA to LAD and saphenous vein graft to RCA  Moderate conscious sedation  Angio-Seal  device placement for hemostasis    Details of the procedure:  Informed consent was obtained with an explanation of the risks, benefits and alternatives of the procedure. The patient was brought to the Cardiac Catheterization Laboratory in a fasting state.  The patient was prepped and draped in a standard, sterile fashion. Moderate conscious sedation with Fentanyl and Versed was administered by the circulating nurse. Lidocaine 2% was used to anesthetize the right femoral artery and a 6 Hungarian sheath was placed via modified Seldinger technique.    6 [place]se JR4 and 3 DRC catheters were used to selectively engage both coronary arteries.  JR4 catheter was used to selectively engage the saphenous vein graft to the RCA.  A 3 DRC was used to selectively engage the LIMA to LAD.  Multiple scintigraphic views were obtained.  A 6 [place]se pigtail catheter was then used to cross the aortic valve.  Left heart hemodynamics were obtained.      Findings:  1. Coronary Artery Anatomy:  Dominance: Right  Left Main: Moderate size vessel giving off circumflex and LAD.  No significant disease.  Left Anterior Descending: Moderate size vessel with a mid focal 80% lesion.  The LIMA to LAD ties and right beyond this.  No significant disease.  Left Circumflex Artery: Moderate size vessel gives off a branching high OM.  The ostium and proximal segment of the OM has about 40 to 50% stenosis.  In the lower branch which is a very small vessel has approximately 70% ostial disease.  Right Coronary Artery: Proximally occluded.  SVG to RCA with mild nonobstructive diffuse disease.  LIMA to the LAD with mild to moderate nonobstructive disease in the body of the graft.  Fills the distal LAD well.    2. Hemodynamics:  The opening aortic pressure is 110/70 mmHg.  The left ventricular end-diastolic pressure is 10 mmHg.  There was no gradient across aortic valve on pullback                Cumulative fluoroscopy time: 14 min    Cumulative air kerma: 379  mGy    Total amount of contrast used: See report ml of Isovue      Complications:  None.    Estimated Blood Loss:  Minimal.    Conclusions:  2 out of 2 patent grafts (LIMA to LAD and saphenous vein graft to RCA) with mild, nonobstructive disease in the body of the graft.  Small OM branch with borderline stenosis.  This is not where the stress test showed up was possibly abnormal.  Normal LVEDP    Recommendations:  Continued goal-directed medical management and risk factor reduction.  The patient already is on amlodipine and Coreg.  Could consider a trial of Ranexa.      Jason Nichols MD    05/03/23  12:58 EDT    BMP:     Glucose   Date Value Ref Range Status   01/02/2024 138 (H) 65 - 99 mg/dL Final     BUN   Date Value Ref Range Status   01/02/2024 4 (L) 8 - 23 mg/dL Final     Creatinine   Date Value Ref Range Status   01/02/2024 0.50 (L) 0.57 - 1.00 mg/dL Final     Sodium   Date Value Ref Range Status   01/02/2024 141 136 - 145 mmol/L Final     Potassium   Date Value Ref Range Status   01/02/2024 3.9 3.5 - 5.2 mmol/L Final     Chloride   Date Value Ref Range Status   01/02/2024 112 (H) 98 - 107 mmol/L Final     CO2   Date Value Ref Range Status   01/02/2024 19.8 (L) 22.0 - 29.0 mmol/L Final     Calcium   Date Value Ref Range Status   01/02/2024 7.9 (L) 8.6 - 10.5 mg/dL Final     BUN/Creatinine Ratio   Date Value Ref Range Status   01/02/2024 8.0 7.0 - 25.0 Final     Anion Gap   Date Value Ref Range Status   01/02/2024 9.2 5.0 - 15.0 mmol/L Final     eGFR   Date Value Ref Range Status   01/02/2024 99.2 >60.0 mL/min/1.73 Final     LIPIDS:  Total Cholesterol   Date Value Ref Range Status   08/31/2021 268 (H) 0 - 200 mg/dL Final     Triglycerides   Date Value Ref Range Status   08/31/2021 185 (H) 0 - 150 mg/dL Final     HDL Cholesterol   Date Value Ref Range Status   08/31/2021 44 40 - 60 mg/dL Final     LDL Cholesterol    Date Value Ref Range Status   08/31/2021 189 (H) 0 - 100 mg/dL Final     VLDL Cholesterol    Date Value Ref Range Status   08/31/2021 35 5 - 40 mg/dL Final     LDL/HDL Ratio   Date Value Ref Range Status   08/31/2021 4.25  Final         Procedures             ASSESSMENT:  Diagnoses and all orders for this visit:    1. Hyperlipidemia LDL goal <70 (Primary)    2. Essential hypertension    3. Coronary artery disease involving native coronary artery of native heart with other form of angina pectoris    4. Paroxysmal atrial fibrillation    5. Tobacco abuse    Other orders  -     carvedilol (COREG) 3.125 MG tablet; Take 1 tablet by mouth Every 12 (Twelve) Hours for 360 days.  Dispense: 180 tablet; Refill: 3  -     varenicline (Chantix Continuing Month Pak) 1 MG tablet; Take 1 tablet by mouth 2 (Two) Times a Day.  Dispense: 60 tablet; Refill: 3         PLAN:    1.  Continue the aspirin and Eliquis.  The patient notes no bleeding problems.  2.  Will change patient's Coreg to 3.125 twice daily.  She has been having problems cutting the pill in half.  3.  Patient is going through the patient assistance program for Repatha.  She states that she feels like she will have it resolved by August.  I told her she had problems to give us a call.  She is intolerant of statins.  Also continue the Setia.  4.  Patient had left heart cath 5/23 that showed patent grafts with normal LVEDP.  She had an echo January 2024 that showed normal heart squeeze and no significant valvular disease.  5.  Blood pressures under good control.  6.  Encourage smoking cessation.  The patient wants another prescription for Chantix as she did not try it last time.  I did warn her of vivid dreams.      Return in about 6 months (around 1/15/2025).     Patient was given instructions and counseling regarding her condition or for health maintenance advice. Please see specific information pulled into the AVS if appropriate.         Jason Nichols MD   7/15/2024  11:50 EDT

## 2024-07-29 ENCOUNTER — HOSPITAL ENCOUNTER (OUTPATIENT)
Dept: CT IMAGING | Facility: HOSPITAL | Age: 74
Discharge: HOME | End: 2024-07-29
Admitting: NURSE PRACTITIONER
Payer: MEDICARE

## 2024-07-29 DIAGNOSIS — R91.1 LUNG NODULE: ICD-10-CM

## 2024-07-29 DIAGNOSIS — R91.8 ABNORMAL CT SCAN OF LUNG: ICD-10-CM

## 2024-07-29 PROCEDURE — 71250 CT THORAX DX C-: CPT

## 2024-11-26 NOTE — PATIENT INSTRUCTIONS
"Smoking Tobacco Information, Adult  Smoking tobacco can be harmful to your health. Tobacco contains a toxic colorless chemical called nicotine. Nicotine causes changes in your brain that make you want more and more. This is called addiction. This can make it hard to stop smoking once you start. Tobacco also has other toxic chemicals that can hurt your body and raise your risk of many cancers.  Menthol or \"lite\" tobacco or cigarette brands are not safer than regular brands.  How can smoking tobacco affect me?  Smoking tobacco puts you at risk for:  Cancer. Smoking is most commonly associated with lung cancer, but can also lead to cancer in other parts of the body.  Chronic obstructive pulmonary disease (COPD). This is a long-term lung condition that makes it hard to breathe. It also gets worse over time.  High blood pressure (hypertension), heart disease, stroke, heart attack, and lung infections, such as pneumonia.  Cataracts. This is when the lenses in the eyes become clouded.  Digestive problems. This may include peptic ulcers, heartburn, and gastroesophageal reflux disease (GERD).  Oral health problems, such as gum disease, mouth sores, and tooth loss.  Loss of taste and smell.  Smoking also affects how you look and smell. Smoking may cause:  Wrinkles.  Yellow or stained teeth, fingers, and fingernails.  Bad breath.  Bad-smelling clothes and hair.  Smoking tobacco can also affect your social life, because:  It may be challenging to find places to smoke when away from home. Many workplaces, restaurants, hotels, and public places are tobacco-free.  Smoking is expensive. This is due to the cost of tobacco and the long-term costs of treating health problems from smoking.  Secondhand smoke may affect those around you. Secondhand smoke can cause lung cancer, breathing problems, and heart disease. Children of smokers have a higher risk for:  Sudden infant death syndrome (SIDS).  Ear infections.  Lung infections.  What " actions can I take to prevent health problems?  Quit smoking    Do not start smoking. Quit if you already smoke.  Do not replace cigarette smoking with vaping devices, such as e-cigarettes.  Make a plan to quit smoking and commit to it. Look for programs to help you, and ask your health care provider for recommendations and ideas. Set a date and write down all the reasons you want to quit.  Let your friends and family know you are quitting so they can help and support you. Consider finding friends who also want to quit. It can be easier to quit with someone else, so that you can support each other.  Talk with your health care provider about using nicotine replacement medicines to help you quit. These include gum, lozenges, patches, sprays, or pills.  If you try to quit but return to smoking, stay positive. It is common to slip up when you first quit, so take it one day at a time.  Be prepared for cravings. When you feel the urge to smoke, chew gum or suck on hard candy.  Lifestyle  Stay busy.  Take care of your body. Get plenty of exercise, eat a healthy diet, and drink plenty of water.  Find ways to manage your stress, such as meditation, yoga, exercise, or time spent with friends and family.  Ask your health care provider about having regular tests (screenings) to check for cancer. This may include blood tests, imaging tests, and other tests.  Where to find support  To get support to quit smoking, consider:  Asking your health care provider for more information and resources.  Joining a support group for people who want to quit smoking in your local community. There are many effective programs that may help you to quit.  Calling the smokefree.gov counselor helpline at 3-491-QUIT-NOW (1-918.618.9422).  Where to find more information  You may find more information about quitting smoking from:  Centers for Disease Control and Prevention: cdc.gov/tobacco  Smokefree.gov: smokefree.gov  American Lung Association:  [employer]  Contact a health care provider if:  You have problems breathing.  Your lips, nose, or fingers turn blue.  You have chest pain.  You are coughing up blood.  You feel like you will faint.  You have other health changes that cause you to worry.  Summary  Smoking tobacco can negatively affect your health, the health of those around you, your finances, and your social life.  Do not start smoking. Quit if you already smoke. If you need help quitting, ask your health care provider.  Consider joining a support group for people in your local community who want to quit smoking. There are many effective programs that may help you to quit.  This information is not intended to replace advice given to you by your health care provider. Make sure you discuss any questions you have with your health care provider.  Document Revised: 12/13/2022 Document Reviewed: 12/13/2022  Elsevier Patient Education © 2024 [employer]

## 2024-11-26 NOTE — PROGRESS NOTES
"Primary Care Provider  Amanda Mike, APRN     Referring Provider  No ref. provider found     Chief Complaint  Follow-up (5 Months) and Lung nodule    Subjective          Thi Lynn presents to [facility]MON[facility] MEDICINE  History of Present Illness  Thi Lynn is a 74 y.o. female patient here for management of emphysema, pulmonary nodule and tobacco use cigarettes ongoing.     Patient states she is doing okay since her last office visit.  She denies using antibiotics or steroids for her lungs.  She denies any current fevers or chills.  She will intermittently use albuterol if needed.  She does have Trelegy, but states that she has not been using it as it does not \"choke her\".  Her shortness breath is very mild in severity, worse with exertion and improved with rest.  She continues to smoke 0.5 packs cigarettes daily and is working on quitting.  Overall, she is doing okay and has no additional concerns at this time.     Her history of smoking is   Tobacco Use: High Risk (11/27/2024)    Patient History     Smoking Tobacco Use: Every Day     Smokeless Tobacco Use: Never     Passive Exposure: Current   Thi Lynn  reports that she has been smoking cigarettes. She started smoking about 53 years ago. She has a 27 pack-year smoking history. She has been exposed to tobacco smoke. She has never used smokeless tobacco. I have educated her on the risk of diseases from using tobacco products such as cancer, COPD, and heart disease.     I advised her to quit and she is willing to quit. We have discussed the following method/s for tobacco cessation:  Education Material, Counseling, and [place].  Encourage a quit date for  6 months .  She will follow up with me in 9 months or sooner to check on her progress.    I spent 5 minutes counseling the patient.        Review of Systems   Constitutional:  Negative for chills, fatigue, fever, unexpected weight gain and unexpected weight " loss.   HENT:  Congestion: Nasal.    Respiratory:  Positive for shortness of breath. Negative for apnea, cough and wheezing.         Negative for Hemoptysis     Cardiovascular:  Negative for chest pain, palpitations and leg swelling.   Skin:         Negative for cyanosis      Sleep: Negative for Excessive daytime sleepiness  Negative for morning headaches  Negative for Snoring    Family History   Problem Relation Age of Onset    Heart disease Father     Cancer Father         Unspecified    Heart disease Brother     Colon polyps Mother         Social History     Socioeconomic History    Marital status:    Tobacco Use    Smoking status: Every Day     Current packs/day: 0.50     Average packs/day: 0.5 packs/day for 53.9 years (27.0 ttl pk-yrs)     Types: Cigarettes     Start date: 1971     Passive exposure: Current    Smokeless tobacco: Never    Tobacco comments:     5 cigarettes daily as of 1/30/2024 - AL    Vaping Use    Vaping status: Never Used   Substance and Sexual Activity    Alcohol use: Never    Drug use: Never        Past Medical History:   Diagnosis Date    Abnormal positron emission tomography (PET) scan     Asthma     Chest pain     FOLLOWED BY DR HEIN/ [facility]. DENIES CP/SOA.  DECREASED ACTIVITY D/T BILATERAL LEG PAIN    COPD (chronic obstructive pulmonary disease)     Coronary artery disease     Diabetes mellitus     REPORTS DOES NOT HAVE WORKING GLUCOMETER  AT THIS TIME    Elevated cholesterol     Hyperlipemia     Hypertension     Limb swelling     Lung nodules     Myocardial infarction     REPORTS HAD 3 VESSEL BYPASS    Pain management     Seasonal allergies     Stomach disorder     Stroke     ABOUT 3-4 YEARS AGO        Immunization History   Administered Date(s) Administered    COVID-19 (MODERNA) 1st,2nd,3rd Dose Monovalent 01/01/2021, 03/25/2021, 04/22/2021    Fluzone (or Fluarix & Flulaval for VFC) >6mos 10/12/2020    Fluzone High-Dose 65+YRS 11/27/2024    Fluzone High-Dose 65+yrs  11/29/2021, 11/15/2023    Pneumococcal Conjugate 13-Valent (PCV13) 08/15/2022    Pneumococcal Polysaccharide (PPSV23) 08/21/2023         Allergies   Allergen Reactions    Morphine Swelling and Rash    Oxycodone-Acetaminophen Shortness Of Breath and Mental Status Change    Statins Myalgia    Sulfa Antibiotics Unknown - Low Severity     REPORTS HAPPENED AS CHILD AND UNSURE OF REACTION          Current Outpatient Medications:     albuterol sulfate  (90 Base) MCG/ACT inhaler, 2 puffs Every 4 (Four) Hours As Needed for Wheezing or Shortness of Air., Disp: , Rfl:     amLODIPine (NORVASC) 10 MG tablet, Take 1 tablet by mouth Daily., Disp: 90 tablet, Rfl: 3    aspirin 81 MG EC tablet, Take 1 tablet by mouth Daily., Disp: , Rfl:     carvedilol (COREG) 3.125 MG tablet, Take 1 tablet by mouth Every 12 (Twelve) Hours for 360 days., Disp: 180 tablet, Rfl: 3    Eliquis 5 MG tablet tablet, TAKE 1 TABLET (5 MG TOTAL) BY MOUTH 2 (TWO) TIMES DAILY INDICATIONS: ATRIAL FIBRILLATION., Disp: , Rfl:     ezetimibe (ZETIA) 10 MG tablet, Take 1 tablet by mouth Daily., Disp: 90 tablet, Rfl: 3    fluticasone (FLONASE) 50 MCG/ACT nasal spray, INSTILL ONE SPRAY IN EACH NOSTRIL DAILY, Disp: , Rfl:     HYDROcodone-acetaminophen (NORCO)  MG per tablet, Take 1 tablet by mouth Every 4 (Four) Hours As Needed., Disp: , Rfl:     lidocaine (LIDODERM) 5 %, APPLY 1 PATCH BY TOPICAL ROUTE ONCE DAILY (MAY WEAR UP TO 12HOURS.), Disp: , Rfl:     metFORMIN ER (GLUCOPHAGE-XR) 500 MG 24 hr tablet, Take 1 tablet by mouth Daily With Breakfast. INSTRUCTED NONE AFTER 6 PMN ON 12/28/23 PER ANESTHESIA PROTOCOL, Disp: , Rfl:     metoprolol tartrate (LOPRESSOR) 50 MG tablet, Take 0.5 tablets by mouth., Disp: , Rfl:     nitroglycerin (NITRODUR) 0.2 MG/HR patch, Place 1 patch on the skin as directed by provider Daily., Disp: 30 patch, Rfl: 11    nitroglycerin (NITROSTAT) 0.4 MG SL tablet, 1 under the tongue as needed for angina, may repeat q5mins for up three  "doses, Disp: 100 tablet, Rfl: 11    pantoprazole (PROTONIX) 40 MG EC tablet, Take 1 tablet by mouth Daily., Disp: , Rfl:     varenicline (Chantix Continuing Month Wilber) 1 MG tablet, Take 1 tablet by mouth 2 (Two) Times a Day., Disp: 60 tablet, Rfl: 3    Evolocumab (REPATHA) solution prefilled syringe injection, Inject 1 mL under the skin into the appropriate area as directed Every 14 (Fourteen) Days. (Patient not taking: Reported on 7/15/2024), Disp: 6 each, Rfl: 3    traZODone (DESYREL) 150 MG tablet, Take 0.5 tablets by mouth Every Night. TAKES HALF A TABLET (Patient not taking: Reported on 11/27/2024), Disp: , Rfl:      Objective   Physical Exam  Constitutional:       General: She is not in acute distress.     Appearance: Normal appearance. She is normal weight.   HENT:      Right Ear: Hearing normal.      Left Ear: Hearing normal.      Nose: No nasal tenderness or congestion.      Mouth/Throat:      Mouth: Mucous membranes are moist. No oral lesions.   Eyes:      Extraocular Movements: Extraocular movements intact.      Pupils: Pupils are equal, round, and reactive to light.   Cardiovascular:      Rate and Rhythm: Normal rate and regular rhythm.      Pulses: Normal pulses.      Heart sounds: Normal heart sounds. No murmur heard.  Pulmonary:      Effort: Pulmonary effort is normal.      Breath sounds: Normal breath sounds. No wheezing, rhonchi or rales.   Musculoskeletal:      Right lower leg: No edema.      Left lower leg: No edema.   Skin:     General: Skin is warm and dry.      Findings: No lesion or rash.   Neurological:      General: No focal deficit present.      Mental Status: She is alert and oriented to person, place, and time.   Psychiatric:         Mood and Affect: Affect normal. Mood is not anxious or depressed.         Vital Signs:   /80 (BP Location: Left arm, Patient Position: Sitting, Cuff Size: Adult)   Pulse 68   Temp 97 °F (36.1 °C) (Temporal)   Resp 18   Ht 149.9 cm (59\")   Wt 54.4 " kg (120 lb)   SpO2 95% Comment: Room air  BMI 24.24 kg/m²        Result Review :   The following data was reviewed by: ILENE Gilbert on 11/27/2024:  CMP          12/29/2023    08:45 1/1/2024    02:09 1/2/2024    05:03   CMP   Glucose 146  136  138    BUN 9  9  4    Creatinine 0.74  0.63  0.50    EGFR 85.6  93.8  99.2    Sodium 144  142  141    Potassium 4.2  3.8  3.9    Chloride 107  105  112    Calcium 9.3  8.8  7.9    Total Protein  6.5     Albumin  3.8     Globulin  2.7     Total Bilirubin  0.3     Alkaline Phosphatase  70     AST (SGOT)  10     ALT (SGPT)  5     Albumin/Globulin Ratio  1.4     BUN/Creatinine Ratio 12.2  14.3  8.0    Anion Gap 10.9  13.1  9.2      CBC w/diff          1/1/2024    02:09 1/2/2024    05:03   CBC w/Diff   WBC 11.43  8.38    RBC 4.49  3.89    Hemoglobin 12.6  10.9    Hematocrit 37.9  33.5    MCV 84.4  86.1    MCH 28.1  28.0    MCHC 33.2  32.5    RDW 13.2  13.5    Platelets 401  373    Neutrophil Rel % 70.7  64.8    Immature Granulocyte Rel % 0.2  0.2    Lymphocyte Rel % 21.6  25.7    Monocyte Rel % 6.4  6.8    Eosinophil Rel % 0.8  2.3    Basophil Rel % 0.3  0.2      Data reviewed : Radiologic studies chest CT 4/22/2024, chest CT 7/29/2024 and my last office note    Procedures        Assessment and Plan    Diagnoses and all orders for this visit:    1. Pulmonary emphysema, unspecified emphysema type (Primary)    2. Lung nodule    3. Dyspnea on exertion  Comments:  Continue albuterol as needed    4. Nicotine dependence, cigarettes, uncomplicated  Comments:  Smoking cessation education  Orders:  -     CT Chest Low Dose Wo; Future    5. Encounter for smoking cessation counseling    6. Encounter for immunization  -     Fluzone High-Dose 65+yrs (0804-7788)    Smoking cessation counseling provided.  I spent 5 minutes today counseling patient on the risks of smoking, including throat cancer, lung cancer, COPD, heart disease and death.  Also discussed the benefits of quitting.        Follow Up   Return in about 8 months (around 8/11/2025).  Patient was given instructions and counseling regarding her condition or for health maintenance advice. Please see specific information pulled into the AVS if appropriate.

## 2024-11-27 ENCOUNTER — OFFICE VISIT (OUTPATIENT)
Dept: PULMONOLOGY | Facility: CLINIC | Age: 74
End: 2024-11-27
Payer: MEDICARE

## 2024-11-27 VITALS
DIASTOLIC BLOOD PRESSURE: 80 MMHG | WEIGHT: 120 LBS | HEIGHT: 59 IN | OXYGEN SATURATION: 95 % | TEMPERATURE: 97 F | HEART RATE: 68 BPM | BODY MASS INDEX: 24.19 KG/M2 | SYSTOLIC BLOOD PRESSURE: 150 MMHG | RESPIRATION RATE: 18 BRPM

## 2024-11-27 DIAGNOSIS — Z23 ENCOUNTER FOR IMMUNIZATION: ICD-10-CM

## 2024-11-27 DIAGNOSIS — Z71.6 ENCOUNTER FOR SMOKING CESSATION COUNSELING: ICD-10-CM

## 2024-11-27 DIAGNOSIS — R91.1 LUNG NODULE: ICD-10-CM

## 2024-11-27 DIAGNOSIS — F17.210 NICOTINE DEPENDENCE, CIGARETTES, UNCOMPLICATED: ICD-10-CM

## 2024-11-27 DIAGNOSIS — R06.09 DYSPNEA ON EXERTION: ICD-10-CM

## 2024-11-27 DIAGNOSIS — J43.9 PULMONARY EMPHYSEMA, UNSPECIFIED EMPHYSEMA TYPE: Primary | ICD-10-CM

## 2024-11-27 RX ORDER — FLUTICASONE PROPIONATE 50 MCG
SPRAY, SUSPENSION (ML) NASAL
COMMUNITY

## 2024-11-27 RX ORDER — ASPIRIN 81 MG/1
81 TABLET ORAL DAILY
COMMUNITY

## 2024-11-27 RX ORDER — METOPROLOL TARTRATE 50 MG
25 TABLET ORAL
COMMUNITY

## 2025-01-15 ENCOUNTER — OFFICE VISIT (OUTPATIENT)
Dept: CARDIOLOGY | Facility: CLINIC | Age: 75
End: 2025-01-15
Payer: MEDICARE

## 2025-01-15 VITALS
SYSTOLIC BLOOD PRESSURE: 137 MMHG | OXYGEN SATURATION: 98 % | HEIGHT: 59 IN | HEART RATE: 60 BPM | BODY MASS INDEX: 25.16 KG/M2 | DIASTOLIC BLOOD PRESSURE: 66 MMHG | WEIGHT: 124.8 LBS

## 2025-01-15 DIAGNOSIS — E78.5 HYPERLIPIDEMIA LDL GOAL <70: ICD-10-CM

## 2025-01-15 DIAGNOSIS — I25.118 CORONARY ARTERY DISEASE INVOLVING NATIVE CORONARY ARTERY OF NATIVE HEART WITH OTHER FORM OF ANGINA PECTORIS: Primary | ICD-10-CM

## 2025-01-15 DIAGNOSIS — Z72.0 TOBACCO ABUSE: ICD-10-CM

## 2025-01-15 DIAGNOSIS — I48.0 PAROXYSMAL ATRIAL FIBRILLATION: ICD-10-CM

## 2025-01-15 DIAGNOSIS — I10 ESSENTIAL HYPERTENSION: ICD-10-CM

## 2025-01-15 RX ORDER — QUETIAPINE FUMARATE 100 MG/1
100 TABLET, FILM COATED ORAL
COMMUNITY
Start: 2025-01-13 | End: 2026-01-14

## 2025-01-15 NOTE — PROGRESS NOTES
Chief Complaint  Coronary artery disease involving native coronary artery of (6 month follow up)    Subjective        History of Present Illness  Thi Lynn presents to [facility] CARDIOLOGY for follow up.   Patient is a 74-year-old female with past medical history outlined below, significant for hypertension, hyperlipidemia, CAD status post CABG x 3 in 2008, COPD, paroxysmal atrial fibrillation who presents for routine follow-up.  She is doing well.  She really has no complaints or concerns today.  She denies any palpitations.  She has no chest pain or discomfort.  She denies any dyspnea, orthopnea, edema or syncope.    Past Medical History:   Diagnosis Date    Abnormal positron emission tomography (PET) scan     Asthma     Chest pain     FOLLOWED BY DR HEIN/ [facility]. DENIES CP/SOA.  DECREASED ACTIVITY D/T BILATERAL LEG PAIN    COPD (chronic obstructive pulmonary disease)     Coronary artery disease     Diabetes mellitus     REPORTS DOES NOT HAVE WORKING GLUCOMETER  AT THIS TIME    Elevated cholesterol     Hyperlipemia     Hypertension     Limb swelling     Lung nodules     Myocardial infarction     REPORTS HAD 3 VESSEL BYPASS    Pain management     Seasonal allergies     Stomach disorder     Stroke     ABOUT 3-4 YEARS AGO       ALLERGY  Allergies   Allergen Reactions    Morphine Swelling and Rash    Oxycodone-Acetaminophen Shortness Of Breath and Mental Status Change    Statins Myalgia    Sulfa Antibiotics Unknown - Low Severity     REPORTS HAPPENED AS CHILD AND UNSURE OF REACTION        Past Surgical History:   Procedure Laterality Date    BRONCHOSCOPY Bilateral 12/29/2023    Procedure: BRONCHOSCOPY BIOPSY WITH ANESTHESIA, BAL;  Surgeon: Shane Tamez DO;  Location: [facility];  Service: Pulmonary;  Laterality: Bilateral;    CARDIAC CATHETERIZATION N/A 5/3/2023    Procedure: Left Heart Cath with possible coronary angioplasty;  Surgeon: Jason Hein MD;   Location: [facility] CATH INVASIVE LOCATION;  Service: Cardiology;  Laterality: N/A;    CATARACT EXTRACTION      Cataract Surgery    CHOLECYSTECTOMY      COLONOSCOPY  2019    COLONOSCOPY N/A 12/5/2023    Procedure: COLONOSCOPY WITH COLD SNARE POLYPECTOMY;  Surgeon: Josue العلي MD;  Location: [facility] ENDOSCOPY;  Service: Gastroenterology;  Laterality: N/A;  COLON POLYP, DIVERTICULOSIS    CORONARY ARTERY BYPASS GRAFT  2009    3v    ENDOSCOPY      + 10 years [facility]ill in [place]    HYSTERECTOMY          Social History     Socioeconomic History    Marital status:    Tobacco Use    Smoking status: Every Day     Current packs/day: 0.50     Average packs/day: 0.5 packs/day for 54.0 years (27.0 ttl pk-yrs)     Types: Cigarettes     Start date: 1971     Passive exposure: Current    Smokeless tobacco: Never    Tobacco comments:     5 cigarettes daily as of 1/30/2024 - AL    Vaping Use    Vaping status: Never Used   Substance and Sexual Activity    Alcohol use: Never    Drug use: Never       Family History   Problem Relation Age of Onset    Heart disease Father     Cancer Father         Unspecified    Heart disease Brother     Colon polyps Mother         Current Outpatient Medications on File Prior to Visit   Medication Sig    albuterol sulfate  (90 Base) MCG/ACT inhaler 2 puffs Every 4 (Four) Hours As Needed for Wheezing or Shortness of Air.    amLODIPine (NORVASC) 10 MG tablet Take 1 tablet by mouth Daily.    aspirin 81 MG EC tablet Take 1 tablet by mouth Daily.    carvedilol (COREG) 3.125 MG tablet Take 1 tablet by mouth Every 12 (Twelve) Hours for 360 days.    Eliquis 5 MG tablet tablet TAKE 1 TABLET (5 MG TOTAL) BY MOUTH 2 (TWO) TIMES DAILY INDICATIONS: ATRIAL FIBRILLATION.    ezetimibe (ZETIA) 10 MG tablet Take 1 tablet by mouth Daily.    fluticasone (FLONASE) 50 MCG/ACT nasal spray INSTILL ONE SPRAY IN EACH NOSTRIL DAILY    HYDROcodone-acetaminophen (NORCO)  MG per tablet Take 1 tablet by  "mouth Every 4 (Four) Hours As Needed.    lidocaine (LIDODERM) 5 % APPLY 1 PATCH BY TOPICAL ROUTE ONCE DAILY (MAY WEAR UP TO 12HOURS.)    metFORMIN ER (GLUCOPHAGE-XR) 500 MG 24 hr tablet Take 1 tablet by mouth Daily With Breakfast. INSTRUCTED NONE AFTER 6 PMN ON 12/28/23 PER ANESTHESIA PROTOCOL    nitroglycerin (NITRODUR) 0.2 MG/HR patch Place 1 patch on the skin as directed by provider Daily.    nitroglycerin (NITROSTAT) 0.4 MG SL tablet 1 under the tongue as needed for angina, may repeat q5mins for up three doses    pantoprazole (PROTONIX) 40 MG EC tablet Take 1 tablet by mouth Daily.    QUEtiapine (SEROquel) 100 MG tablet Take 1 tablet by mouth.    Evolocumab (REPATHA) solution prefilled syringe injection Inject 1 mL under the skin into the appropriate area as directed Every 14 (Fourteen) Days. (Patient not taking: Reported on 7/15/2024)    traZODone (DESYREL) 150 MG tablet Take 0.5 tablets by mouth Every Night. TAKES HALF A TABLET (Patient not taking: Reported on 11/27/2024)    varenicline (Chantix Continuing Month Wilber) 1 MG tablet Take 1 tablet by mouth 2 (Two) Times a Day. (Patient not taking: Reported on 1/15/2025)    [DISCONTINUED] metoprolol tartrate (LOPRESSOR) 50 MG tablet Take 0.5 tablets by mouth.     No current facility-administered medications on file prior to visit.       Objective   Vitals:    01/15/25 1105   BP: 137/66   BP Location: Left arm   Patient Position: Sitting   Cuff Size: Adult   Pulse: 60   SpO2: 98%   Weight: 56.6 kg (124 lb 12.8 oz)   Height: 149.9 cm (59\")       Physical Exam  Constitutional:       General: She is awake. She is not in acute distress.     Appearance: Normal appearance.   HENT:      Head: Normocephalic.      Nose: Nose normal. No congestion.   Eyes:      Extraocular Movements: Extraocular movements intact.      Conjunctiva/sclera: Conjunctivae normal.      Pupils: Pupils are equal, round, and reactive to light.   Neck:      Thyroid: No thyromegaly.      Vascular: No " JVD.   Cardiovascular:      Rate and Rhythm: Normal rate and regular rhythm.      Chest Wall: PMI is not displaced.      Pulses: Normal pulses.      Heart sounds: Normal heart sounds, S1 normal and S2 normal. No murmur heard.     No friction rub. No gallop. No S3 or S4 sounds.   Pulmonary:      Effort: Pulmonary effort is normal.      Breath sounds: Normal breath sounds. No wheezing, rhonchi or rales.   Abdominal:      General: Bowel sounds are normal.      Palpations: Abdomen is soft.      Tenderness: There is no abdominal tenderness.   Musculoskeletal:      Cervical back: No tenderness.      Right lower leg: No edema.      Left lower leg: No edema.   Lymphadenopathy:      Cervical: No cervical adenopathy.   Skin:     General: Skin is warm and dry.      Capillary Refill: Capillary refill takes less than 2 seconds.      Coloration: Skin is not cyanotic.      Findings: No petechiae or rash.      Nails: There is no clubbing.   Neurological:      Mental Status: She is alert.   Psychiatric:         Mood and Affect: Mood normal.         Behavior: Behavior is cooperative.           Result Review     The following data was reviewed by ILENE Serrano on 01/15/25.               Results for orders placed during the hospital encounter of 01/01/24    Adult Transthoracic Echo Complete W/ Cont if Necessary Per Protocol    Interpretation Summary    Left ventricular systolic function is normal. Left ventricular ejection fraction appears to be 56 - 60%.    Left ventricular wall thickness is consistent with mild concentric hypertrophy.    Mild mitral valve regurgitation is present.    Estimated right ventricular systolic pressure from tricuspid regurgitation is normal (<35 mmHg).    Results for orders placed during the hospital encounter of 04/20/23    Stress Test With Myocardial Perfusion One Day    Interpretation Summary    Left ventricular ejection fraction is hyperdynamic (Calculated EF > 70%).    Stress  electrocardiogram showed no ischemia.    Myocardial perfusion images show a small reversible apical defect.    Feel this represents a mildly abnormal stress test with the very small area of possible apical ischemia.  Would consider a trial of antianginal's and if not able to to make her asymptomatic then could consider left heart cath.    No results found for this or any previous visit.          Procedures      Assessment & Plan  Coronary artery disease involving native coronary artery of native heart with other form of angina pectoris  Status post previous CABG x 3 with heart catheterization in May 2023 that showed 2 out of 2 patent grafts and no other significant disease.  Essential hypertension  Well-controlled on current regimen which will be continued.  Paroxysmal atrial fibrillation  Continue Eliquis 5 mg twice daily for stroke risk reduction.  We did discuss the option of switching to warfarin if Eliquis continues to be cost prohibitive for her.  Hyperlipidemia LDL goal <70  Repatha has become cost prohibitive for her.  Will reach out to our nurse to see if Praluent is a more affordable option.  Can also consider Leqvio.  She is statin intolerant.  Tobacco abuse  Tobacco cessation is advised.  The medical services provided during this encounter are part of ongoing care related to this patient's single serious condition or complex condition.  Follow Up   Return in about 6 months (around 7/15/2025) for With Dr. Nichols.    Patient was given instructions and counseling regarding her condition or for health maintenance advice. Please see specific information pulled into the AVS if appropriate.     Mackenzie Melara, APRN  01/15/25  11:12 EST    Dictated Utilizing Dragon Dictation

## 2025-01-15 NOTE — ASSESSMENT & PLAN NOTE
Continue Eliquis 5 mg twice daily for stroke risk reduction.  We did discuss the option of switching to warfarin if Eliquis continues to be cost prohibitive for her.

## 2025-01-15 NOTE — ASSESSMENT & PLAN NOTE
Repatha has become cost prohibitive for her.  Will reach out to our nurse to see if Praluent is a more affordable option.  Can also consider Leqvio.  She is statin intolerant.

## 2025-01-15 NOTE — ASSESSMENT & PLAN NOTE
Status post previous CABG x 3 with heart catheterization in May 2023 that showed 2 out of 2 patent grafts and no other significant disease.

## 2025-01-16 ENCOUNTER — SPECIALTY PHARMACY (OUTPATIENT)
Dept: OTHER | Facility: HOSPITAL | Age: 75
End: 2025-01-16
Payer: MEDICARE

## 2025-01-16 ENCOUNTER — TELEPHONE (OUTPATIENT)
Dept: CARDIOLOGY | Facility: CLINIC | Age: 75
End: 2025-01-16
Payer: MEDICARE

## 2025-01-16 NOTE — TELEPHONE ENCOUNTER
[facility] received a fax that requires your attention. The document has been indexed to the patient’s chart for your review.      Reason for sending: EXTERNAL MEDICAL RECORD NOTIFICATION     Documents Description: REPATHA [id number]    Name of Sender: [facility]     Date Indexed: 1.16.25

## 2025-01-17 ENCOUNTER — SPECIALTY PHARMACY (OUTPATIENT)
Dept: OTHER | Facility: HOSPITAL | Age: 75
End: 2025-01-17
Payer: MEDICARE

## 2025-01-17 NOTE — PROGRESS NOTES
Specialty Pharmacy Patient Management Program  Cardiology Initial Assessment     Thi Lynn was referred by a Cardiology provider to the Cardiology Patient Management program offered by [facility] for Hyperlipidemia on 01/17/25.  An initial outreach was conducted, including assessment of therapy appropriateness and specialty medication education for Repatha. The patient was introduced to services offered by [facility], including: regular assessments, refill coordination, mail order delivery options, prior authorization maintenance, and financial assistance programs as applicable. The patient was also provided with contact information for the pharmacy team.     Insurance Coverage & Financial Support  Medicare and [facility] Hyperlipidemia [facility]     Relevant Past Medical History and Comorbidities  Relevant medical history and concomitant health conditions were discussed with the patient. The patient's chart has been reviewed for relevant past medical history and comorbid conditions and updated as necessary.  Past Medical History:   Diagnosis Date    Abnormal positron emission tomography (PET) scan     Asthma     Chest pain     FOLLOWED BY DR HEIN/ [facility]. DENIES CP/SOA.  DECREASED ACTIVITY D/T BILATERAL LEG PAIN    COPD (chronic obstructive pulmonary disease)     Coronary artery disease     Diabetes mellitus     REPORTS DOES NOT HAVE WORKING GLUCOMETER  AT THIS TIME    Elevated cholesterol     Hyperlipemia     Hypertension     Limb swelling     Lung nodules     Myocardial infarction     REPORTS HAD 3 VESSEL BYPASS    Pain management     Seasonal allergies     Stomach disorder     Stroke     ABOUT 3-4 YEARS AGO     Social History     Socioeconomic History    Marital status:    Tobacco Use    Smoking status: Every Day     Current packs/day: 0.50     Average packs/day: 0.5 packs/day for 54.0 years (27.0 ttl pk-yrs)     Types: Cigarettes     Start date: 1971      Passive exposure: Current    Smokeless tobacco: Never    Tobacco comments:     5 cigarettes daily as of 1/30/2024 - [place]    Vaping Use    Vaping status: Never Used   Substance and Sexual Activity    Alcohol use: Never    Drug use: Never       Problem list reviewed by Milka Jones RPH on 1/17/2025 at 10:31 AM    Allergies  Known allergies and reactions were discussed with the patient. The patient's chart has been reviewed for  allergy information and updated as necessary.   Allergies   Allergen Reactions    Morphine Swelling and Rash    Oxycodone-Acetaminophen Shortness Of Breath and Mental Status Change    Statins Myalgia    Sulfa Antibiotics Unknown - Low Severity     REPORTS HAPPENED AS CHILD AND UNSURE OF REACTION       Allergies reviewed by Milka Jones RPH on 1/17/2025 at 10:31 AM    Relevant Laboratory Values  Relevant laboratory values were discussed with the patient. The following specialty medication dose adjustment(s) are recommended: Repatha    Lab Results   Component Value Date    GLUCOSE 138 (H) 01/02/2024    CALCIUM 7.9 (L) 01/02/2024     01/02/2024    K 3.9 01/02/2024    CO2 19.8 (L) 01/02/2024     (H) 01/02/2024    BUN 4 (L) 01/02/2024    CREATININE 0.50 (L) 01/02/2024    EGFRIFNONA 95 08/31/2021    BCR 8.0 01/02/2024    ANIONGAP 9.2 01/02/2024     Lab Results   Component Value Date    CHOL 268 (H) 08/31/2021    CHLPL 139 10/02/2019    TRIG 185 (H) 08/31/2021    HDL 44 08/31/2021     (H) 08/31/2021         Current Medication List  This medication list has been reviewed with the patient and evaluated for any interactions or necessary modifications/recommendations, and updated to include all prescription medications, OTC medications, and supplements the patient is currently taking.  This list reflects what is contained in the patient's profile, which has also been marked as reviewed to communicate to other providers it is the most up to date version of  the patient's current medication therapy.     Current Outpatient Medications:     Evolocumab (REPATHA) solution prefilled syringe injection, Inject 1 mL under the skin into the appropriate area as directed Every 14 (Fourteen) Days., Disp: 6 each, Rfl: 3    albuterol sulfate  (90 Base) MCG/ACT inhaler, 2 puffs Every 4 (Four) Hours As Needed for Wheezing or Shortness of Air., Disp: , Rfl:     amLODIPine (NORVASC) 10 MG tablet, Take 1 tablet by mouth Daily., Disp: 90 tablet, Rfl: 3    aspirin 81 MG EC tablet, Take 1 tablet by mouth Daily., Disp: , Rfl:     carvedilol (COREG) 3.125 MG tablet, Take 1 tablet by mouth Every 12 (Twelve) Hours for 360 days., Disp: 180 tablet, Rfl: 3    Eliquis 5 MG tablet tablet, TAKE 1 TABLET (5 MG TOTAL) BY MOUTH 2 (TWO) TIMES DAILY INDICATIONS: ATRIAL FIBRILLATION., Disp: , Rfl:     ezetimibe (ZETIA) 10 MG tablet, Take 1 tablet by mouth Daily., Disp: 90 tablet, Rfl: 3    fluticasone (FLONASE) 50 MCG/ACT nasal spray, INSTILL ONE SPRAY IN EACH NOSTRIL DAILY, Disp: , Rfl:     HYDROcodone-acetaminophen (NORCO)  MG per tablet, Take 1 tablet by mouth Every 4 (Four) Hours As Needed., Disp: , Rfl:     lidocaine (LIDODERM) 5 %, APPLY 1 PATCH BY TOPICAL ROUTE ONCE DAILY (MAY WEAR UP TO 12HOURS.), Disp: , Rfl:     metFORMIN ER (GLUCOPHAGE-XR) 500 MG 24 hr tablet, Take 1 tablet by mouth Daily With Breakfast. INSTRUCTED NONE AFTER 6 PMN ON 12/28/23 PER ANESTHESIA PROTOCOL, Disp: , Rfl:     nitroglycerin (NITRODUR) 0.2 MG/HR patch, Place 1 patch on the skin as directed by provider Daily., Disp: 30 patch, Rfl: 11    nitroglycerin (NITROSTAT) 0.4 MG SL tablet, 1 under the tongue as needed for angina, may repeat q5mins for up three doses, Disp: 100 tablet, Rfl: 11    pantoprazole (PROTONIX) 40 MG EC tablet, Take 1 tablet by mouth Daily., Disp: , Rfl:     QUEtiapine (SEROquel) 100 MG tablet, Take 1 tablet by mouth., Disp: , Rfl:     traZODone (DESYREL) 150 MG tablet, Take 0.5 tablets by mouth  Every Night. TAKES HALF A TABLET (Patient not taking: Reported on 11/27/2024), Disp: , Rfl:     varenicline (Chantix Continuing Month Wilber) 1 MG tablet, Take 1 tablet by mouth 2 (Two) Times a Day. (Patient not taking: Reported on 1/15/2025), Disp: 60 tablet, Rfl: 3    Medicines reviewed by Milka Jones RP on 1/17/2025 at 10:31 AM    Drug Interactions  None    Initial Education Provided for Specialty Medication  The patient has been provided with the following education and any applicable administration techniques (i.e. self-injection) have been demonstrated for the therapies indicated. All questions and concerns have been addressed prior to the patient receiving the medication, and the patient has verbalized comprehension of the education and any materials provided. Additional patient education shall be provided and documented upon request by the patient, provider, or payer.    REPATHA® (evolocumab)  Medication Expectations   Why am I taking this medication? You are taking Repatha to lower your “bad” cholesterol (LDL-C). This medication can be used in adults with high blood cholesterol including primary hyperlipidemia and familial hypercholesterolemia.    What should I expect while on this medication? You should expect to see your cholesterol improve over time. Specifically, you should see your LDL-C decrease.    How does the medication work? Repatha works by blocking a protein called PCSK9 that contributes to high levels of bad cholesterol. It helps increase your liver's ability to remove bad cholesterol from your blood.     How long will I be on this medication for? The amount of time you will be on this medication will be determined by your doctor based on your cholesterol and/or your risk of having a cardiac event. You will most likely be on this medication or another cholesterol medication throughout your lifetime. Do not abruptly stop this medication without talking to your doctor first.    How  do I take this medication? Take as directed on your prescription label. Repatha is injected under the skin (subcutaneously) of your stomach, thigh, or upper arm. This medication is usually given one or twice a month.   What are some possible side effects? The most common side effects of Repatha include redness, itching, swelling, or pain/tenderness at the injection site, symptoms of the common cold, flu or flu-like symptoms or back pain.    What happens if I miss a dose? If you miss a dose, take it as soon as you remember if it is within 7 days from the usual day of administration then resume your original schedule. If it is beyond 7 days and you use the nayla-2-week dose, skip the missed dose and resume your normal dosing schedule.If it is beyond 7 days and you use the once-monthly dose, inject the dose and start a new schedule based on that date.      Medication Safety   What are things I should warn my doctor immediately about? Talk to your doctor if you are pregnant, planning to become pregnant, or breastfeeding. Stop the medication and tell your doctor or seek emergency medical help if you notice any signs/symptoms of an allergic reaction (severe rash, redness, hives, severe itching, trouble breathing, or swelling of the face, lips, or tongue). If you have a rubber or latex allergy, you should not use the Repatha SureClick® Autoinjector pen or the prefilled syringe, please notify your doctor or pharmacist.   What are things that I should be cautious of? Be cautious of any side effects from this medication. Talk to your doctor if any new ones develop or aren't getting better.   What are some medications that can interact with this one? There are no known significant drug interactions with Repatha. Always tell your doctor or pharmacist immediately if you start taking any new medications, including over-the-counter medications, vitamins, and herbal supplements.      Medication Storage/Handling   How should I handle  this medication? Do not shake or expose the pens, cartridges, or syringes to extreme heat or direct sunlight. Keep this medication out of reach of pets/children. Allow medication to warm at room temperature prior to administration.   How does this medication need to be stored? Store unused pens, cartridges, or syringes in the refrigerator in the original cartons to protect from light. If needed, Repatha may be kept at room temperature in the original carton for up to 30 days. Do not freeze.    How should I dispose of this medication? All the Repatha devices are single-dose and should be discarded in a sharps container after use. If your doctor decides to stop this medication, take to your local police station for proper disposal. Some pharmacies also have take-back bins for medication drop-off.      Resources/Support   How can I remind myself to take this medication? You can download reminder apps to help you manage your refills. You may also set an alarm on your phone to remind you to take your dose.    Is financial support available?  [employer] can provide co-pay cards if you have commercial insurance or patient assistance if you have Medicare or no insurance.    Which vaccines are recommended for me? Talk to your doctor about these vaccines: Flu, Coronavirus (COVID-19), Pneumococcal (pneumonia), Tdap, Hepatitis B, Zoster (shingles)          Adherence and Self-Administration  Adherence related to the patient's specialty therapy was discussed with the patient. The Adherence segment of this outreach has been reviewed and updated.     Is there a concern with patient's ability to self administer the medication correctly and without issue?: No  Were any potential barriers to adherence identified during the initial assessment or patient education?: No  Are there any concerns regarding the patient's understanding of the importance of medication adherence?: No  Methods for Supporting Patient Adherence and/or  Self-Administration: None    Open Medication Therapy Problems  No medication therapy recommendations to display    Goals of Therapy  Goals related to the patient's specialty therapy were discussed with the patient. The Patient Goals segment of this outreach has been reviewed and updated.   Goals Addressed Today        Specialty Pharmacy General Goal      LDL Goal < 70 mg/dL    Lab Results   Component Value Date     (H) 08/31/2021    LDL 48 (L) 10/02/2019                   Reassessment Plan & Follow-Up  1. Medication Therapy Changes: Repatha 140mg subq every 14 days  2. Related Plans, Therapy Recommendations, or Therapy Problems to Be Addressed: None  3. Pharmacist to perform regular assessments no more than (6) months from the previous assessment.  4. Care Coordinator to set up future refill outreaches, coordinate prescription delivery, and escalate clinical questions to pharmacist.  5. Welcome information and patient satisfaction survey to be sent by specialty pharmacy team with patient's initial fill.    Attestation  Therapeutic appropriateness: Appropriate   I attest the patient was actively involved in and has agreed to the above plan of care. If the prescribed therapy is at any point deemed not appropriate based on the current or future assessments, a consultation will be initiated with the patient's specialty care provider to determine the best course of action. The revised plan of therapy will be documented along with any required assessments and/or additional patient education provided.     Milka Faulkner, Sanjay  Clinical Specialty Pharmacist, Cardiology  1/17/2025  10:41 EST

## 2025-01-20 ENCOUNTER — TELEPHONE (OUTPATIENT)
Dept: CARDIOLOGY | Facility: CLINIC | Age: 75
End: 2025-01-20
Payer: MEDICARE

## 2025-01-20 ENCOUNTER — SPECIALTY PHARMACY (OUTPATIENT)
Facility: HOSPITAL | Age: 75
End: 2025-01-20
Payer: MEDICARE

## 2025-01-20 NOTE — TELEPHONE ENCOUNTER
Called ptzoie, for a return call.      Pt returned my call.  She stated that the cost of Eliquis monthly is $45.  She said at this time, she does not need assistance.  Pt stated if the cost increases and becomes unaffordable, she will call our office and we can start the process.

## 2025-01-20 NOTE — PROGRESS NOTES
First fill with us. Verify shipping address, preferred method of contact, and payment method.     Repatha 140mg SUBQ every 14 days (84 day supply, [facility], $0 copay for patient.)    Milka Faulkner Pharm.D.  Clinical Specialty Pharmacist, Cardiology  01/20/2025  09:04 AM

## 2025-03-05 ENCOUNTER — LAB (OUTPATIENT)
Dept: LAB | Facility: HOSPITAL | Age: 75
End: 2025-03-05
Payer: MEDICARE

## 2025-03-05 DIAGNOSIS — E78.5 HYPERLIPIDEMIA LDL GOAL <70: ICD-10-CM

## 2025-03-05 LAB
CHOLEST SERPL-MCNC: 144 MG/DL (ref 0–200)
HDLC SERPL-MCNC: 57 MG/DL (ref 40–60)
LDLC SERPL CALC-MCNC: 64 MG/DL (ref 0–100)
LDLC/HDLC SERPL: 1.07 {RATIO}
TRIGL SERPL-MCNC: 129 MG/DL (ref 0–150)
VLDLC SERPL-MCNC: 23 MG/DL (ref 5–40)

## 2025-03-05 PROCEDURE — 80061 LIPID PANEL: CPT

## 2025-03-05 PROCEDURE — 36415 COLL VENOUS BLD VENIPUNCTURE: CPT

## 2025-04-09 ENCOUNTER — SPECIALTY PHARMACY (OUTPATIENT)
Dept: CARDIOLOGY | Facility: CLINIC | Age: 75
End: 2025-04-09
Payer: MEDICARE

## 2025-04-09 ENCOUNTER — SPECIALTY PHARMACY (OUTPATIENT)
Facility: HOSPITAL | Age: 75
End: 2025-04-09
Payer: MEDICARE

## 2025-04-09 RX ORDER — EVOLOCUMAB 140 MG/ML
140 INJECTION, SOLUTION SUBCUTANEOUS
Qty: 6 ML | Refills: 3 | Status: SHIPPED | OUTPATIENT
Start: 2025-04-09

## 2025-04-09 NOTE — PROGRESS NOTES
New Rx Cardiology      A prescription was released to [facility] for   Drug: Repatha  Strength: 140 mg  Directions: Inject 140mg SUBQ every 14 days  Quantity: 6  Refills: 3      Milka Pedro Faulkner Pharm.D.    4/9/2025  09:21 EDT

## 2025-04-09 NOTE — PROGRESS NOTES
Specialty Pharmacy Patient Management Program  Refill Outreach     Thi was contacted today regarding refills of their medication(s).    Refill Questions      Flowsheet Row Most Recent Value   Changes to allergies? No   Changes to medications? No   New conditions or infections since last clinic visit No   Unplanned office visit, urgent care, ED, or hospital admission in the last 4 weeks  No   How does patient/caregiver feel medication is working? Good   Financial problems or insurance changes  No   Since the previous refill, were any specialty medication doses or scheduled injections missed or delayed?  No   Does this patient require a clinical escalation to a pharmacist? No            Delivery Questions      Flowsheet Row Most Recent Value   Delivery method UPS   Delivery address verified with patient/caregiver? Yes   Delivery address Home   Number of medications in delivery 1   Medication(s) being filled and delivered Evolocumab (Repatha SureClick)   Doses left of specialty medications 0   Copay verified? Yes   Copay amount $0.00   Copay form of payment No copayment ($0)   Delivery Date Selection 04/10/25   Signature Required No   Do you consent to receive electronic handouts?  No                 Follow-up: 80 day(s)     Maldonado Carpenter, Pharmacy Technician  4/9/2025  09:43 EDT

## 2025-06-26 ENCOUNTER — SPECIALTY PHARMACY (OUTPATIENT)
Dept: CARDIOLOGY | Facility: CLINIC | Age: 75
End: 2025-06-26
Payer: MEDICARE

## 2025-06-26 NOTE — PROGRESS NOTES
Specialty Pharmacy Patient Management Program  Refill Outreach     Thi was contacted today regarding refills of their medication(s).    Refill Questions      Flowsheet Row Most Recent Value   Changes to allergies? No   Changes to medications? No   New conditions or infections since last clinic visit No   Unplanned office visit, urgent care, ED, or hospital admission in the last 4 weeks  No   How does patient/caregiver feel medication is working? Good   Financial problems or insurance changes  No   Since the previous refill, were any specialty medication doses or scheduled injections missed or delayed?  No   Does this patient require a clinical escalation to a pharmacist? No            Delivery Questions      Flowsheet Row Most Recent Value   Delivery method UPS   Delivery address verified with patient/caregiver? Yes   Delivery address Home   Number of medications in delivery 1   Medication(s) being filled and delivered Evolocumab (Repatha SureClick)   Doses left of specialty medications 0   Copay verified? Yes   Copay amount $0.00   Copay form of payment No copayment ($0)   Delivery Date Selection 06/27/25   Signature Required No   Do you consent to receive electronic handouts?  No                 Follow-up: 82 day(s)     Maldonado Carpenter, Pharmacy Technician  6/26/2025  11:59 EDT

## 2025-07-10 RX ORDER — AMLODIPINE BESYLATE 10 MG/1
10 TABLET ORAL DAILY
Qty: 90 TABLET | Refills: 11 | Status: SHIPPED | OUTPATIENT
Start: 2025-07-10

## 2025-07-15 ENCOUNTER — SPECIALTY PHARMACY (OUTPATIENT)
Facility: HOSPITAL | Age: 75
End: 2025-07-15
Payer: MEDICARE

## 2025-07-15 NOTE — PROGRESS NOTES
Specialty Pharmacy Patient Management Program  Cardiology Reassessment     Thi Lynn was referred by a Cardiology provider to the Cardiology Patient Management program offered by [facility] for Hyperlipidemia. A follow-up outreach was conducted, including assessment of continued therapy appropriateness, medication adherence, and side effect incidence and management for Repatha.    Changes to Insurance Coverage or Financial Support  None -- [employer]    Relevant Past Medical History and Comorbidities  Relevant medical history and concomitant health conditions were discussed with the patient. The patient's chart has been reviewed for relevant past medical history and comorbid health conditions and updated as necessary.   Past Medical History:   Diagnosis Date    Abnormal positron emission tomography (PET) scan     Asthma     Chest pain     FOLLOWED BY DR HEIN/ [facility]. DENIES CP/SOA.  DECREASED ACTIVITY D/T BILATERAL LEG PAIN    COPD (chronic obstructive pulmonary disease)     Coronary artery disease     Diabetes mellitus     REPORTS DOES NOT HAVE WORKING GLUCOMETER  AT THIS TIME    Elevated cholesterol     Hyperlipemia     Hypertension     Limb swelling     Lung nodules     Myocardial infarction     REPORTS HAD 3 VESSEL BYPASS    Pain management     Seasonal allergies     Stomach disorder     Stroke     ABOUT 3-4 YEARS AGO     Social History     Socioeconomic History    Marital status:    Tobacco Use    Smoking status: Every Day     Current packs/day: 0.50     Average packs/day: 0.5 packs/day for 54.5 years (27.3 ttl pk-yrs)     Types: Cigarettes     Start date: 1971     Passive exposure: Current    Smokeless tobacco: Never    Tobacco comments:     5 cigarettes daily as of 1/30/2024 - [place]    Vaping Use    Vaping status: Never Used   Substance and Sexual Activity    Alcohol use: Never    Drug use: Never     Problem list reviewed by Milka Jones, [facility] on  7/15/2025 at 11:08 AM    Hospitalizations and Urgent Care Since Last Assessment  ED Visits, Admissions, or Hospitalizations: None  Urgent Office Visits: None    Allergies  Known allergies and reactions were discussed with the patient. The patient's chart has been reviewed for allergy information and updated as necessary.   Allergies   Allergen Reactions    Morphine Swelling and Rash    Oxycodone-Acetaminophen Shortness Of Breath and Mental Status Change    Statins Myalgia    Sulfa Antibiotics Unknown - Low Severity     REPORTS HAPPENED AS CHILD AND UNSURE OF REACTION     Allergies reviewed by Milka Jones [facility] on 7/15/2025 at 11:08 AM    Relevant Laboratory Values  Relevant laboratory values were discussed with the patient. The following specialty medication dose adjustment(s) are recommended: continue Repatha    Lab Results   Component Value Date    GLUCOSE 138 (H) 01/02/2024    CALCIUM 7.9 (L) 01/02/2024     01/02/2024    K 3.9 01/02/2024    CO2 19.8 (L) 01/02/2024     (H) 01/02/2024    BUN 4 (L) 01/02/2024    CREATININE 0.50 (L) 01/02/2024    EGFRIFNONA 95 08/31/2021    BCR 8.0 01/02/2024    ANIONGAP 9.2 01/02/2024     Lab Results   Component Value Date    CHOL 144 03/05/2025    CHLPL 139 10/02/2019    TRIG 129 03/05/2025    HDL 57 03/05/2025    LDL 64 03/05/2025       Current Medication List  This medication list has been reviewed with the patient and evaluated for any interactions or necessary modifications/recommendations, and updated to include all prescription medications, OTC medications, and supplements the patient is currently taking.  This list reflects what is contained in the patient's profile, which has also been marked as reviewed to communicate to other providers it is the most up to date version of the patient's current medication therapy.     Current Outpatient Medications:     albuterol sulfate  (90 Base) MCG/ACT inhaler, 2 puffs Every 4 (Four) Hours As Needed for  Wheezing or Shortness of Air., Disp: , Rfl:     amLODIPine (NORVASC) 10 MG tablet, TAKE 1 TABLET BY MOUTH DAILY., Disp: 90 tablet, Rfl: 11    aspirin 81 MG EC tablet, Take 1 tablet by mouth Daily., Disp: , Rfl:     carvedilol (COREG) 3.125 MG tablet, Take 1 tablet by mouth Every 12 (Twelve) Hours for 360 days., Disp: 180 tablet, Rfl: 3    Eliquis 5 MG tablet tablet, TAKE 1 TABLET (5 MG TOTAL) BY MOUTH 2 (TWO) TIMES DAILY INDICATIONS: ATRIAL FIBRILLATION., Disp: , Rfl:     Evolocumab (Repatha SureClick) solution auto-injector SureClick injection, Inject 1 mL under the skin into the appropriate area as directed Every 14 (Fourteen) Days., Disp: 6 mL, Rfl: 3    ezetimibe (ZETIA) 10 MG tablet, Take 1 tablet by mouth Daily., Disp: 90 tablet, Rfl: 3    fluticasone (FLONASE) 50 MCG/ACT nasal spray, INSTILL ONE SPRAY IN EACH NOSTRIL DAILY, Disp: , Rfl:     HYDROcodone-acetaminophen (NORCO)  MG per tablet, Take 1 tablet by mouth Every 4 (Four) Hours As Needed., Disp: , Rfl:     lidocaine (LIDODERM) 5 %, APPLY 1 PATCH BY TOPICAL ROUTE ONCE DAILY (MAY WEAR UP TO 12HOURS.), Disp: , Rfl:     metFORMIN ER (GLUCOPHAGE-XR) 500 MG 24 hr tablet, Take 1 tablet by mouth Daily With Breakfast. INSTRUCTED NONE AFTER 6 PMN ON 12/28/23 PER ANESTHESIA PROTOCOL, Disp: , Rfl:     nitroglycerin (NITRODUR) 0.2 MG/HR patch, Place 1 patch on the skin as directed by provider Daily., Disp: 30 patch, Rfl: 11    nitroglycerin (NITROSTAT) 0.4 MG SL tablet, 1 under the tongue as needed for angina, may repeat q5mins for up three doses, Disp: 100 tablet, Rfl: 11    pantoprazole (PROTONIX) 40 MG EC tablet, Take 1 tablet by mouth Daily., Disp: , Rfl:     QUEtiapine (SEROquel) 100 MG tablet, Take 1 tablet by mouth., Disp: , Rfl:     traZODone (DESYREL) 150 MG tablet, Take 0.5 tablets by mouth Every Night. TAKES HALF A TABLET (Patient not taking: Reported on 11/27/2024), Disp: , Rfl:     varenicline (Chantix Continuing Month Pak) 1 MG tablet, Take 1  tablet by mouth 2 (Two) Times a Day. (Patient not taking: Reported on 1/15/2025), Disp: 60 tablet, Rfl: 3    Medicines reviewed by Milka Jones [facility] on 7/15/2025 at 11:08 AM    Drug Interactions  None    Adverse Drug Reactions  Medication tolerability: Tolerating with no to minimal ADRs  Medication plan: Continue therapy with normal follow-up  Plan for ADR Management: None    Adherence, Self-Administration, and Current Therapy Problems  Adherence related to the patient's specialty therapy was discussed with the patient. The Adherence segment of this outreach has been reviewed and updated.     Adherence Questions  Linked Medication(s) Assessed: Evolocumab (Repatha SureClick)  On average, how many doses/injections does the patient miss per month?: 0  What are the identified reasons for non-adherence or missed doses? : no problems identified  What is the estimated medication adherence level?: <70% (Low adherence score due to not being compliant prior to [facility] taking over rx. Since we started filling her Repatha she has not missed a dose.)  Based on the patient/caregiver response and refill history, does this patient require an MTP to track adherence improvements?: no    Additional Barriers to Patient Self-Administration: None  Methods for Supporting Patient Self-Administration: None    Open Medication Therapy Problems  No medication therapy recommendations to display    Goals of Therapy  Goals related to the patient's specialty therapy were discussed with the patient. The Patient Goals segment of this outreach has been reviewed and updated.   Goals Addressed Today        Specialty Pharmacy General Goal      LDL Goal < 70 mg/dL    Lab Results:   7/8/2024  LDL  28  8/21/2023    Lab Results   Component Value Date     (H) 08/31/2021    LDL 48 (L) 10/02/2019     7/15/2025: LDL 64 mg/dL on 3/5/25                      Quality of Life Assessment   Quality of Life related to the patient's enrollment  in the patient management program and services provided was discussed with the patient. The QOL segment of this outreach has been reviewed and updated.  Quality of Life Improvement Scale: 7-Somewhat better    Reassessment Plan & Follow-Up  1. Medication Therapy Changes: Continue Repatha 140mg SUBQ every 14 days  2. Related Plans, Therapy Recommendations, or Issues to Be Addressed: None  3. Pharmacist to perform regular assessments no more than (6) months from the previous assessment.  4. Care Coordinator to set up future refill outreaches, coordinate prescription delivery, and escalate clinical questions to pharmacist.    Attestation  Therapeutic appropriateness: Appropriate   I attest the patient was actively involved in and has agreed to the above plan of care.  If the prescribed therapy is at any point deemed not appropriate based on the current or future assessments, a consultation will be initiated with the patient's specialty care provider to determine the best course of action. The revised plan of therapy will be documented along with any required assessments and/or additional patient education provided.     Milka Faulkner PharmD  Clinical Specialty Pharmacist, Cardiology  7/15/2025  11:10 EDT

## 2025-08-19 RX ORDER — CARVEDILOL 3.12 MG/1
3.12 TABLET ORAL EVERY 12 HOURS SCHEDULED
Qty: 180 TABLET | Refills: 11 | Status: SHIPPED | OUTPATIENT
Start: 2025-08-19 | End: 2026-08-14

## 2025-08-19 RX ORDER — AMLODIPINE BESYLATE 10 MG/1
10 TABLET ORAL DAILY
Qty: 90 TABLET | Refills: 11 | Status: SHIPPED | OUTPATIENT
Start: 2025-08-19

## 2025-08-26 ENCOUNTER — TELEPHONE (OUTPATIENT)
Dept: PULMONOLOGY | Facility: CLINIC | Age: 75
End: 2025-08-26
Payer: MEDICARE

## 2025-08-27 ENCOUNTER — OFFICE VISIT (OUTPATIENT)
Dept: PULMONOLOGY | Facility: CLINIC | Age: 75
End: 2025-08-27
Payer: MEDICARE

## (undated) DEVICE — SWIVEL CONNECTOR

## (undated) DEVICE — SOL IRRG H2O PL/BG 1000ML STRL

## (undated) DEVICE — CUP SPECI 4OZ LF STRL

## (undated) DEVICE — SOLIDIFIER LIQLOC PLS 1500CC BT

## (undated) DEVICE — GW FC FLOP/TP .035 260CM 3MM

## (undated) DEVICE — SINGLE USE ASPIRATION NEEDLE: Brand: SINGLE USE ASPIRATION NEEDLE

## (undated) DEVICE — SENSOR CONNECTION CLEANER

## (undated) DEVICE — SINGLE USE SUCTION VALVE MAJ-209: Brand: SINGLE USE SUCTION VALVE (STERILE)

## (undated) DEVICE — VISION PROBE ADAPTER AND SUCTION ADAPTER

## (undated) DEVICE — Device: Brand: ION

## (undated) DEVICE — SNAR E/S POLYP SNAREMASTER OVL/10MM 2.8X2300MM YEL

## (undated) DEVICE — CATH F6 ST JL 4 100CM: Brand: SUPERTORQUE

## (undated) DEVICE — Device: Brand: BALLOON

## (undated) DEVICE — REPROCESSING CONSUMABLES KIT

## (undated) DEVICE — VISION PROBE: Brand: ION

## (undated) DEVICE — STPCK 1WY SPINLOCK FML LL NONDEHP LF .20ML

## (undated) DEVICE — RADIFOCUS GLIDEWIRE: Brand: GLIDEWIRE

## (undated) DEVICE — TRAP,MUCUS SPECIMEN,40CC: Brand: MEDLINE

## (undated) DEVICE — ANGIO-SEAL VIP VASCULAR CLOSURE DEVICE: Brand: ANGIO-SEAL

## (undated) DEVICE — CATHETER GUIDE

## (undated) DEVICE — SINGLE USE BIOPSY VALVE MAJ-210: Brand: SINGLE USE BIOPSY VALVE (STERILE)

## (undated) DEVICE — THE SINGLE USE ETRAP – POLYP TRAP IS USED FOR SUCTION RETRIEVAL OF ENDOSCOPICALLY REMOVED POLYPS.: Brand: ETRAP

## (undated) DEVICE — REPROCESSING ACCESSORIES KIT

## (undated) DEVICE — GOWN ISOL OVR/HD TIE THMB/LP/CUF PE XLG BLU

## (undated) DEVICE — CATH F6 ST JR 4 100CM: Brand: SUPERTORQUE

## (undated) DEVICE — CATHETER: Brand: ION

## (undated) DEVICE — CATH F6 ST PIG 155 110CM 6SH: Brand: SUPER TORQUE

## (undated) DEVICE — LINER SURG CANSTR SXN S/RIGD 1500CC

## (undated) DEVICE — SI AVANTI+ 6F STD W/GW  NO OBT: Brand: AVANTI

## (undated) DEVICE — Device

## (undated) DEVICE — REPROCESSING COVER

## (undated) DEVICE — Device: Brand: DEFENDO AIR/WATER/SUCTION AND BIOPSY VALVE

## (undated) DEVICE — CATH F6INF TL 3DRC 100CM: Brand: INFINITI